# Patient Record
Sex: FEMALE | Race: WHITE | NOT HISPANIC OR LATINO | Employment: OTHER | URBAN - METROPOLITAN AREA
[De-identification: names, ages, dates, MRNs, and addresses within clinical notes are randomized per-mention and may not be internally consistent; named-entity substitution may affect disease eponyms.]

---

## 2017-01-12 ENCOUNTER — DOCUMENTATION ONLY (OUTPATIENT)
Dept: NEUROLOGY | Facility: CLINIC | Age: 57
End: 2017-01-12

## 2017-01-23 ENCOUNTER — TELEPHONE (OUTPATIENT)
Dept: NEUROLOGY | Facility: CLINIC | Age: 57
End: 2017-01-23

## 2017-01-23 NOTE — TELEPHONE ENCOUNTER
----- Message from Farrah Francis sent at 1/23/2017  3:07 PM CST -----  Contact: Patient  Patient called and said she needs a refill of teriflunomide (AUBAGIO) 14 mg Tab. She said her preferred pharmacy does not have the right medication and they are usually mailed to her via UPS. She did not know name of pharmacy.    Please call her about this at [phone]

## 2017-01-23 NOTE — TELEPHONE ENCOUNTER
I called this patient again there was no answer I left a message informing her that I had received both of her calls and was trying to call her back to assist her.      Shilo OTTO

## 2017-01-23 NOTE — TELEPHONE ENCOUNTER
----- Message from Mt Bright sent at 1/23/2017  3:42 PM CST -----  Contact: PT  Would like to speak with someone, regarding her medication teriflunomide (AUBAGIO) 14 mg Tab    Call: [phone]

## 2017-01-31 RX ORDER — TERIFLUNOMIDE 14 MG/1
14 TABLET, FILM COATED ORAL
Qty: 30 TABLET | Refills: 11 | Status: SHIPPED | OUTPATIENT
Start: 2017-01-31 | End: 2018-08-07

## 2017-04-12 ENCOUNTER — LAB VISIT (OUTPATIENT)
Dept: LAB | Facility: HOSPITAL | Age: 57
End: 2017-04-12
Attending: NEUROMUSCULOSKELETAL MEDICINE & OMM
Payer: COMMERCIAL

## 2017-04-12 ENCOUNTER — OFFICE VISIT (OUTPATIENT)
Dept: NEUROLOGY | Facility: CLINIC | Age: 57
End: 2017-04-12
Payer: COMMERCIAL

## 2017-04-12 VITALS
WEIGHT: 145 LBS | HEART RATE: 107 BPM | BODY MASS INDEX: 28.47 KG/M2 | DIASTOLIC BLOOD PRESSURE: 107 MMHG | HEIGHT: 60 IN | SYSTOLIC BLOOD PRESSURE: 138 MMHG

## 2017-04-12 DIAGNOSIS — G35 MS (MULTIPLE SCLEROSIS): Primary | ICD-10-CM

## 2017-04-12 DIAGNOSIS — R26.9 GAIT DISORDER: ICD-10-CM

## 2017-04-12 DIAGNOSIS — G35 MS (MULTIPLE SCLEROSIS): ICD-10-CM

## 2017-04-12 DIAGNOSIS — G35 MULTIPLE SCLEROSIS: ICD-10-CM

## 2017-04-12 LAB
ALBUMIN SERPL BCP-MCNC: 3.9 G/DL
ALP SERPL-CCNC: 106 U/L
ALT SERPL W/O P-5'-P-CCNC: 32 U/L
AST SERPL-CCNC: 40 U/L
BILIRUB DIRECT SERPL-MCNC: 0.1 MG/DL
BILIRUB SERPL-MCNC: 0.1 MG/DL
PROT SERPL-MCNC: 7.8 G/DL

## 2017-04-12 PROCEDURE — 99214 OFFICE O/P EST MOD 30 MIN: CPT | Mod: S$GLB,,, | Performed by: NEUROMUSCULOSKELETAL MEDICINE & OMM

## 2017-04-12 PROCEDURE — 99999 PR PBB SHADOW E&M-EST. PATIENT-LVL II: CPT | Mod: PBBFAC,,, | Performed by: NEUROMUSCULOSKELETAL MEDICINE & OMM

## 2017-04-12 PROCEDURE — 36415 COLL VENOUS BLD VENIPUNCTURE: CPT | Mod: PO

## 2017-04-12 PROCEDURE — 86704 HEP B CORE ANTIBODY TOTAL: CPT

## 2017-04-12 PROCEDURE — 3080F DIAST BP >= 90 MM HG: CPT | Mod: S$GLB,,, | Performed by: NEUROMUSCULOSKELETAL MEDICINE & OMM

## 2017-04-12 PROCEDURE — 3075F SYST BP GE 130 - 139MM HG: CPT | Mod: S$GLB,,, | Performed by: NEUROMUSCULOSKELETAL MEDICINE & OMM

## 2017-04-12 PROCEDURE — 80076 HEPATIC FUNCTION PANEL: CPT

## 2017-04-12 NOTE — PROGRESS NOTES
This note was dictated with Dragon Natural Speaking a word recognition program. There are occasionally word recognition errors which are missed on review.      Present Illness: Patient presents for follow-up for her MS.  She was seeing Dr. Zimmer but did not want to do the 3 MRI scans, so has come back to me.  She wants to change to a new medication, however I have encouraged her to stick with what she is on now-Aubagio since she is stable and not having any side effects.  The problem is she is not realistic and thinking that she can reverse her gait disorder.  I have emphasized the fact that I doubt if any other medications are going to reverse her present deficits.  I maintain goal at this point is to keep her from progressing.    Previous note 10-14-16: Patient presents for follow-up for her MS today with complaints of getting worse. She does not think the Aubagio is working as well as initially she thought. She feels her legs are getting weaker and she needs more support to walk. The last couple months she has not been able to walk without significant support of her walker. She spends most of the time in her scooter. She is interested in finding something new or and more potent to help her.     Previous note: 1-6-16: Patient presents for follow-up for her MS. She is actually doing much better on Aubagio. She is been taking the lower dose 7 mg for about 3 months and is ready to increase to the standard 14 mg dose. Her face pain has resolved. She can walk a few steps unassisted. She still stays active but uses her electric scooter most of the time outside the house. Her bladder has stabilized. MRI scans of brain and spinal cord have been stable with no significant change from the 2013 studies with a moderate to severe plaque load.            Neurologic exam:      Blepharospasm noted; normal speech with heavy Cajun accent  Cranial nerves: Normal visual acuity at bedside testing. Visual field to confrontation -  normal. Pupils are reactive. External ocular movements- full range of motion; no nystagmus or diplopia. Normal corneal reflexes and facial sensations. No facial asymmetry noted and facial movements including smiling were normal. Hearing was unimpaired. Palate movements are normal with normal gag response. Shoulder movements including shrug response was normal. Tongue movements are normal and with no evidence of atrophy ; involuntary movements - postural head tremor.  Muscle strength: upper- Weakness of the handgrip;  Lower-Right proximal leg-3 minus left 3 plus/5. Today she can still stand , but not walk unassisted with severe ataxia and weakness   Sensory examination:Pinprick and touch - variable decrease. Vibration- decreased < 10 seconds at the toes   Deep tendon reflexes: upper extremity 1- 2- +; lower extremity- KJ 2 + / AJ - 1+ Both plantars- flexor.   Cerebellar exam upper extremities - finger to nose - normal ; rapid alternating movements - gait- wide base unsteady - not able to walk unassisted., Tandem gait-poor. Romberg's -poor  Cervical exam: Cervical / trapezius muscle - no tenderness; Lumbar Exam: Low back tenderness-negative sciatic notch tenderness-negative straight leg raising test-negative    Diagnoses; Worsening gait ; multiple sclerosis with recent flareup; Multiple sclerosis with cognitive issues :Left third division trigeminal neuralgia;Left TMJ syndrome; fatigue; gait disorder ; negative ROB antibody repeat  Recommendations: Continue Aubagio for now; long discussion with the patient in terms of options.  Patient is interested in changing Ocluzimab, however we discussed the fact that she is stable on Aubagio and not having any side effects.  She also does not want to have any further MRIs done at the present time which I think would be necessary if we change medications.  She has agreed to continue with the Aubagio now.  Blood work including hep B antibody and liver function tests.  Follow-up 3  months

## 2017-04-13 LAB — HBV CORE AB SERPL QL IA: NEGATIVE

## 2017-06-07 ENCOUNTER — TELEPHONE (OUTPATIENT)
Dept: NEUROLOGY | Facility: CLINIC | Age: 57
End: 2017-06-07

## 2017-06-07 NOTE — TELEPHONE ENCOUNTER
----- Message from Ravi Chen sent at 6/5/2017  3:39 PM CDT -----  Contact: Self [phone]  Patient is requesting a return call regarding coming in to sign paperwork for new medication, pls call

## 2017-06-07 NOTE — TELEPHONE ENCOUNTER
I spoke to this patient and she states that she has fallen 4 times on yesterday and she will be in on Friday to fill out the forms to start Ocrevus

## 2017-06-14 ENCOUNTER — TELEPHONE (OUTPATIENT)
Dept: ALLERGY | Facility: CLINIC | Age: 57
End: 2017-06-14

## 2017-06-16 ENCOUNTER — TELEPHONE (OUTPATIENT)
Dept: NEUROLOGY | Facility: CLINIC | Age: 57
End: 2017-06-16

## 2017-06-16 NOTE — TELEPHONE ENCOUNTER
----- Message from Ravi Chen sent at 6/16/2017  1:47 PM CDT -----  Contact: Self [phone]  Patient is returning a missed call from Shilo about signing paper she can't make it today she'll go next Friday.      That is fine she can come when ever she has the opportunity

## 2017-08-04 ENCOUNTER — OFFICE VISIT (OUTPATIENT)
Dept: NEUROLOGY | Facility: CLINIC | Age: 57
End: 2017-08-04
Payer: COMMERCIAL

## 2017-08-04 VITALS — HEART RATE: 97 BPM | SYSTOLIC BLOOD PRESSURE: 130 MMHG | HEIGHT: 60 IN | DIASTOLIC BLOOD PRESSURE: 93 MMHG

## 2017-08-04 DIAGNOSIS — R26.9 GAIT DISORDER: ICD-10-CM

## 2017-08-04 DIAGNOSIS — G35 MS (MULTIPLE SCLEROSIS): Primary | ICD-10-CM

## 2017-08-04 DIAGNOSIS — G50.0 TRIGEMINAL NEURALGIA: ICD-10-CM

## 2017-08-04 PROCEDURE — 99215 OFFICE O/P EST HI 40 MIN: CPT | Mod: S$GLB,,, | Performed by: NEUROMUSCULOSKELETAL MEDICINE & OMM

## 2017-08-04 PROCEDURE — 3008F BODY MASS INDEX DOCD: CPT | Mod: S$GLB,,, | Performed by: NEUROMUSCULOSKELETAL MEDICINE & OMM

## 2017-08-04 PROCEDURE — 99999 PR PBB SHADOW E&M-EST. PATIENT-LVL III: CPT | Mod: PBBFAC,,, | Performed by: NEUROMUSCULOSKELETAL MEDICINE & OMM

## 2017-08-04 RX ORDER — OXCARBAZEPINE 300 MG/1
300 TABLET, FILM COATED ORAL 2 TIMES DAILY
Qty: 60 TABLET | Refills: 5 | Status: SHIPPED | OUTPATIENT
Start: 2017-08-04 | End: 2018-07-23 | Stop reason: SDUPTHER

## 2017-08-04 RX ORDER — ESCITALOPRAM OXALATE 20 MG/1
20 TABLET ORAL NIGHTLY
Qty: 30 TABLET | Refills: 3 | Status: SHIPPED | OUTPATIENT
Start: 2017-08-04 | End: 2017-12-21 | Stop reason: SDUPTHER

## 2017-08-04 RX ORDER — LORAZEPAM 2 MG/1
2 TABLET ORAL 3 TIMES DAILY PRN
Qty: 90 TABLET | Refills: 0 | Status: SHIPPED | OUTPATIENT
Start: 2017-08-04

## 2017-08-04 NOTE — PROGRESS NOTES
Office Visit     4/12/2017  [place] - Neurology      Josh Moon MD   Neurology   MS (multiple sclerosis) +2 more   Dx    Additional Documentation     Vitals:    BP  138/107 (BP Location: Left arm, Patient Position: Sitting, BP Method: Automatic)    Pulse 107    Ht 5' (1.524 m)    Wt 65.8 kg (145 lb)    BMI 28.32 kg/m²    BSA 1.67 m²    Flowsheets:    Custom Formula Data,    Anthropometrics       Encounter Info:    Billing Info,    Detailed Report,    Patient Education,    Care Plan,    History,    Allergies,    Patient-Entered Questionnaires       Instructions         Return in about 3 months (around 7/12/2017).   Progress Notes        This note was dictated with Dragon Natural Speaking a word recognition program. There are occasionally word recognition errors which are missed on review.      Present Illness:  patient presents for follow-up for her MS.  She has been getting weaker with more difficulty walking.  Her  says that she can go for a week without being able to get out of the bed or walk.  Today she is doing better and was able to get out of the bed and go to the bathroom with her walker.  The weakness seems to be off and on.  Her other issue was left facial shocking pain.  I was called by the dentist who wanted to prescribe something for the pain.  We started her on Trileptal 150 mg twice a day.  She says this is not helping the pain nor did the tooth that she had pulled stop the pain.  She is presently on the Trileptal 150 twice a day and we will increase that to 300 twice a day over a several day.  She is still taking Aubagio but apparently is is not helping.  We do not know if she has new lesions since she did not have the MRIs done.  Previous note: 4-12-17 Patient presents for follow-up for her MS.  She was seeing Dr. Zimmer but did not want to do the 3 MRI scans, so has come back to me.  She wants to change to a new medication, however I have encouraged her to stick with what she is  on now-Aubagio since she is stable and not having any side effects.  The problem is she is not realistic and thinking that she can reverse her gait disorder.  I have emphasized the fact that I doubt if any other medications are going to reverse her present deficits.  I maintain goal at this point is to keep her from progressing.     Previous note 10-14-16: Patient presents for follow-up for her MS today with complaints of getting worse. She does not think the Aubagio is working as well as initially she thought. She feels her legs are getting weaker and she needs more support to walk. The last couple months she has not been able to walk without significant support of her walker. She spends most of the time in her scooter. She is interested in finding something new or and more potent to help her.     Previous note: 1-6-16: Patient presents for follow-up for her MS. She is actually doing much better on Aubagio. She is been taking the lower dose 7 mg for about 3 months and is ready to increase to the standard 14 mg dose. Her face pain has resolved. She can walk a few steps unassisted. She still stays active but uses her electric scooter most of the time outside the house. Her bladder has stabilized. MRI scans of brain and spinal cord have been stable with no significant change from the 2013 studies with a moderate to severe plaque load.             Neurologic exam:      Blepharospasm noted; normal speech with heavy Cajun accent  Cranial nerves: Normal visual acuity at bedside testing. Visual field to confrontation - normal. Pupils are reactive. External ocular movements- full range of motion; no nystagmus or diplopia. Normal corneal reflexes and facial sensations. No facial asymmetry noted and facial movements including smiling were normal. Hearing was unimpaired. Palate movements are normal with normal gag response. Shoulder movements including shrug response was normal. Tongue movements are normal and with no evidence  of atrophy ; involuntary movements - postural head tremor.  Muscle strength: upper- Weakness of the handgrip;  Lower-Right proximal leg-3 minus left 3 plus/5. Today she can still stand , but not walk unassisted with severe ataxia and weakness   Sensory examination:Pinprick and touch - variable decrease. Vibration- decreased < 10 seconds at the toes   Deep tendon reflexes: upper extremity 1- 2- +; lower extremity- KJ 2 + / AJ - 1+ Both plantars- flexor.   Cerebellar exam upper extremities - finger to nose - normal ; rapid alternating movements - gait- wide base unsteady - not able to walk unassisted., Tandem gait-poor. Romberg's -poor  Cervical exam: Cervical / trapezius muscle - no tenderness; Lumbar Exam: Low back tenderness-negative sciatic notch tenderness-negative straight leg raising test-negative    Diagnoses; Worsening gait ; multiple sclerosis with recent flareup; Multiple sclerosis with cognitive issues :Left third division trigeminal neuralgia;Left TMJ syndrome; fatigue; gait disorder ; negative ROB antibody repeat  Recommendations:  MRI brain and cervical spine with and without contrast.  Patient has not had repeat MRIs since she has been on the Aubagio.  We'll start Ocrelizumab since she has been going downhill over the last several months to the point of inability to walk.  Follow-up 2 months

## 2017-08-17 ENCOUNTER — TELEPHONE (OUTPATIENT)
Dept: PHYSICAL MEDICINE AND REHAB | Facility: CLINIC | Age: 57
End: 2017-08-17

## 2017-08-17 NOTE — TELEPHONE ENCOUNTER
----- Message from Zane Child sent at 8/17/2017  1:25 PM CDT -----  Contact: Jenn jean/ [facility] @ [phone]  Jenn is calling to speak with someone regarding the pt. Pls call.      I have called and reached the voicemail therefore I left a message with my direct extension and asked that she call ne tomorrow because I am not in the office today

## 2017-08-21 ENCOUNTER — TELEPHONE (OUTPATIENT)
Dept: NEUROLOGY | Facility: CLINIC | Age: 57
End: 2017-08-21

## 2017-08-21 NOTE — TELEPHONE ENCOUNTER
Left detailed message on Jenn's voicemail explaining Dr. Vinson as well as Shilo is out of the office until Monday August 28, 2017/Asked that she call back on Monday.

## 2017-08-21 NOTE — TELEPHONE ENCOUNTER
----- Message from Ravi Chen sent at 8/21/2017 11:00 AM CDT -----  Contact: Jenn with [facility] [phone]   Caller is requesting a return phone call from the nurse regarding a fax sent regarding the recent MRI, neuro exam, history and physical, pls call or fax [phone]

## 2017-09-27 ENCOUNTER — TELEPHONE (OUTPATIENT)
Dept: NEUROLOGY | Facility: CLINIC | Age: 57
End: 2017-09-27

## 2017-09-27 NOTE — TELEPHONE ENCOUNTER
----- Message from Yen Escobar sent at 9/26/2017  2:17 PM CDT -----  Contact: Patient [phone]  Patient is calling to find out if her insurance will cover her injections for her MS. Please call         I called this patient three times yesterday and there was no answer then I called again and she answered there was a kid yelling and crying in the background then she kept putting me on hold so I told her I will let her deal with that child and call her back and there was no answer and I have tried twice again this morning and there was no answer

## 2017-12-26 RX ORDER — ESCITALOPRAM OXALATE 20 MG/1
20 TABLET ORAL NIGHTLY
Qty: 30 TABLET | Refills: 2 | Status: SHIPPED | OUTPATIENT
Start: 2017-12-26 | End: 2018-08-14 | Stop reason: SDUPTHER

## 2018-02-21 ENCOUNTER — TELEPHONE (OUTPATIENT)
Dept: NEUROLOGY | Facility: CLINIC | Age: 58
End: 2018-02-21

## 2018-02-21 NOTE — TELEPHONE ENCOUNTER
Pt aware that 2/23/18 appt would need to be cancelled due to provider being out. She has accepted a new appt on 3/2/18 @ 2:20pm. New appt letter placed in the mail.

## 2018-03-12 ENCOUNTER — OFFICE VISIT (OUTPATIENT)
Dept: NEUROLOGY | Facility: CLINIC | Age: 58
End: 2018-03-12
Payer: COMMERCIAL

## 2018-03-12 VITALS
HEIGHT: 60 IN | BODY MASS INDEX: 28.47 KG/M2 | DIASTOLIC BLOOD PRESSURE: 104 MMHG | WEIGHT: 145 LBS | SYSTOLIC BLOOD PRESSURE: 147 MMHG | HEART RATE: 109 BPM

## 2018-03-12 DIAGNOSIS — R26.9 GAIT DISORDER: Primary | ICD-10-CM

## 2018-03-12 DIAGNOSIS — G35 MULTIPLE SCLEROSIS EXACERBATION: ICD-10-CM

## 2018-03-12 DIAGNOSIS — G82.20 PARAPLEGIA: ICD-10-CM

## 2018-03-12 PROCEDURE — 99214 OFFICE O/P EST MOD 30 MIN: CPT | Mod: S$GLB,,, | Performed by: NEUROMUSCULOSKELETAL MEDICINE & OMM

## 2018-03-12 PROCEDURE — 3077F SYST BP >= 140 MM HG: CPT | Mod: CPTII,S$GLB,, | Performed by: NEUROMUSCULOSKELETAL MEDICINE & OMM

## 2018-03-12 PROCEDURE — 3080F DIAST BP >= 90 MM HG: CPT | Mod: CPTII,S$GLB,, | Performed by: NEUROMUSCULOSKELETAL MEDICINE & OMM

## 2018-03-12 PROCEDURE — 99999 PR PBB SHADOW E&M-EST. PATIENT-LVL III: CPT | Mod: PBBFAC,,, | Performed by: NEUROMUSCULOSKELETAL MEDICINE & OMM

## 2018-03-12 RX ORDER — ESCITALOPRAM OXALATE 10 MG/1
10 TABLET ORAL DAILY
Refills: 11 | Status: ON HOLD | COMMUNITY
Start: 2018-03-07 | End: 2018-09-05 | Stop reason: HOSPADM

## 2018-03-13 ENCOUNTER — TELEPHONE (OUTPATIENT)
Dept: NEUROLOGY | Facility: CLINIC | Age: 58
End: 2018-03-13

## 2018-03-13 PROBLEM — G82.20 PARAPLEGIA: Status: ACTIVE | Noted: 2018-03-13

## 2018-03-13 NOTE — TELEPHONE ENCOUNTER
----- Message from Ravi Chen sent at 3/13/2018  2:10 PM CDT -----  Contact: Victorina jean [facility] @ [phone]  Caller is calling to get the diagnosis on the fax received, pls call or re fax with the diagnosis @ [phone] pt is at the location now

## 2018-03-13 NOTE — PROGRESS NOTES
This note was dictated with Dragon Natural Speaking a word recognition program. There are occasionally word recognition errors which are missed on review.      Present Illness: Patient presents for follow-up with her  today.  She has had a severe exacerbation over the last several days to the point that she cannot move her legs at all.  She previously could get up with assistance.  She feels like she is having a severe exacerbation.  She apparently has had problems with insurance and getting on the Ocrevus which apparently was approved.  Unfortunately the  did not follow-up with the insurance people and she has been without medication for 6 months.  We will start over and try to get this set up.  Previous note: 8-04-17  patient presents for follow-up for her MS.  She has been getting weaker with more difficulty walking.  Her  says that she can go for a week without being able to get out of the bed or walk.  Today she is doing better and was able to get out of the bed and go to the bathroom with her walker.  The weakness seems to be off and on.  Her other issue was left facial shocking pain.  I was called by the dentist who wanted to prescribe something for the pain.  We started her on Trileptal 150 mg twice a day.  She says this is not helping the pain nor did the tooth that she had pulled stop the pain.  She is presently on the Trileptal 150 twice a day and we will increase that to 300 twice a day over a several day.  She is still taking Aubagio but apparently is is not helping.  We do not know if she has new lesions since she did not have the MRIs done.  Previous note: 4-12-17 Patient presents for follow-up for her MS.  She was seeing Dr. Zimmer but did not want to do the 3 MRI scans, so has come back to me.  She wants to change to a new medication, however I have encouraged her to stick with what she is on now-Aubagio since she is stable and not having any side effects.  The problem is she is  not realistic and thinking that she can reverse her gait disorder.  I have emphasized the fact that I doubt if any other medications are going to reverse her present deficits.  I maintain goal at this point is to keep her from progressing.     Previous note 10-14-16: Patient presents for follow-up for her MS today with complaints of getting worse. She does not think the Aubagio is working as well as initially she thought. She feels her legs are getting weaker and she needs more support to walk. The last couple months she has not been able to walk without significant support of her walker. She spends most of the time in her scooter. She is interested in finding something new or and more potent to help her.     Previous note: 1-6-16: Patient presents for follow-up for her MS. She is actually doing much better on Aubagio. She is been taking the lower dose 7 mg for about 3 months and is ready to increase to the standard 14 mg dose. Her face pain has resolved. She can walk a few steps unassisted. She still stays active but uses her electric scooter most of the time outside the house. Her bladder has stabilized. MRI scans of brain and spinal cord have been stable with no significant change from the 2013 studies with a moderate to severe plaque load.             Neurologic exam:      Blepharospasm noted; normal speech with heavy Cajun accent  Cranial nerves: Normal visual acuity at bedside testing. Visual field to confrontation - normal. Pupils are reactive. External ocular movements- full range of motion; no nystagmus or diplopia. Normal corneal reflexes and facial sensations. No facial asymmetry noted and facial movements including smiling were normal. Hearing was unimpaired. Palate movements are normal with normal gag response. Shoulder movements including shrug response was normal. Tongue movements are normal and with no evidence of atrophy ; involuntary movements - postural head tremor.  Muscle strength: upper-  Weakness of the handgrip;  Patient has severe lower extremity weakness and is unable to stand today.  Sensory examination:Pinprick and touch - variable decrease. Vibration- decreased < 10 seconds at the toes   Deep tendon reflexes: upper extremity 1- 2- +; lower extremity- KJ 2 + / AJ - 1+ Both plantars- flexor.   Cerebellar exam upper extremities - finger to nose - normal ; rapid alternating movements - gait- wide base unsteady - not able to walk unassisted., Tandem gait-poor. Romberg's -poor  Cervical exam: Cervical / trapezius muscle - no tenderness; Lumbar Exam: Low back tenderness-negative sciatic notch tenderness-negative straight leg raising test-negative    Diagnoses; acute MS exacerbation; Worsening gait ; multiple sclerosis with recent flareup; Multiple sclerosis with cognitive issues :Left third division trigeminal neuralgia;Left TMJ syndrome; fatigue; gait disorder ; negative ROB antibody repeat  Recommendations:   patient will be given 3 days of IV Solu-Medrol for an acute exacerbation.  The  will check on the insurance situation in terms of Ocrevus to get her started on this medicine.

## 2018-04-13 ENCOUNTER — TELEPHONE (OUTPATIENT)
Dept: NEUROLOGY | Facility: CLINIC | Age: 58
End: 2018-04-13

## 2018-04-13 ENCOUNTER — PATIENT MESSAGE (OUTPATIENT)
Dept: NEUROLOGY | Facility: CLINIC | Age: 58
End: 2018-04-13

## 2018-04-13 NOTE — TELEPHONE ENCOUNTER
----- Message from Marleny Toscano sent at 4/12/2018  1:30 PM CDT -----  Contact: Mary from [employer]  Mary is calling for information on iv drug for pt and can be reached at [phone].    Thank you

## 2018-04-19 ENCOUNTER — PATIENT MESSAGE (OUTPATIENT)
Dept: NEUROLOGY | Facility: CLINIC | Age: 58
End: 2018-04-19

## 2018-04-27 ENCOUNTER — TELEPHONE (OUTPATIENT)
Dept: NEUROLOGY | Facility: CLINIC | Age: 58
End: 2018-04-27

## 2018-05-22 ENCOUNTER — PATIENT MESSAGE (OUTPATIENT)
Dept: NEUROLOGY | Facility: CLINIC | Age: 58
End: 2018-05-22

## 2018-05-23 ENCOUNTER — TELEPHONE (OUTPATIENT)
Dept: NEUROLOGY | Facility: CLINIC | Age: 58
End: 2018-05-23

## 2018-05-23 NOTE — TELEPHONE ENCOUNTER
Dr. Moon can you create an order for Ocrevus infusion and sign so I can send to pharmacy and get this started for patient.

## 2018-06-20 ENCOUNTER — PATIENT MESSAGE (OUTPATIENT)
Dept: NEUROLOGY | Facility: CLINIC | Age: 58
End: 2018-06-20

## 2018-06-21 ENCOUNTER — PATIENT MESSAGE (OUTPATIENT)
Dept: NEUROLOGY | Facility: CLINIC | Age: 58
End: 2018-06-21

## 2018-06-22 ENCOUNTER — TELEPHONE (OUTPATIENT)
Dept: NEUROLOGY | Facility: CLINIC | Age: 58
End: 2018-06-22

## 2018-07-05 ENCOUNTER — PATIENT MESSAGE (OUTPATIENT)
Dept: NEUROLOGY | Facility: CLINIC | Age: 58
End: 2018-07-05

## 2018-07-06 ENCOUNTER — TELEPHONE (OUTPATIENT)
Dept: NEUROLOGY | Facility: CLINIC | Age: 58
End: 2018-07-06

## 2018-07-06 NOTE — TELEPHONE ENCOUNTER
----- Message from Janel العلي sent at 7/6/2018  1:03 PM CDT -----  Rx Refill/Request     Is this a Refill or New Rx:  refill  Rx Name and Strength:  dalfampridine (AMPYRA) 10 mg Tb12  Preferred Pharmacy with phone number:     [facility] [place] [address]  [address] [address]  Phone: [phone] Fax: [phone]      Communication Preference:[facility] @ [phone] option 3  Additional Information:

## 2018-07-06 NOTE — TELEPHONE ENCOUNTER
Spoke with patients  and he states they have been awaiting approval for Ocrevus. Advised patient that I would speak with Sarai and initiate any PA or paperwork that needs to be done.

## 2018-07-06 NOTE — TELEPHONE ENCOUNTER
Can you put in lab orders for the Ocrevus. We need you to order TB, HIV, Basebline CBC, Hep. A,B,C. And whatever other labs that are not up to date for MS. Can you put in this order ASAP. Going to schedule an appointment with Dr. Zimmer so we can get her started on the Ocrevus infusion. Spoke with patients  and will begin working on everything once the labs are ordered.

## 2018-07-11 RX ORDER — ROSUVASTATIN CALCIUM 10 MG/1
10 TABLET, COATED ORAL NIGHTLY
Refills: 11 | COMMUNITY
Start: 2018-07-03

## 2018-07-13 ENCOUNTER — TELEPHONE (OUTPATIENT)
Dept: NEUROLOGY | Facility: CLINIC | Age: 58
End: 2018-07-13

## 2018-07-13 DIAGNOSIS — G35 MS (MULTIPLE SCLEROSIS): Primary | ICD-10-CM

## 2018-07-18 ENCOUNTER — LAB VISIT (OUTPATIENT)
Dept: LAB | Facility: HOSPITAL | Age: 58
End: 2018-07-18
Attending: PSYCHIATRY & NEUROLOGY
Payer: COMMERCIAL

## 2018-07-18 ENCOUNTER — TELEPHONE (OUTPATIENT)
Dept: NEUROLOGY | Facility: CLINIC | Age: 58
End: 2018-07-18

## 2018-07-18 ENCOUNTER — OFFICE VISIT (OUTPATIENT)
Dept: NEUROLOGY | Facility: CLINIC | Age: 58
End: 2018-07-18
Payer: COMMERCIAL

## 2018-07-18 VITALS
BODY MASS INDEX: 30.81 KG/M2 | HEIGHT: 60 IN | SYSTOLIC BLOOD PRESSURE: 144 MMHG | DIASTOLIC BLOOD PRESSURE: 104 MMHG | WEIGHT: 156.94 LBS

## 2018-07-18 DIAGNOSIS — G35 MS (MULTIPLE SCLEROSIS): ICD-10-CM

## 2018-07-18 DIAGNOSIS — D84.9 IMMUNOSUPPRESSION: ICD-10-CM

## 2018-07-18 DIAGNOSIS — M62.838 MUSCLE SPASM: ICD-10-CM

## 2018-07-18 DIAGNOSIS — Z74.09 IMMOBILITY: ICD-10-CM

## 2018-07-18 DIAGNOSIS — N31.9 NEUROGENIC BLADDER: Primary | ICD-10-CM

## 2018-07-18 DIAGNOSIS — F34.1 DYSTHYMIA: ICD-10-CM

## 2018-07-18 DIAGNOSIS — Z71.89 COUNSELING REGARDING GOALS OF CARE: ICD-10-CM

## 2018-07-18 DIAGNOSIS — Z29.89 PROPHYLACTIC IMMUNOTHERAPY: ICD-10-CM

## 2018-07-18 DIAGNOSIS — R26.9 GAIT DISTURBANCE: ICD-10-CM

## 2018-07-18 DIAGNOSIS — Z79.899 ENCOUNTER FOR LONG-TERM (CURRENT) USE OF HIGH-RISK MEDICATION: ICD-10-CM

## 2018-07-18 PROCEDURE — 99354 PR PROLONGED SVC, OUPT, 1ST HR: CPT | Mod: S$GLB,,, | Performed by: PSYCHIATRY & NEUROLOGY

## 2018-07-18 PROCEDURE — 3008F BODY MASS INDEX DOCD: CPT | Mod: CPTII,S$GLB,, | Performed by: PSYCHIATRY & NEUROLOGY

## 2018-07-18 PROCEDURE — 99215 OFFICE O/P EST HI 40 MIN: CPT | Mod: S$GLB,,, | Performed by: PSYCHIATRY & NEUROLOGY

## 2018-07-18 PROCEDURE — 99999 PR PBB SHADOW E&M-EST. PATIENT-LVL III: CPT | Mod: PBBFAC,,, | Performed by: PSYCHIATRY & NEUROLOGY

## 2018-07-18 PROCEDURE — 86480 TB TEST CELL IMMUN MEASURE: CPT

## 2018-07-18 RX ORDER — ARIPIPRAZOLE 5 MG/1
5 TABLET ORAL DAILY
Qty: 30 TABLET | Refills: 11 | Status: SHIPPED | OUTPATIENT
Start: 2018-07-18 | End: 2020-03-25

## 2018-07-18 NOTE — PROGRESS NOTES
Subjective:       Patient ID: Deb Figueroa is a 57 y.o. female who presents today for a routine clinic visit for MS.  Pt last seen Nov 2016 by me; Pt is accompanied by her     MS HPI:  · DMT: none; stopped Aubagio one year ago;   · Taking vitamin D3 as recommended? no  · Pt states that in the past 1.5 years since I saw her.  She states that she has lost the ability to walk with a walker;  Now her transfers are more difficult, and often falls;  She is unable to stand and pivot;    · His  states there are lots of caregiving needs which are provided by her  and other extended family members  · On Trileptal for [place];      SOCIAL HISTORY  Social History   Substance Use Topics    Smoking status: Never Smoker    Smokeless tobacco: Never Used    Alcohol use No     Living arrangements - the patient lives with their family.  Employment : no    MS ROS:  · Bladder Dysfunction: Yes - some incontinence; wears diaper; interested in meds that might help; gets UTIs only occasionally; never admitted with UTI she states;          Objective:      In scooter; unable to walk or stand  Neurologic Exam    right EVAN, mild dysarthria    RHF 1/5, RKF 3/5, RDF 4/5  LHF 2/5, LKF 3/5, LDF 4/5         Imaging:     Results for orders placed during the hospital encounter of 10/16/15   MRI Brain W WO Contrast    Impression Findings in the cerebral white matter which are typical for multiple sclerosis with moderate to severe degree of underlying plaque burden.  Compared to the prior examination of 8/19/2013, the overall number and signal intensity of the white matter lesions is stable.  No new enhancing plaques are seen to suggest active disease.    Left maxillary sinus disease.  ______________________________________     Electronically signed by resident: Josh Canales  Date:     10/16/15  Time:    16:12          As the supervising and teaching physician, I personally reviewed the images and resident's interpretation and I  agree with the findings.          Electronically signed by: Aisha Stevens MD  Date:     10/16/15  Time:    16:16      Results for orders placed during the hospital encounter of 10/16/15   MRI Cervical Spine W WO Cont    Impression      Focal signal abnormalities again seen within the craniocervical junction and cervical spinal cord likely representing plaques of prior demyelination given patient's history of multiple sclerosis.  No evidence for enhancement to suggest active demyelination.  Overall findings are stable when compared to prior examination from 8/19/2013.  ______________________________________     Electronically signed by resident: Josh Canales  Date:     10/16/15  Time:    16:11          As the supervising and teaching physician, I personally reviewed the images and resident's interpretation and I agree with the findings.          Electronically signed by: Aisha Stevens MD  Date:     10/16/15  Time:    16:15                  Labs:     Lab Results   Component Value Date    HZHBXVQP23KJ 7 (L) 07/18/2018    MUSSAVNU23MK 15 (L) 11/28/2016     Lab Results   Component Value Date    JCVINDEX 0.10 11/28/2016    JCVANTIBODY Negative 11/28/2016     Lab Results   Component Value Date    TQ3LWRJE 81.7 11/28/2016    ABSOLUTECD3 2375 (H) 11/28/2016    TY9VJXJD 23.7 11/28/2016    ABSOLUTECD8 689 11/28/2016    LA2MUABL 58.7 (H) 11/28/2016    ABSOLUTECD4 1707 (H) 11/28/2016    LABCD48 2.48 11/28/2016     Lab Results   Component Value Date    WBC 7.96 07/18/2018    RBC 5.71 (H) 07/18/2018    HGB 15.3 07/18/2018    HCT 46.7 07/18/2018    MCV 82 07/18/2018    MCH 26.8 (L) 07/18/2018    MCHC 32.8 07/18/2018    RDW 13.2 07/18/2018     07/18/2018    MPV 10.9 07/18/2018    GRAN 4.1 07/18/2018    GRAN 52.0 07/18/2018    LYMPH 3.1 07/18/2018    LYMPH 39.2 07/18/2018    MONO 0.5 07/18/2018    MONO 6.4 07/18/2018    EOS 0.1 07/18/2018    BASO 0.08 07/18/2018    EOSINOPHIL 1.1 07/18/2018    BASOPHIL 1.0 07/18/2018      Sodium   Date Value Ref Range Status   07/18/2018 140 136 - 145 mmol/L Final     Potassium   Date Value Ref Range Status   07/18/2018 3.6 3.5 - 5.1 mmol/L Final     Chloride   Date Value Ref Range Status   07/18/2018 103 95 - 110 mmol/L Final     CO2   Date Value Ref Range Status   07/18/2018 26 23 - 29 mmol/L Final     Glucose   Date Value Ref Range Status   07/18/2018 109 70 - 110 mg/dL Final     BUN, Bld   Date Value Ref Range Status   07/18/2018 15 6 - 20 mg/dL Final     Creatinine   Date Value Ref Range Status   07/18/2018 0.8 0.5 - 1.4 mg/dL Final     Calcium   Date Value Ref Range Status   07/18/2018 10.2 8.7 - 10.5 mg/dL Final     Total Protein   Date Value Ref Range Status   07/18/2018 8.8 (H) 6.0 - 8.4 g/dL Final     Albumin   Date Value Ref Range Status   07/18/2018 4.6 3.5 - 5.2 g/dL Final     Total Bilirubin   Date Value Ref Range Status   07/18/2018 0.3 0.1 - 1.0 mg/dL Final     Comment:     For infants and newborns, interpretation of results should be based  on gestational age, weight and in agreement with clinical  observations.  Premature Infant recommended reference ranges:  Up to 24 hours.............<8.0 mg/dL  Up to 48 hours............<12.0 mg/dL  3-5 days..................<15.0 mg/dL  6-29 days.................<15.0 mg/dL       Alkaline Phosphatase   Date Value Ref Range Status   07/18/2018 130 55 - 135 U/L Final     AST   Date Value Ref Range Status   07/18/2018 32 10 - 40 U/L Final     ALT   Date Value Ref Range Status   07/18/2018 22 10 - 44 U/L Final     Anion Gap   Date Value Ref Range Status   07/18/2018 11 8 - 16 mmol/L Final     eGFR if    Date Value Ref Range Status   07/18/2018 >60.0 >60 mL/min/1.73 m^2 Final     eGFR if non    Date Value Ref Range Status   07/18/2018 >60.0 >60 mL/min/1.73 m^2 Final     Comment:     Calculation used to obtain the estimated glomerular filtration  rate (eGFR) is the CKD-EPI equation.                     Diagnosis/Assessment/Plan:    1. Multiple Sclerosis  · Assessment: Pt continues to decline in step-wise fashion; suggest initiation of Ocrevus  · Imaging: New MRis of brain, C and T spine ordered today  · Disease Modifying Therapies: will initiate Ocrevus; The rationale, side effects, risks and expectations of this medication was discussed. Refer to ID for vaccinations in consideration of chronically immunosuppressed state; Refer to ID for vaccinations in consideration of chronically immunosuppressed state;       2. MS Symptom Assessment / Management  · Mood Disorder: will add Abilify to Lexapro as adjunctive treatment of depression; The rationale, side effects, risks and expectations of this medication was discussed.     · Adaptive needs: will order Gabriel lift ; also discussed idea of hiring help in AM and PM at home--1 hour each time as caretaking is becoming a problem;   · Bladder: will start mirabegron; side effects discussed    F/u Carlota Duran PA-C in 10 weeks    Over 50% of this 90 minute visit was spent in direct face to face counseling of the patient about MS, DMT considerations, and MS symptom management.         Neurogenic bladder  -     mirabegron (MYRBETRIQ) 25 mg Tb24 ER tablet; Take 1 tablet (25 mg total) by mouth once daily.  Dispense: 30 tablet; Refill: 11    MS (multiple sclerosis)  -     PATIENT (GABRIEL) LIFT FOR HOME USE  -     ARIPiprazole (ABILIFY) 5 MG Tab; Take 1 tablet (5 mg total) by mouth once daily.  Dispense: 30 tablet; Refill: 11  -     MRI Brain Demyelinating W W/O Contrast; Future; Expected date: 07/18/2018  -     MRI Cervical Spine Demyelinating W W/O Contrast; Future; Expected date: 07/18/2018  -     MRI Thoracic Spine Demyelinating W W/O Contrast; Future; Expected date: 07/18/2018  -     Hepatitis B core antibody, total; Future; Expected date: 07/18/2018  -     Hepatitis B surface antibody; Future; Expected date: 07/18/2018  -     Hepatitis B surface antigen;  Future; Expected date: 07/18/2018  -     Hepatitis A antibody, IgG; Future; Expected date: 07/18/2018  -     Hepatitis C antibody; Future; Expected date: 07/18/2018  -     QUANTIFERON GOLD TB; Future; Expected date: 07/18/2018  -     Comprehensive metabolic panel; Future  -     CBC auto differential; Future; Expected date: 07/18/2018  -     Vitamin D; Future  -     STRATIFY JCV ANTIBODY (with Index); Future; Expected date: 07/18/2018  -     Vitamin B12; Future; Expected date: 07/18/2018  -     HIV-1 and HIV-2 antibodies; Future; Expected date: 07/18/2018  -     RPR; Future; Expected date: 07/18/2018  -     Varicella zoster antibody, IgG; Future; Expected date: 07/18/2018  -     STRONGYLOIDES IGG ANTIBODIES; Future; Expected date: 07/18/2018  -     Ambulatory Referral to Infectious Disease    Immobility  -     PATIENT (REY) LIFT FOR HOME USE    Dysthymia  -     ARIPiprazole (ABILIFY) 5 MG Tab; Take 1 tablet (5 mg total) by mouth once daily.  Dispense: 30 tablet; Refill: 11    Immunosuppression  -     Hepatitis B core antibody, total; Future; Expected date: 07/18/2018  -     Hepatitis B surface antibody; Future; Expected date: 07/18/2018  -     Hepatitis B surface antigen; Future; Expected date: 07/18/2018  -     Hepatitis A antibody, IgG; Future; Expected date: 07/18/2018  -     Hepatitis C antibody; Future; Expected date: 07/18/2018  -     QUANTIFERON GOLD TB; Future; Expected date: 07/18/2018  -     Comprehensive metabolic panel; Future  -     CBC auto differential; Future; Expected date: 07/18/2018  -     Vitamin D; Future  -     STRATIFY JCV ANTIBODY (with Index); Future; Expected date: 07/18/2018  -     Vitamin B12; Future; Expected date: 07/18/2018  -     HIV-1 and HIV-2 antibodies; Future; Expected date: 07/18/2018  -     RPR; Future; Expected date: 07/18/2018  -     Varicella zoster antibody, IgG; Future; Expected date: 07/18/2018  -     STRONGYLOIDES IGG ANTIBODIES; Future; Expected date: 07/18/2018  -      Ambulatory Referral to Infectious Disease

## 2018-07-18 NOTE — TELEPHONE ENCOUNTER
Left VM informing patient of MRI and ID appointment.  Also, need to schedule 10wk follow up appointment with Carlota Duran

## 2018-07-20 LAB
MITOGEN IGNF BCKGRD COR BLD-ACNC: 0.06 IU/ML
MITOGEN NIL: >10 IU/ML
TB GOLD PLUS: NEGATIVE
TB1 - NIL: 0.01 IU/ML
TB2 - NIL: 0.01 IU/ML

## 2018-07-23 ENCOUNTER — TELEPHONE (OUTPATIENT)
Dept: PSYCHIATRY | Facility: CLINIC | Age: 58
End: 2018-07-23

## 2018-07-23 RX ORDER — OXCARBAZEPINE 300 MG/1
300 TABLET, FILM COATED ORAL 2 TIMES DAILY
Qty: 60 TABLET | Refills: 5 | Status: SHIPPED | OUTPATIENT
Start: 2018-07-23 | End: 2020-02-04

## 2018-07-23 NOTE — TELEPHONE ENCOUNTER
[facility] cannot serve pt.  Phoned [facility] in [place] [phone] and they accept pt's insurance and can serve.  Faxed order to [phone]. Sent pt update in portal.

## 2018-07-23 NOTE — TELEPHONE ENCOUNTER
Reviewed pt's insurance benefits then faxed Gabriel lift order to [facility] [phone].  Notified pt by portal message.

## 2018-07-24 ENCOUNTER — PATIENT MESSAGE (OUTPATIENT)
Dept: PSYCHIATRY | Facility: CLINIC | Age: 58
End: 2018-07-24

## 2018-07-24 ENCOUNTER — TELEPHONE (OUTPATIENT)
Dept: NEUROLOGY | Facility: CLINIC | Age: 58
End: 2018-07-24

## 2018-07-24 RX ORDER — ACETAMINOPHEN 325 MG/1
1000 TABLET ORAL
Status: CANCELLED | OUTPATIENT
Start: 2018-07-24

## 2018-07-24 RX ORDER — SODIUM CHLORIDE 0.9 % (FLUSH) 0.9 %
10 SYRINGE (ML) INJECTION
Status: CANCELLED | OUTPATIENT
Start: 2018-07-24

## 2018-07-24 RX ORDER — FAMOTIDINE 10 MG/ML
20 INJECTION INTRAVENOUS
Status: CANCELLED | OUTPATIENT
Start: 2018-07-24

## 2018-07-24 RX ORDER — HEPARIN 100 UNIT/ML
100 SYRINGE INTRAVENOUS
Status: CANCELLED | OUTPATIENT
Start: 2018-07-24

## 2018-07-25 ENCOUNTER — TELEPHONE (OUTPATIENT)
Dept: PSYCHIATRY | Facility: CLINIC | Age: 58
End: 2018-07-25

## 2018-07-25 ENCOUNTER — PATIENT MESSAGE (OUTPATIENT)
Dept: NEUROLOGY | Facility: CLINIC | Age: 58
End: 2018-07-25

## 2018-07-25 NOTE — TELEPHONE ENCOUNTER
Faxed signed order and visit notes to [facility], attn: Jacqui [phone] for pt's Gabriel lift.

## 2018-07-31 ENCOUNTER — TELEPHONE (OUTPATIENT)
Dept: NEUROLOGY | Facility: CLINIC | Age: 58
End: 2018-07-31

## 2018-07-31 NOTE — TELEPHONE ENCOUNTER
Call received from chemo infusion nurse. Pt no showed to today's infusion appt. VM left for Jayesh to call this office back.

## 2018-08-02 NOTE — TELEPHONE ENCOUNTER
Call placed to pt's , Jayesh. He states pt does not check her voice mail, and they just realized she missed her infusion appt. Informed him appts scheduled for 8/14 at 8/28, both at 8am. They are in agreement with this. Appt reminders also postal mailed to pt.

## 2018-08-07 ENCOUNTER — HOSPITAL ENCOUNTER (OUTPATIENT)
Dept: RADIOLOGY | Facility: HOSPITAL | Age: 58
Discharge: HOME | End: 2018-08-07
Attending: PSYCHIATRY & NEUROLOGY
Payer: COMMERCIAL

## 2018-08-07 ENCOUNTER — CLINICAL SUPPORT (OUTPATIENT)
Dept: INFECTIOUS DISEASES | Facility: CLINIC | Age: 58
End: 2018-08-07
Payer: COMMERCIAL

## 2018-08-07 ENCOUNTER — OFFICE VISIT (OUTPATIENT)
Dept: INFECTIOUS DISEASES | Facility: CLINIC | Age: 58
End: 2018-08-07
Payer: COMMERCIAL

## 2018-08-07 VITALS
TEMPERATURE: 98 F | HEIGHT: 60 IN | SYSTOLIC BLOOD PRESSURE: 144 MMHG | HEART RATE: 114 BPM | DIASTOLIC BLOOD PRESSURE: 93 MMHG

## 2018-08-07 DIAGNOSIS — G35 MS (MULTIPLE SCLEROSIS): ICD-10-CM

## 2018-08-07 DIAGNOSIS — Z71.85 IMMUNIZATION COUNSELING: Primary | ICD-10-CM

## 2018-08-07 PROCEDURE — 72157 MRI CHEST SPINE W/O & W/DYE: CPT | Mod: TC

## 2018-08-07 PROCEDURE — 90472 IMMUNIZATION ADMIN EACH ADD: CPT | Mod: S$GLB,,, | Performed by: INTERNAL MEDICINE

## 2018-08-07 PROCEDURE — 72157 MRI CHEST SPINE W/O & W/DYE: CPT | Mod: 26,,, | Performed by: RADIOLOGY

## 2018-08-07 PROCEDURE — 25500020 PHARM REV CODE 255: Performed by: PSYCHIATRY & NEUROLOGY

## 2018-08-07 PROCEDURE — 90471 IMMUNIZATION ADMIN: CPT | Mod: S$GLB,,, | Performed by: INTERNAL MEDICINE

## 2018-08-07 PROCEDURE — 99999 PR PBB SHADOW E&M-EST. PATIENT-LVL III: CPT | Mod: PBBFAC,,, | Performed by: INTERNAL MEDICINE

## 2018-08-07 PROCEDURE — A9585 GADOBUTROL INJECTION: HCPCS | Performed by: PSYCHIATRY & NEUROLOGY

## 2018-08-07 PROCEDURE — 90636 HEP A/HEP B VACC ADULT IM: CPT | Mod: S$GLB,,, | Performed by: INTERNAL MEDICINE

## 2018-08-07 PROCEDURE — 70553 MRI BRAIN STEM W/O & W/DYE: CPT | Mod: TC

## 2018-08-07 PROCEDURE — 70553 MRI BRAIN STEM W/O & W/DYE: CPT | Mod: 26,,, | Performed by: RADIOLOGY

## 2018-08-07 PROCEDURE — 99203 OFFICE O/P NEW LOW 30 MIN: CPT | Mod: S$GLB,,, | Performed by: INTERNAL MEDICINE

## 2018-08-07 PROCEDURE — 72156 MRI NECK SPINE W/O & W/DYE: CPT | Mod: TC

## 2018-08-07 PROCEDURE — 72156 MRI NECK SPINE W/O & W/DYE: CPT | Mod: 26,,, | Performed by: RADIOLOGY

## 2018-08-07 PROCEDURE — 90670 PCV13 VACCINE IM: CPT | Mod: S$GLB,,, | Performed by: INTERNAL MEDICINE

## 2018-08-07 RX ORDER — GADOBUTROL 604.72 MG/ML
8 INJECTION INTRAVENOUS
Status: COMPLETED | OUTPATIENT
Start: 2018-08-07 | End: 2018-08-07

## 2018-08-07 RX ADMIN — GADOBUTROL 8 ML: 604.72 INJECTION INTRAVENOUS at 03:08

## 2018-08-07 NOTE — PROGRESS NOTES
Pt received the Hepatitis A/B and Prevnar 13 vaccinations. Pt tolerated the injections well. Return appts provided. Pt left the unit in NAD.

## 2018-08-07 NOTE — PROGRESS NOTES
Subjective:      Patient ID: Deb Figueroa is a 57 y.o. female.    Chief Complaint:No chief complaint on file.      History of Present Illness    Case of 56 y/o female who presents today for immunization counseling prior to immunosuppressive therapy with Ocrevus for MS. Patient lives with , denies outdoor or yard work. Patient is not up to date with vaccines, last in links data base td in 2015. Denies at this time fevers, chills, diarrhea, nausea, recent viral illness or sick contacts.    Review of Systems   Constitution: Negative for chills, decreased appetite, fever, weakness, malaise/fatigue, night sweats, weight gain and weight loss.   HENT: Negative for congestion, ear pain, hearing loss, hoarse voice, sore throat and tinnitus.    Eyes: Negative for blurred vision, redness and visual disturbance.   Cardiovascular: Negative for chest pain, leg swelling and palpitations.   Respiratory: Negative for cough, hemoptysis, shortness of breath and sputum production.    Hematologic/Lymphatic: Negative for adenopathy. Does not bruise/bleed easily.   Skin: Negative for dry skin, itching, rash and suspicious lesions.   Musculoskeletal: Negative for back pain, joint pain, myalgias and neck pain.   Gastrointestinal: Negative for abdominal pain, constipation, diarrhea, heartburn, nausea and vomiting.   Genitourinary: Negative for dysuria, flank pain, frequency, hematuria, hesitancy and urgency.   Neurological: Negative for dizziness, headaches, numbness and paresthesias.   Psychiatric/Behavioral: Negative for depression and memory loss. The patient does not have insomnia and is not nervous/anxious.      Objective:   Physical Exam   Constitutional: She is oriented to person, place, and time. She appears well-developed and well-nourished.   HENT:   Head: Normocephalic and atraumatic.   Eyes: EOM are normal. Pupils are equal, round, and reactive to light.   Neck: Normal range of motion.   Cardiovascular: Normal rate,  regular rhythm and normal heart sounds.    Pulmonary/Chest: Effort normal and breath sounds normal.   Abdominal: Soft. Bowel sounds are normal.   Musculoskeletal: Normal range of motion. She exhibits no edema.   Neurological: She is alert and oriented to person, place, and time.   Skin: No rash noted.     Assessment:       1. Immunization counseling      58 y/o female with MS about to start Ocrevus therapy. Prior to clinic visit had labs to screen for exposure to latent infections. Was negative on TB and strongyloides screening. Has past exposure to chicken pox. Viral hepatitis panel shows no past exposures or infections to Hep A, B or C. Recommend to administer today hep A and B vaccines. Also recommend to administer Prevnar today and pneumovax in 2 months. Ordered Shingrix vaccine to be dispensed at patient's pharmacy to be administered once and then second dose in 6 months time. Advised to return at the end of September to have yearly flu shot and to remember td booster every 10 yrs due in 2025. Can return to clinic PRN.       Plan:       Immunization counseling  -     Hepatitis A hepatitis B combined vaccine IM; Standing  -     PNEUMOCOCCAL CONJUGATE VACCINE 13-VALENT LESS THAN 4YO & GREATER THAN 49YO IM; Future; Expected date: 08/07/2018  -     Pneumococcal polysaccharide vaccine 23-valent greater than or equal to 3yo subcutaneous/IM; Future; Expected date: 11/07/2018  -     varicella-zoster [id number], PF, (SHINGRIX, PF,) 50 mcg/0.5 mL injection; Inject 0.5 mLs into the muscle every 6 (six) months. for 2 doses  Dispense: 1 mL; Refill: 0

## 2018-08-14 ENCOUNTER — INFUSION (OUTPATIENT)
Dept: INFUSION THERAPY | Facility: HOSPITAL | Age: 58
End: 2018-08-14
Attending: PSYCHIATRY & NEUROLOGY
Payer: COMMERCIAL

## 2018-08-14 VITALS
DIASTOLIC BLOOD PRESSURE: 89 MMHG | TEMPERATURE: 98 F | BODY MASS INDEX: 30.81 KG/M2 | RESPIRATION RATE: 16 BRPM | HEIGHT: 60 IN | SYSTOLIC BLOOD PRESSURE: 138 MMHG | WEIGHT: 156.94 LBS | HEART RATE: 102 BPM

## 2018-08-14 DIAGNOSIS — G35 MULTIPLE SCLEROSIS: Primary | ICD-10-CM

## 2018-08-14 PROCEDURE — S0028 INJECTION, FAMOTIDINE, 20 MG: HCPCS | Performed by: PSYCHIATRY & NEUROLOGY

## 2018-08-14 PROCEDURE — 63600175 PHARM REV CODE 636 W HCPCS: Performed by: PSYCHIATRY & NEUROLOGY

## 2018-08-14 PROCEDURE — 96366 THER/PROPH/DIAG IV INF ADDON: CPT

## 2018-08-14 PROCEDURE — 96367 TX/PROPH/DG ADDL SEQ IV INF: CPT

## 2018-08-14 PROCEDURE — 25000003 PHARM REV CODE 250: Performed by: PSYCHIATRY & NEUROLOGY

## 2018-08-14 PROCEDURE — 96375 TX/PRO/DX INJ NEW DRUG ADDON: CPT

## 2018-08-14 PROCEDURE — 96365 THER/PROPH/DIAG IV INF INIT: CPT

## 2018-08-14 RX ORDER — FAMOTIDINE 10 MG/ML
20 INJECTION INTRAVENOUS
Status: COMPLETED | OUTPATIENT
Start: 2018-08-14 | End: 2018-08-14

## 2018-08-14 RX ORDER — ACETAMINOPHEN 500 MG
1000 TABLET ORAL
Status: CANCELLED | OUTPATIENT
Start: 2018-08-14

## 2018-08-14 RX ORDER — SODIUM CHLORIDE 0.9 % (FLUSH) 0.9 %
10 SYRINGE (ML) INJECTION
Status: CANCELLED | OUTPATIENT
Start: 2018-08-14

## 2018-08-14 RX ORDER — ACETAMINOPHEN 500 MG
1000 TABLET ORAL
Status: COMPLETED | OUTPATIENT
Start: 2018-08-14 | End: 2018-08-14

## 2018-08-14 RX ORDER — FAMOTIDINE 10 MG/ML
20 INJECTION INTRAVENOUS
Status: CANCELLED | OUTPATIENT
Start: 2018-08-14

## 2018-08-14 RX ORDER — HEPARIN 100 UNIT/ML
100 SYRINGE INTRAVENOUS
Status: CANCELLED | OUTPATIENT
Start: 2018-08-14

## 2018-08-14 RX ADMIN — DEXTROSE: 50 INJECTION, SOLUTION INTRAVENOUS at 08:08

## 2018-08-14 RX ADMIN — OCRELIZUMAB 300 MG: 300 INJECTION INTRAVENOUS at 09:08

## 2018-08-14 RX ADMIN — SODIUM CHLORIDE: 0.9 INJECTION, SOLUTION INTRAVENOUS at 08:08

## 2018-08-14 RX ADMIN — ACETAMINOPHEN 1000 MG: 500 TABLET, FILM COATED ORAL at 08:08

## 2018-08-14 RX ADMIN — DIPHENHYDRAMINE HYDROCHLORIDE 50 MG: 50 INJECTION, SOLUTION INTRAMUSCULAR; INTRAVENOUS at 08:08

## 2018-08-14 RX ADMIN — FAMOTIDINE 20 MG: 10 INJECTION INTRAVENOUS at 08:08

## 2018-08-14 NOTE — PLAN OF CARE
Problem: Multiple Sclerosis (Adult,Obstetrics,Pediatric)  Goal: Signs and Symptoms of Listed Potential Problems Will be Absent, Minimized or Managed (Multiple Sclerosis)  Signs and symptoms of listed potential problems will be absent, minimized or managed by discharge/transition of care (reference Multiple Sclerosis (Adult,Obstetrics,Pediatric) CPG).  Outcome: Ongoing (interventions implemented as appropriate)  Patient here for Ocrevus #1.  Assessment complete and labs reviewed.  Educated patient on Ocrevus mechanism of action, side effects, and when to seek medical attention; issued [facility] handout.  VSS.  Chair reclined and blanket offered.  No needs expressed at this time.  Will continue to offer.

## 2018-08-14 NOTE — PLAN OF CARE
Problem: Patient Care Overview (Adult)  Goal: Plan of Care Review  Outcome: Ongoing (interventions implemented as appropriate)  Patient tolerated infusion.  No reaction suspected.  VSS.  No questions or concerns.  AVS given to patient.  Will RTC in 2 weeks for second infusion.  Patient left unit on motorized scooter.

## 2018-08-15 RX ORDER — ESCITALOPRAM OXALATE 20 MG/1
20 TABLET ORAL NIGHTLY
Qty: 30 TABLET | Refills: 3 | Status: SHIPPED | OUTPATIENT
Start: 2018-08-15

## 2018-08-24 ENCOUNTER — PATIENT MESSAGE (OUTPATIENT)
Dept: NEUROLOGY | Facility: CLINIC | Age: 58
End: 2018-08-24

## 2018-08-24 DIAGNOSIS — N28.1 RENAL CYST: Primary | ICD-10-CM

## 2018-08-24 DIAGNOSIS — E04.1 THYROID NODULE: ICD-10-CM

## 2018-08-24 NOTE — TELEPHONE ENCOUNTER
MRIs show thyroid nodule--US recommended, and renal cyst, renal US also recommended; I informed pt's . Please schedule these for Sept 7th here, and also a f/u with KK that day if possible; thanks

## 2018-08-26 NOTE — PROGRESS NOTES
I have reviewed the notes, assessments, and/or procedures performed this visit on august 7, and I concur with the documentation.

## 2018-08-28 ENCOUNTER — INFUSION (OUTPATIENT)
Dept: INFUSION THERAPY | Facility: HOSPITAL | Age: 58
End: 2018-08-28
Attending: PSYCHIATRY & NEUROLOGY
Payer: COMMERCIAL

## 2018-08-28 ENCOUNTER — PATIENT MESSAGE (OUTPATIENT)
Dept: NEUROLOGY | Facility: CLINIC | Age: 58
End: 2018-08-28

## 2018-08-28 VITALS
HEIGHT: 60 IN | BODY MASS INDEX: 32.11 KG/M2 | DIASTOLIC BLOOD PRESSURE: 91 MMHG | SYSTOLIC BLOOD PRESSURE: 148 MMHG | WEIGHT: 163.56 LBS | TEMPERATURE: 98 F | RESPIRATION RATE: 18 BRPM | HEART RATE: 100 BPM

## 2018-08-28 DIAGNOSIS — G35 MULTIPLE SCLEROSIS: Primary | ICD-10-CM

## 2018-08-28 PROCEDURE — 96375 TX/PRO/DX INJ NEW DRUG ADDON: CPT

## 2018-08-28 PROCEDURE — 63600175 PHARM REV CODE 636 W HCPCS: Performed by: PSYCHIATRY & NEUROLOGY

## 2018-08-28 PROCEDURE — S0028 INJECTION, FAMOTIDINE, 20 MG: HCPCS | Performed by: PSYCHIATRY & NEUROLOGY

## 2018-08-28 PROCEDURE — 96365 THER/PROPH/DIAG IV INF INIT: CPT

## 2018-08-28 PROCEDURE — 96367 TX/PROPH/DG ADDL SEQ IV INF: CPT

## 2018-08-28 PROCEDURE — 96366 THER/PROPH/DIAG IV INF ADDON: CPT

## 2018-08-28 PROCEDURE — 25000003 PHARM REV CODE 250: Performed by: PSYCHIATRY & NEUROLOGY

## 2018-08-28 RX ORDER — HEPARIN 100 UNIT/ML
100 SYRINGE INTRAVENOUS
Status: CANCELLED | OUTPATIENT
Start: 2018-08-28

## 2018-08-28 RX ORDER — ACETAMINOPHEN 500 MG
1000 TABLET ORAL
Status: CANCELLED | OUTPATIENT
Start: 2018-08-28

## 2018-08-28 RX ORDER — SODIUM CHLORIDE 0.9 % (FLUSH) 0.9 %
10 SYRINGE (ML) INJECTION
Status: CANCELLED | OUTPATIENT
Start: 2018-08-28

## 2018-08-28 RX ORDER — FAMOTIDINE 10 MG/ML
20 INJECTION INTRAVENOUS
Status: COMPLETED | OUTPATIENT
Start: 2018-08-28 | End: 2018-08-28

## 2018-08-28 RX ORDER — FAMOTIDINE 10 MG/ML
20 INJECTION INTRAVENOUS
Status: CANCELLED | OUTPATIENT
Start: 2018-08-28

## 2018-08-28 RX ORDER — ACETAMINOPHEN 500 MG
1000 TABLET ORAL
Status: COMPLETED | OUTPATIENT
Start: 2018-08-28 | End: 2018-08-28

## 2018-08-28 RX ADMIN — SODIUM CHLORIDE: 0.9 INJECTION, SOLUTION INTRAVENOUS at 08:08

## 2018-08-28 RX ADMIN — OCRELIZUMAB 300 MG: 300 INJECTION INTRAVENOUS at 09:08

## 2018-08-28 RX ADMIN — FAMOTIDINE 20 MG: 10 INJECTION, SOLUTION INTRAVENOUS at 08:08

## 2018-08-28 RX ADMIN — DEXTROSE: 50 INJECTION, SOLUTION INTRAVENOUS at 08:08

## 2018-08-28 RX ADMIN — ACETAMINOPHEN 1000 MG: 500 TABLET, FILM COATED ORAL at 08:08

## 2018-08-28 RX ADMIN — DIPHENHYDRAMINE HYDROCHLORIDE 50 MG: 50 INJECTION, SOLUTION INTRAMUSCULAR; INTRAVENOUS at 08:08

## 2018-08-28 NOTE — PLAN OF CARE
Problem: Patient Care Overview  Goal: Plan of Care Review  Pt tolerated C2 ocrevus and observation period without adverse effects. VSS. Provided AVS & verbalized understanding of RTC date. DC via scooter independently. Verbalized s/s to report to MD.

## 2018-08-28 NOTE — PLAN OF CARE
Problem: Multiple Sclerosis (Adult,Obstetrics,Pediatric)  Goal: Signs and Symptoms of Listed Potential Problems Will be Absent, Minimized or Managed (Multiple Sclerosis)  Signs and symptoms of listed potential problems will be absent, minimized or managed by discharge/transition of care (reference Multiple Sclerosis (Adult,Obstetrics,Pediatric) CPG).  Outcome: Ongoing (interventions implemented as appropriate)  Labs , hx, and medications reviewed. Assessment completed. Discussed plan of care with patient. Patient in agreement. VSS.  Chair reclined and warm blanket and snack offered. Will cont to monitor

## 2018-09-03 ENCOUNTER — HOSPITAL ENCOUNTER (INPATIENT)
Facility: HOSPITAL | Age: 58
LOS: 2 days | Discharge: HOME HEALTH | DRG: 872 | End: 2018-09-05
Attending: HOSPITALIST | Admitting: HOSPITALIST
Payer: COMMERCIAL

## 2018-09-03 DIAGNOSIS — A41.9 SEPSIS DUE TO URINARY TRACT INFECTION: ICD-10-CM

## 2018-09-03 DIAGNOSIS — N39.0 SEPSIS DUE TO URINARY TRACT INFECTION: ICD-10-CM

## 2018-09-03 PROBLEM — N12 EMPHYSEMATOUS PYELITIS: Status: ACTIVE | Noted: 2018-09-03

## 2018-09-03 PROBLEM — N20.0 RIGHT NEPHROLITHIASIS: Status: ACTIVE | Noted: 2018-09-03

## 2018-09-03 PROBLEM — E78.5 HYPERLIPIDEMIA: Status: ACTIVE | Noted: 2018-09-03

## 2018-09-03 LAB
ALBUMIN SERPL BCP-MCNC: 3.4 G/DL
ALP SERPL-CCNC: 102 U/L
ALT SERPL W/O P-5'-P-CCNC: 10 U/L
ANION GAP SERPL CALC-SCNC: 8 MMOL/L
AST SERPL-CCNC: 15 U/L
BACTERIA #/AREA URNS HPF: ABNORMAL /HPF
BASOPHILS # BLD AUTO: 0.01 K/UL
BASOPHILS NFR BLD: 0.1 %
BILIRUB SERPL-MCNC: 0.6 MG/DL
BILIRUB UR QL STRIP: NEGATIVE
BUN SERPL-MCNC: 13 MG/DL
CALCIUM SERPL-MCNC: 9.4 MG/DL
CHLORIDE SERPL-SCNC: 103 MMOL/L
CLARITY UR: CLEAR
CO2 SERPL-SCNC: 26 MMOL/L
COLOR UR: YELLOW
CREAT SERPL-MCNC: 0.9 MG/DL
DIFFERENTIAL METHOD: ABNORMAL
EOSINOPHIL # BLD AUTO: 0.1 K/UL
EOSINOPHIL NFR BLD: 0.5 %
ERYTHROCYTE [DISTWIDTH] IN BLOOD BY AUTOMATED COUNT: 14.5 %
EST. GFR  (AFRICAN AMERICAN): >60 ML/MIN/1.73 M^2
EST. GFR  (NON AFRICAN AMERICAN): >60 ML/MIN/1.73 M^2
GLUCOSE SERPL-MCNC: 134 MG/DL
GLUCOSE UR QL STRIP: NEGATIVE
HCT VFR BLD AUTO: 37.3 %
HGB BLD-MCNC: 12 G/DL
HGB UR QL STRIP: ABNORMAL
INR PPP: 1.1
KETONES UR QL STRIP: NEGATIVE
LEUKOCYTE ESTERASE UR QL STRIP: ABNORMAL
LYMPHOCYTES # BLD AUTO: 1.3 K/UL
LYMPHOCYTES NFR BLD: 11.6 %
MAGNESIUM SERPL-MCNC: 2.2 MG/DL
MCH RBC QN AUTO: 26.2 PG
MCHC RBC AUTO-ENTMCNC: 32.2 G/DL
MCV RBC AUTO: 81 FL
MICROSCOPIC COMMENT: ABNORMAL
MONOCYTES # BLD AUTO: 0.9 K/UL
MONOCYTES NFR BLD: 7.7 %
NEUTROPHILS # BLD AUTO: 8.8 K/UL
NEUTROPHILS NFR BLD: 79.8 %
NITRITE UR QL STRIP: NEGATIVE
PH UR STRIP: 7 [PH] (ref 5–8)
PHOSPHATE SERPL-MCNC: 2.7 MG/DL
PLATELET # BLD AUTO: 252 K/UL
PMV BLD AUTO: 10.9 FL
POTASSIUM SERPL-SCNC: 3.4 MMOL/L
PROT SERPL-MCNC: 7.6 G/DL
PROT UR QL STRIP: NEGATIVE
PROTHROMBIN TIME: 11.1 SEC
RBC # BLD AUTO: 4.58 M/UL
RBC #/AREA URNS HPF: 3 /HPF (ref 0–4)
SODIUM SERPL-SCNC: 137 MMOL/L
SP GR UR STRIP: 1.02 (ref 1–1.03)
SQUAMOUS #/AREA URNS HPF: 5 /HPF
URN SPEC COLLECT METH UR: ABNORMAL
UROBILINOGEN UR STRIP-ACNC: NEGATIVE EU/DL
WBC # BLD AUTO: 11.04 K/UL
WBC #/AREA URNS HPF: 25 /HPF (ref 0–5)
WBC CLUMPS URNS QL MICRO: ABNORMAL

## 2018-09-03 PROCEDURE — 94761 N-INVAS EAR/PLS OXIMETRY MLT: CPT

## 2018-09-03 PROCEDURE — 80053 COMPREHEN METABOLIC PANEL: CPT

## 2018-09-03 PROCEDURE — 81000 URINALYSIS NONAUTO W/SCOPE: CPT

## 2018-09-03 PROCEDURE — 63600175 PHARM REV CODE 636 W HCPCS: Performed by: HOSPITALIST

## 2018-09-03 PROCEDURE — 84100 ASSAY OF PHOSPHORUS: CPT

## 2018-09-03 PROCEDURE — 25000003 PHARM REV CODE 250: Performed by: HOSPITALIST

## 2018-09-03 PROCEDURE — 85610 PROTHROMBIN TIME: CPT

## 2018-09-03 PROCEDURE — A4216 STERILE WATER/SALINE, 10 ML: HCPCS | Performed by: HOSPITALIST

## 2018-09-03 PROCEDURE — 21400001 HC TELEMETRY ROOM

## 2018-09-03 PROCEDURE — 36415 COLL VENOUS BLD VENIPUNCTURE: CPT

## 2018-09-03 PROCEDURE — 83735 ASSAY OF MAGNESIUM: CPT

## 2018-09-03 PROCEDURE — 85025 COMPLETE CBC W/AUTO DIFF WBC: CPT

## 2018-09-03 PROCEDURE — 99223 1ST HOSP IP/OBS HIGH 75: CPT | Mod: ,,, | Performed by: UROLOGY

## 2018-09-03 PROCEDURE — 87086 URINE CULTURE/COLONY COUNT: CPT

## 2018-09-03 RX ORDER — DEXTROMETHORPHAN POLISTIREX 30 MG/5 ML
1 SUSPENSION, EXTENDED RELEASE 12 HR ORAL DAILY PRN
Status: DISCONTINUED | OUTPATIENT
Start: 2018-09-03 | End: 2018-09-05 | Stop reason: HOSPADM

## 2018-09-03 RX ORDER — CLONIDINE HYDROCHLORIDE 0.1 MG/1
0.2 TABLET ORAL EVERY 6 HOURS PRN
Status: DISCONTINUED | OUTPATIENT
Start: 2018-09-03 | End: 2018-09-05 | Stop reason: HOSPADM

## 2018-09-03 RX ORDER — AMOXICILLIN 250 MG
1 CAPSULE ORAL DAILY
Status: DISCONTINUED | OUTPATIENT
Start: 2018-09-03 | End: 2018-09-05 | Stop reason: HOSPADM

## 2018-09-03 RX ORDER — ACETAMINOPHEN 325 MG/1
650 TABLET ORAL EVERY 8 HOURS PRN
Status: DISCONTINUED | OUTPATIENT
Start: 2018-09-03 | End: 2018-09-05 | Stop reason: HOSPADM

## 2018-09-03 RX ORDER — METHOCARBAMOL 500 MG/1
500 TABLET, FILM COATED ORAL 3 TIMES DAILY PRN
Status: DISCONTINUED | OUTPATIENT
Start: 2018-09-03 | End: 2018-09-05 | Stop reason: HOSPADM

## 2018-09-03 RX ORDER — BISACODYL 10 MG
10 SUPPOSITORY, RECTAL RECTAL DAILY PRN
Status: DISCONTINUED | OUTPATIENT
Start: 2018-09-03 | End: 2018-09-05 | Stop reason: HOSPADM

## 2018-09-03 RX ORDER — PROMETHAZINE HYDROCHLORIDE 25 MG/1
25 SUPPOSITORY RECTAL EVERY 6 HOURS PRN
Status: DISCONTINUED | OUTPATIENT
Start: 2018-09-03 | End: 2018-09-05 | Stop reason: HOSPADM

## 2018-09-03 RX ORDER — DEXTROSE, SODIUM CHLORIDE, SODIUM LACTATE, POTASSIUM CHLORIDE, AND CALCIUM CHLORIDE 5; .6; .31; .03; .02 G/100ML; G/100ML; G/100ML; G/100ML; G/100ML
INJECTION, SOLUTION INTRAVENOUS CONTINUOUS
Status: ACTIVE | OUTPATIENT
Start: 2018-09-03 | End: 2018-09-04

## 2018-09-03 RX ORDER — ONDANSETRON 2 MG/ML
8 INJECTION INTRAMUSCULAR; INTRAVENOUS EVERY 8 HOURS PRN
Status: DISCONTINUED | OUTPATIENT
Start: 2018-09-03 | End: 2018-09-05 | Stop reason: HOSPADM

## 2018-09-03 RX ORDER — MORPHINE SULFATE 10 MG/ML
5 INJECTION, SOLUTION INTRAMUSCULAR; INTRAVENOUS EVERY 4 HOURS PRN
Status: DISCONTINUED | OUTPATIENT
Start: 2018-09-03 | End: 2018-09-05 | Stop reason: HOSPADM

## 2018-09-03 RX ORDER — HYDROCODONE BITARTRATE AND ACETAMINOPHEN 5; 325 MG/1; MG/1
1 TABLET ORAL EVERY 4 HOURS PRN
Status: DISCONTINUED | OUTPATIENT
Start: 2018-09-03 | End: 2018-09-05 | Stop reason: HOSPADM

## 2018-09-03 RX ORDER — POLYETHYLENE GLYCOL 3350 17 G/17G
17 POWDER, FOR SOLUTION ORAL DAILY PRN
Status: DISCONTINUED | OUTPATIENT
Start: 2018-09-03 | End: 2018-09-05 | Stop reason: HOSPADM

## 2018-09-03 RX ORDER — SODIUM CHLORIDE 0.9 % (FLUSH) 0.9 %
3 SYRINGE (ML) INJECTION EVERY 8 HOURS
Status: DISCONTINUED | OUTPATIENT
Start: 2018-09-03 | End: 2018-09-05 | Stop reason: HOSPADM

## 2018-09-03 RX ADMIN — Medication 3 ML: at 05:09

## 2018-09-03 RX ADMIN — DOCUSATE SODIUM AND SENNOSIDES 1 TABLET: 8.6; 5 TABLET, FILM COATED ORAL at 08:09

## 2018-09-03 RX ADMIN — HYDROCODONE BITARTRATE AND ACETAMINOPHEN 1 TABLET: 5; 325 TABLET ORAL at 09:09

## 2018-09-03 RX ADMIN — ACETAMINOPHEN 650 MG: 325 TABLET, FILM COATED ORAL at 04:09

## 2018-09-03 RX ADMIN — Medication 3 ML: at 02:09

## 2018-09-03 RX ADMIN — Medication 3 ML: at 09:09

## 2018-09-03 RX ADMIN — PIPERACILLIN AND TAZOBACTAM 4.5 G: 4; .5 INJECTION, POWDER, LYOPHILIZED, FOR SOLUTION INTRAVENOUS; PARENTERAL at 06:09

## 2018-09-03 RX ADMIN — PIPERACILLIN AND TAZOBACTAM 4.5 G: 4; .5 INJECTION, POWDER, LYOPHILIZED, FOR SOLUTION INTRAVENOUS; PARENTERAL at 09:09

## 2018-09-03 RX ADMIN — DEXTROSE, SODIUM CHLORIDE, SODIUM LACTATE, POTASSIUM CHLORIDE, AND CALCIUM CHLORIDE: 5; .6; .31; .03; .02 INJECTION, SOLUTION INTRAVENOUS at 03:09

## 2018-09-03 RX ADMIN — PIPERACILLIN AND TAZOBACTAM 4.5 G: 4; .5 INJECTION, POWDER, LYOPHILIZED, FOR SOLUTION INTRAVENOUS; PARENTERAL at 02:09

## 2018-09-03 NOTE — H&P
[facility] Hospital Medicine  History & Physical    Patient Name: Deb Figueroa  MRN: [id number]  Admission Date: 09/03/2018  Attending Physician: Josh Reeves MD, MPH      PCP:     Primary Doctor No    CC:     Chief Complaint   Patient presents with    Urinary Tract Infection     [facility] for evaluation of sepsis secondary to urinary tract infection and recurrent large right-sided nephrolithiasis       HISTORY OF PRESENT ILLNESS:     Deb Figueroa is a 57 y.o. female that (in part)  has a past medical history of Hypertension, MS (multiple sclerosis), and Nephrolithiasis.  Presents to [facility] from the [facility] Emergency Department complaining of right flank pain as described below in detail.   Two years ago she underwent lithotripsy for a large nephrolithiasis.  She was concerned she may have a recurrence as well as a urinary tract infection.   Unreliable historian with very poor short and long-term memory.     Description of symptoms    Location:  Right flank  Onset:  Subacute onset of progressive worsening  Character:  Deep aching pain  Frequency:  Daily;  she has a history of previous nephrolithiasis.  Duration:  Constant duration  Associated Symptoms:  Fever, malaise, dysuria  Radiation:  Radiation throughout right abdomen and flank  Exacerbating factors:  Worsened with percussion  Relieving factors:  Pain medications given in the ED       In the emergency department routine laboratory studies, urinalysis, and CT abdomen pelvis were performed.   She had leukocytosis with a white count of 16,600.  Urine was cloudy and had RBCs.  CT confirmed large nephrolith measuring 29 mm x 26 mm x 35 mm with air in the bladder.  Transfer center was contacted by [facility] and Hospital medicine has been asked to admit for further evaluation and treatment, including an urgent consultation to Urology.       REVIEW OF SYSTEMS:      -- Constitutional: + fever and chills.  Generalized malaise.  -- Eyes: No visual changes, diplopia, pain, tearing, blind spots, or discharge.   -- Ears, nose, mouth, throat, and face: No congestion, sore throat, epistaxis, d/c, bleeding gums, neck stiffness masses, or dental issues.  -- Respiratory: No cough, shortness of breath, hemoptysis, stridor, wheezing, or night sweats.  -- Cardiovascular: No chest pain, GLORIA, syncope, PND, edema, cyanosis, or palpitations.   -- Gastrointestinal:  Right flank pain.  No vomiting, hematemesis, melena, dyspepsia, or change in bowel habits.  -- Genitourinary:  As above in HPI.  -- Integument/breast: No rash, pruritis, pigmentation changes, dryness, or changes in hair  -- Hematologic/lymphatic: No easy bruising or lymphadenopathy.   -- Musculoskeletal: ambulates w/ walker.  Lower ext weekness  -- Neurological: Hx of M.S.  very poor short and long-term memory.  +Debility.   -- Behavioral/Psych: No auditory or visual hallucinations, depression, or suicidal/homicidal ideations.  -- Endocrine: No heat or cold intolerance, polydipsia, or unintentional weight gain / loss.  -- Allergy/Immunologic: No recurrent infections or adverse reaction to food, insects, or difficulty breathing.    Pain Scale  5-6 on scale of 1 to 10    PAST MEDICAL / SURGICAL HISTORY:     Past Medical History:   Diagnosis Date    Hypertension     MS (multiple sclerosis)     Nephrolithiasis      Past Surgical History:   Procedure Laterality Date    LITHOTRIPSY  2016    Large renal stone.         FAMILY HISTORY:     Family History   Problem Relation Age of Onset    Cancer Father         prostate    Hypertension Mother     Breast cancer Neg Hx     Ovarian cancer Neg Hx     Colon cancer Neg Hx          SOCIAL HISTORY:     Social History     Socioeconomic History    Marital status:      Spouse name: None    Number of children: None    Years of education: None    Highest education level: None    Social Needs    Financial resource strain: None    Food insecurity - worry: None    Food insecurity - inability: None    Transportation needs - medical: None    Transportation needs - non-medical: None   Occupational History    None   Tobacco Use    Smoking status: Never Smoker    Smokeless tobacco: Never Used   Substance and Sexual Activity    Alcohol use: No    Drug use: No    Sexual activity: Yes     Partners: Male     Comment:    Other Topics Concern    None   Social History Narrative    None         ALLERGIES:       Review of patient's allergies indicates:   Allergen Reactions    Vancomycin analogues Itching         HEALTH SCREENING:     Prevnar 13 pneumonia vaccine = no evidence of previous vaccination found in the medical record      HOME MEDICATIONS:     Prior to Admission medications    Medication Sig Start Date End Date Taking? Authorizing Provider   amlodipine (NORVASC) 5 MG tablet Take 5 mg by mouth once daily.    Historical Provider, MD   ARIPiprazole (ABILIFY) 5 MG Tab Take 1 tablet (5 mg total) by mouth once daily. 7/18/18 7/18/19  Meghana Zimmer MD   escitalopram oxalate (LEXAPRO) 10 MG tablet  3/7/18   Historical Provider, MD   escitalopram oxalate (LEXAPRO) 20 MG tablet TAKE 1 TABLET (20 MG TOTAL) BY MOUTH EVERY EVENING. 8/15/18   Josh Moon MD   lorazepam (ATIVAN) 2 MG Tab Take 1 tablet (2 mg total) by mouth 3 (three) times daily as needed. 8/4/17   Josh Moon MD   mirabegron (MYRBETRIQ) 25 mg Tb24 ER tablet Take 1 tablet (25 mg total) by mouth once daily. 7/18/18 7/18/19  Meghana Zimmer MD   OXcarbazepine (TRILEPTAL) 300 MG Tab TAKE 1 TABLET (300 MG TOTAL) BY MOUTH 2 (TWO) TIMES DAILY. 7/23/18 7/23/19  Josh Moon MD   rosuvastatin (CRESTOR) 10 MG tablet  7/3/18   Historical Provider, MD   varicella-zoster [id number]-AS01B, PF, (SHINGRIX, PF,) 50 mcg/0.5 mL injection Inject 0.5 mLs into the muscle every 6 (six) months. for 2 doses 8/7/18 2/4/19   Thad Rios MD          [facility] MEDICATIONS:     Scheduled Meds:   piperacillin-tazobactam (ZOSYN) IVPB  4.5 g Intravenous Q8H    senna-docusate 8.6-50 mg  1 tablet Oral Daily    sodium chloride 0.9%  3 mL Intravenous Q8H     Continuous Infusions:   dextrose 5% lactated ringers       PRN Meds:.acetaminophen, bisacodyl, cloNIDine, HYDROcodone-acetaminophen, mineral oil, morphine, ondansetron, polyethylene glycol, promethazine      PHYSICAL EXAM:     Wt Readings from Last 1 Encounters:   09/03/18 0203 72.7 kg (160 lb 4.4 oz)     Body mass index is 31.3 kg/m².  Vitals:    09/03/18 0203 09/03/18 0216   BP: (!) 135/94    BP Location: Right arm    Patient Position: Sitting    Pulse: 93    Resp: 18    Temp: 98.3 °F (36.8 °C)    TempSrc: Oral    SpO2: 96%    Weight: 72.7 kg (160 lb 4.4 oz)    Height:  5' (1.524 m)          -- General appearance: well developed. appears stated age   -- Head: normocephalic, atraumatic   -- Eyes: conjunctivae clear. Extraocular muscles intact  -- Nose: Nares normal. Septum midline.   -- Mouth/Throat: lips, mucosa, and tongue normal. no throat erythema.   -- Neck: supple, symmetrical, trachea midline, no JVD and thyroid not grossly enlarged, appears symmetric  -- Lungs: clear to auscultation bilaterally. normal respiratory effort. No use of accessory muscles.   -- Chest wall: no tenderness. equal bilateral chest rise   -- Heart: rapid rate and regular rhythm. S1, S2 normal.  no click, rub or gallop   -- Abdomen: obese, soft, non-tender, non-distended, non-tympanic; bowel sounds normal; megaly exam limited by body habitus  -- Extremities: no cyanosis, clubbing or edema.   -- Pulses: 2+ and symmetric   -- Skin: +neurofibromas;  color normal, texture normal, turgor normal. No rashes   -- Neurologic: globally decreased muscle strength and tone, particularly in bilat lower ext.  Poor memory.  CNII-XII intact. Mayi coma scale: eyes open spontaneously-4, oriented & converses-5,  obeys commands-6.      LABORATORY STUDIES:     PTA Labs  WBC 16.6  H/h 14.1/42.6  plt 316    Na 139  k 3.2  Cl 100  co2 25  Bun 12  Cr 0.9  glc 107    Remaining labs pending:    No results found for this or any previous visit (from the past 36 hour(s)).    No results found for: INR, PROTIME  No results found for: HGBA1C  No results for input(s): POCTGLUCOSE in the last 72 hours.        MICROBIOLOGY DATA:     Urine Culture, Routine   Date Value Ref Range Status   07/08/2014 ESCHERICHIA COLI  >100,000 cfu/ml    Final       Microbiology x 7d:   Microbiology Results (last 7 days)     ** No results found for the last 168 hours. **            IMAGING:      CT of abdomen pelvis at outside facility  nephrolith measuring 29 mm x 26 mm x 35 mm with air in the bladder.          CONSULTS:     IP CONSULT TO UROLOGY       ASSESSMENT & PLAN:     Primary Diagnosis:  Sepsis secondary to UTI    Active Hospital Problems    Diagnosis  POA    *Sepsis secondary to UTI [A41.9, N39.0]  Yes     Priority: 1 - High    Right nephrolithiasis [N20.0]  Yes     Priority: 2     Multiple sclerosis [G35]  Yes     Priority: 2     Hypertension [I10]  Yes     Priority: 3     Hyperlipidemia [E78.5]  Yes     Priority: 4     Sepsis due to urinary tract infection [A41.9, N39.0]  Yes      Resolved Hospital Problems   No resolved problems to display.       Sepsis secondary to UTI secondary to large right-sided nephrolithiasis   · As evidenced by UA, history, and CT imaging at outside facility  · Recurrent nephrolithiasis  · Urine culture and Gram stain obtained prior to antibiotics  · Given empiric antibiotics with Zosyn  · Will tailor antibiotic regimen according to culture & sensitivity results  · Maintain euvolemic status with IV fluids  · Obtain prior medical records for previous lithotripsy from 2 years ago; she cannot recall where this was performed.  · urology consult    Hypertension  · Goal while inpatient is a systolic blood pressure less than  160mmHg  · BP in acceptable range at this time  · Continue current home regimen with hold parameters; no beta blockers in the setting of sepsis  · PRN antihypertensives available    Hyperlipidemia   · Lipid panel - as an outpatient  · Cardiac diet  · Continue statin    Multiple Sclerosis  · Patient reports that she is not taking any medication right now   · Obtain prior medical records        VTE Risk Mitigation (From admission, onward)        Ordered     Place sequential compression device  Until discontinued      09/03/18 0147     Place ANEL hose  Until discontinued      09/03/18 0147     IP VTE HIGH RISK PATIENT  Once      09/03/18 0147            Adult PRN medications available   DVT prophylaxis given       DISPOSITION:     Will admit to the Hospital Medicine service for further evaluation and treatment.    Chart reviewed and updated where applicable.    High Risk Conditions:  Patient has a condition that poses threat to life and bodily function:  Sepsis secondary to UTI      ===============================================================    Josh Reeves MD, MPH  Department of Hospital Medicine   [facility]  [phone] pg  (7pm - 6am)          This note is dictated using Dragon Medical 360 voice recognition software.  There are word recognition mistakes that are occasionally missed on review.

## 2018-09-03 NOTE — HPI
56 y/o female w/ h/o nephrolithiasis presented to outside ER yesterday with acute onset right flank pain. Workup demonstrated right renal stone and UTI and she was transferred here for urologic evaluation.    She had right PCNL 2-4 years ago at [facility].    She has been AF and HDS since presentation. IV abx started in ER. Pain is notably improved today and overall she feels much better.

## 2018-09-03 NOTE — ASSESSMENT & PLAN NOTE
-- Large renal stone without obstruction  -- Explained that she will need procedure (likely PCNL) at later date to treat this stone, however emergent intervention not indicated at this time

## 2018-09-03 NOTE — CONSULTS
[facility]  Urology  Consult Note    Patient Name: Deb Figueroa  MRN: [id number]  Admission Date: 9/3/2018  Hospital Length of Stay: 0   Code Status: Full Code   Attending Provider: Angelika Santos MD   Consulting Provider: Tanner Brunson MD  Primary Care Physician: Primary Doctor No  Principal Problem:Sepsis secondary to UTI    Inpatient consult to Urology  Consult performed by: Tanner Brunson MD  Consult ordered by: Josh Reeves MD          Subjective:     HPI:  56 y/o female w/ h/o nephrolithiasis presented to outside ER yesterday with acute onset right flank pain. Workup demonstrated right renal stone and UTI and she was transferred here for urologic evaluation.    She had right PCNL 2-4 years ago at [facility].    She has been AF and HDS since presentation. IV abx started in ER. Pain is notably improved today and overall she feels much better.    Past Medical History:   Diagnosis Date    Hypertension     MS (multiple sclerosis)     Nephrolithiasis        Past Surgical History:   Procedure Laterality Date    LITHOTRIPSY  2016    Large renal stone.       Review of patient's allergies indicates:   Allergen Reactions    Vancomycin analogues Itching       Family History     Problem Relation (Age of Onset)    Cancer Father    Hypertension Mother          Tobacco Use    Smoking status: Never Smoker    Smokeless tobacco: Never Used   Substance and Sexual Activity    Alcohol use: No    Drug use: No    Sexual activity: Yes     Partners: Male     Comment:        Review of Systems   Constitutional: Negative for appetite change, chills and fever.   HENT: Negative for hearing loss.    Eyes: Negative for photophobia and pain.   Respiratory: Negative for cough and shortness of breath.    Cardiovascular: Negative for chest pain and palpitations.   Gastrointestinal: Negative for abdominal pain, diarrhea, nausea and vomiting.   Genitourinary: Positive for flank pain. Negative for  dysuria, frequency and urgency.   Musculoskeletal: Negative for arthralgias, myalgias and neck pain.   Skin: Negative for color change and rash.   Neurological: Negative for dizziness, weakness, numbness and headaches.   Psychiatric/Behavioral: Negative for agitation and confusion.       Objective:     Temp:  [98.2 °F (36.8 °C)-100 °F (37.8 °C)] 100 °F (37.8 °C)  Pulse:  [] 112  Resp:  [16-18] 18  SpO2:  [90 %-97 %] 90 %  BP: (116-155)/(77-94) 128/77     Body mass index is 31.3 kg/m².            Drains          None          Physical Exam   Constitutional: She is oriented to person, place, and time. She appears well-developed and well-nourished. No distress.   HENT:   Head: Normocephalic and atraumatic.   Eyes: Conjunctivae and EOM are normal. Pupils are equal, round, and reactive to light. Right eye exhibits no discharge. Left eye exhibits no discharge.   Neck: Normal range of motion. Neck supple. No tracheal deviation present. No thyromegaly present.   Cardiovascular: Normal rate, regular rhythm and intact distal pulses.  PMI is not displaced.    Pulmonary/Chest: Effort normal. No respiratory distress. She exhibits no tenderness.   Abdominal: Soft. She exhibits no distension. There is no hepatosplenomegaly. There is no tenderness. There is no CVA tenderness.   Genitourinary:   Genitourinary Comments: + right CVAT   Musculoskeletal: Normal range of motion. She exhibits no edema.   Neurological: She is alert and oriented to person, place, and time. No sensory deficit. Gait normal.   Skin: Skin is warm and dry. No rash noted. She is not diaphoretic. No cyanosis or erythema. Nails show no clubbing.     Psychiatric: She has a normal mood and affect. Her behavior is normal. Judgment and thought content normal. Her mood appears not anxious. She does not exhibit a depressed mood.       Significant Labs:    BMP:  Recent Labs   Lab  09/03/18   0528   NA  137   K  3.4*   CL  103   CO2  26   BUN  13   CREATININE  0.9    CALCIUM  9.4       CBC:  Recent Labs   Lab  09/03/18   0528   WBC  11.04   HGB  12.0   HCT  37.3   PLT  252     Outside labs from 9/2/18:  WBC 16.6  H/h 14.1/42.6  plt 316     Na 139  k 3.2  Cl 100  co2 25  Bun 12  Cr 0.9  glc 107    Significant Imaging:  CT Abd/Pelvis personally reviewed. Air in bladder and right collecting system. No air in right parenchyma. Approx 3cm stone in right renal pelvis without evidence of significant obstruction (? Mild hydro). Small lower pole stone. Small left renal stone.                    Assessment and Plan:     Emphysematous pyelitis    -- Clinically much improved with IV abx  -- Will defer stent/PCN for now due to lack of significant obstruction on CT and improved clinical picture  -- OK to resume diet today  -- NPO after midnight tonight        Right nephrolithiasis    -- Large renal stone without obstruction  -- Explained that she will need procedure (likely PCNL) at later date to treat this stone, however emergent intervention not indicated at this time            VTE Risk Mitigation (From admission, onward)        Ordered     Place sequential compression device  Until discontinued      09/03/18 0147     Place ANEL hose  Until discontinued      09/03/18 0147     IP VTE HIGH RISK PATIENT  Once      09/03/18 0147          Thank you for your consult. I will follow-up with patient. Please contact us if you have any additional questions.    Tanner Brunson MD  Urology  [facility]

## 2018-09-03 NOTE — PLAN OF CARE
Problem: Urinary Tract Infection (Adult)  Intervention: Monitor/Manage Pain   09/03/18 1218   Safety Interventions   Medication Review/Management medications reviewed   Pain/Comfort/Sleep Interventions   Pain Management Interventions medication offered but refused       Goal: Signs and Symptoms of Listed Potential Problems Will be Absent, Minimized or Managed (Urinary Tract Infection)  Signs and symptoms of listed potential problems will be absent, minimized or managed by discharge/transition of care (reference Urinary Tract Infection (Adult) CPG).   09/03/18 1218   Urinary Tract Infection   Problems Assessed (Urinary Tract Infection) fluid and electrolyte imbalance;infection progression;pain;all   Problems Present (Urinary Tract Infection) none       Problem: Patient Care Overview  Goal: Plan of Care Review  Outcome: Ongoing (interventions implemented as appropriate)   09/03/18 1218   Coping/Psychosocial   Plan Of Care Reviewed With patient;spouse

## 2018-09-03 NOTE — ASSESSMENT & PLAN NOTE
-- Clinically much improved with IV abx  -- Will defer stent/PCN for now due to lack of significant obstruction on CT and improved clinical picture  -- OK to resume diet today  -- NPO after midnight tonight

## 2018-09-03 NOTE — NURSING TRANSFER
Nursing Transfer Note      9/3/2018     Transfer To: [address] from [facility]    Transfer via stretcher    Transfer with cardiac monitoring    Transported by [facility] ambulance service    Medicines sent: n/a    Chart send with patient: Yes    Notified: spouse    Patient reassessed at: 09/03/2018 @ 0144    Upon arrival to floor: cardiac monitor applied, patient oriented to room, call bell in reach and bed in lowest position

## 2018-09-03 NOTE — SUBJECTIVE & OBJECTIVE
Past Medical History:   Diagnosis Date    Hypertension     MS (multiple sclerosis)     Nephrolithiasis        Past Surgical History:   Procedure Laterality Date    LITHOTRIPSY  2016    Large renal stone.       Review of patient's allergies indicates:   Allergen Reactions    Vancomycin analogues Itching       Family History     Problem Relation (Age of Onset)    Cancer Father    Hypertension Mother          Tobacco Use    Smoking status: Never Smoker    Smokeless tobacco: Never Used   Substance and Sexual Activity    Alcohol use: No    Drug use: No    Sexual activity: Yes     Partners: Male     Comment:        Review of Systems   Constitutional: Negative for appetite change, chills and fever.   HENT: Negative for hearing loss.    Eyes: Negative for photophobia and pain.   Respiratory: Negative for cough and shortness of breath.    Cardiovascular: Negative for chest pain and palpitations.   Gastrointestinal: Negative for abdominal pain, diarrhea, nausea and vomiting.   Genitourinary: Positive for flank pain. Negative for dysuria, frequency and urgency.   Musculoskeletal: Negative for arthralgias, myalgias and neck pain.   Skin: Negative for color change and rash.   Neurological: Negative for dizziness, weakness, numbness and headaches.   Psychiatric/Behavioral: Negative for agitation and confusion.       Objective:     Temp:  [98.2 °F (36.8 °C)-100 °F (37.8 °C)] 100 °F (37.8 °C)  Pulse:  [] 112  Resp:  [16-18] 18  SpO2:  [90 %-97 %] 90 %  BP: (116-155)/(77-94) 128/77     Body mass index is 31.3 kg/m².            Drains          None          Physical Exam   Constitutional: She is oriented to person, place, and time. She appears well-developed and well-nourished. No distress.   HENT:   Head: Normocephalic and atraumatic.   Eyes: Conjunctivae and EOM are normal. Pupils are equal, round, and reactive to light. Right eye exhibits no discharge. Left eye exhibits no discharge.   Neck: Normal range of  motion. Neck supple. No tracheal deviation present. No thyromegaly present.   Cardiovascular: Normal rate, regular rhythm and intact distal pulses.  PMI is not displaced.    Pulmonary/Chest: Effort normal. No respiratory distress. She exhibits no tenderness.   Abdominal: Soft. She exhibits no distension. There is no hepatosplenomegaly. There is no tenderness. There is no CVA tenderness.   Genitourinary:   Genitourinary Comments: + right CVAT   Musculoskeletal: Normal range of motion. She exhibits no edema.   Neurological: She is alert and oriented to person, place, and time. No sensory deficit. Gait normal.   Skin: Skin is warm and dry. No rash noted. She is not diaphoretic. No cyanosis or erythema. Nails show no clubbing.     Psychiatric: She has a normal mood and affect. Her behavior is normal. Judgment and thought content normal. Her mood appears not anxious. She does not exhibit a depressed mood.       Significant Labs:    BMP:  Recent Labs   Lab  09/03/18   0528   NA  137   K  3.4*   CL  103   CO2  26   BUN  13   CREATININE  0.9   CALCIUM  9.4       CBC:  Recent Labs   Lab  09/03/18   0528   WBC  11.04   HGB  12.0   HCT  37.3   PLT  252     Outside labs from 9/2/18:  WBC 16.6  H/h 14.1/42.6  plt 316     Na 139  k 3.2  Cl 100  co2 25  Bun 12  Cr 0.9  glc 107    Significant Imaging:  CT Abd/Pelvis personally reviewed. Air in bladder and right collecting system. No air in right parenchyma. Approx 3cm stone in right renal pelvis without evidence of significant obstruction (? Mild hydro). Small lower pole stone. Small left renal stone.

## 2018-09-03 NOTE — CARE UPDATE
Ms. Figueroa is a 58 yo woman who presented from [facility] for UTI associated with a kidney stone measuring ~3mm in diameter. Transferred for urology services. She was empirically started on Zosyn. Will need to follow up UCx at OSH.   I agree with Dr. Reeves's assessment and plan. I will be the hospitalist covering for the day and then Dr. Chinchilla will assume care on 9/4/2018.

## 2018-09-03 NOTE — PLAN OF CARE
Discharge planning assessment completed with patient's assistance.  Patient from home with spouse and needs assistance with mobilty yet does not need assistance with bathing, dressing, or feeding hersel.f  Patient has a walker and uses a wheelchair at this time.  Patient is requesting a marco antonio lift for home use at this time.  Patient's ouse is the primary caregiver but does work outside of the home.  Patient's preferred time for appointments is afternoon and preferred pharmacy is [facility].  Patient's primary care provider is Dr. VANCE Thrasher.    Discharge disposition:  Home with Family;  Requesting a marco antonio lift for home use.       09/03/18 8505   Discharge Assessment   Assessment Type Discharge Planning Assessment   Confirmed/corrected address and phone number on facesheet? Yes   Assessment information obtained from? Patient  (spouse at the bedside)   Communicated expected length of stay with patient/caregiver no   Prior to hospitilization cognitive status: Alert/Oriented   Prior to hospitalization functional status: Assistive Equipment;Needs Assistance   Current cognitive status: Alert/Oriented   Current Functional Status: Assistive Equipment;Needs Assistance   Facility Arrived From: home   Lives With spouse   Is patient able to care for self after discharge? Yes  (with assistance)   Who are your caregiver(s) and their phone number(s)? Jayesh Figueroa; [phone]   Patient's perception of discharge disposition home or selfcare   Readmission Within The Last 30 Days no previous admission in last 30 days   Patient currently being followed by outpatient case management? No   Patient currently receives any other outside agency services? No   Equipment Currently Used at Home walker, rolling;wheelchair   Do you have any problems affording any of your prescribed medications? No   Is the patient taking medications as prescribed? yes   Does the patient have transportation home? Yes   Transportation Available family or  friend will provide   Dialysis Name and Scheduled days n/a   Does the patient receive services at the [facility]? No   Discharge Plan A Home with family   Patient/Family In Agreement With Plan yes   Miguelina Tan LMSw, ACTYRA-Alan, CCM  9/3/2018

## 2018-09-04 PROBLEM — E87.6 HYPOKALEMIA: Status: ACTIVE | Noted: 2018-09-04

## 2018-09-04 LAB
ALBUMIN SERPL BCP-MCNC: 3.2 G/DL
ALBUMIN SERPL BCP-MCNC: 3.3 G/DL
ALP SERPL-CCNC: 86 U/L
ALP SERPL-CCNC: 91 U/L
ALT SERPL W/O P-5'-P-CCNC: 11 U/L
ALT SERPL W/O P-5'-P-CCNC: 11 U/L
ANION GAP SERPL CALC-SCNC: 10 MMOL/L
ANION GAP SERPL CALC-SCNC: 8 MMOL/L
AST SERPL-CCNC: 14 U/L
AST SERPL-CCNC: 15 U/L
BASOPHILS # BLD AUTO: 0.02 K/UL
BASOPHILS # BLD AUTO: 0.02 K/UL
BASOPHILS NFR BLD: 0.2 %
BASOPHILS NFR BLD: 0.3 %
BILIRUB SERPL-MCNC: 0.4 MG/DL
BILIRUB SERPL-MCNC: 0.6 MG/DL
BUN SERPL-MCNC: 11 MG/DL
BUN SERPL-MCNC: 13 MG/DL
CALCIUM SERPL-MCNC: 9.6 MG/DL
CALCIUM SERPL-MCNC: 9.9 MG/DL
CHLORIDE SERPL-SCNC: 103 MMOL/L
CHLORIDE SERPL-SCNC: 105 MMOL/L
CO2 SERPL-SCNC: 26 MMOL/L
CO2 SERPL-SCNC: 27 MMOL/L
CREAT SERPL-MCNC: 0.9 MG/DL
CREAT SERPL-MCNC: 0.9 MG/DL
DIFFERENTIAL METHOD: ABNORMAL
DIFFERENTIAL METHOD: ABNORMAL
EOSINOPHIL # BLD AUTO: 0.1 K/UL
EOSINOPHIL # BLD AUTO: 0.1 K/UL
EOSINOPHIL NFR BLD: 0.6 %
EOSINOPHIL NFR BLD: 0.9 %
ERYTHROCYTE [DISTWIDTH] IN BLOOD BY AUTOMATED COUNT: 14.4 %
ERYTHROCYTE [DISTWIDTH] IN BLOOD BY AUTOMATED COUNT: 14.4 %
EST. GFR  (AFRICAN AMERICAN): >60 ML/MIN/1.73 M^2
EST. GFR  (AFRICAN AMERICAN): >60 ML/MIN/1.73 M^2
EST. GFR  (NON AFRICAN AMERICAN): >60 ML/MIN/1.73 M^2
EST. GFR  (NON AFRICAN AMERICAN): >60 ML/MIN/1.73 M^2
GLUCOSE SERPL-MCNC: 114 MG/DL
GLUCOSE SERPL-MCNC: 128 MG/DL
HCT VFR BLD AUTO: 37 %
HCT VFR BLD AUTO: 37 %
HGB BLD-MCNC: 12 G/DL
HGB BLD-MCNC: 12.3 G/DL
LYMPHOCYTES # BLD AUTO: 1.6 K/UL
LYMPHOCYTES # BLD AUTO: 1.8 K/UL
LYMPHOCYTES NFR BLD: 16.2 %
LYMPHOCYTES NFR BLD: 22.9 %
MAGNESIUM SERPL-MCNC: 2.3 MG/DL
MCH RBC QN AUTO: 26 PG
MCH RBC QN AUTO: 26.6 PG
MCHC RBC AUTO-ENTMCNC: 32.4 G/DL
MCHC RBC AUTO-ENTMCNC: 33.2 G/DL
MCV RBC AUTO: 80 FL
MCV RBC AUTO: 80 FL
MONOCYTES # BLD AUTO: 0.7 K/UL
MONOCYTES # BLD AUTO: 1.1 K/UL
MONOCYTES NFR BLD: 11.2 %
MONOCYTES NFR BLD: 8.5 %
NEUTROPHILS # BLD AUTO: 5.4 K/UL
NEUTROPHILS # BLD AUTO: 7.1 K/UL
NEUTROPHILS NFR BLD: 67.3 %
NEUTROPHILS NFR BLD: 71.5 %
PHOSPHATE SERPL-MCNC: 3.2 MG/DL
PLATELET # BLD AUTO: 272 K/UL
PLATELET # BLD AUTO: 272 K/UL
PMV BLD AUTO: 10.3 FL
PMV BLD AUTO: 10.7 FL
POTASSIUM SERPL-SCNC: 3.1 MMOL/L
POTASSIUM SERPL-SCNC: 3.4 MMOL/L
PROT SERPL-MCNC: 7.6 G/DL
PROT SERPL-MCNC: 7.8 G/DL
RBC # BLD AUTO: 4.61 M/UL
RBC # BLD AUTO: 4.63 M/UL
SODIUM SERPL-SCNC: 138 MMOL/L
SODIUM SERPL-SCNC: 141 MMOL/L
WBC # BLD AUTO: 7.99 K/UL
WBC # BLD AUTO: 9.88 K/UL

## 2018-09-04 PROCEDURE — 63600175 PHARM REV CODE 636 W HCPCS: Performed by: HOSPITALIST

## 2018-09-04 PROCEDURE — 80053 COMPREHEN METABOLIC PANEL: CPT

## 2018-09-04 PROCEDURE — 25000003 PHARM REV CODE 250: Performed by: HOSPITALIST

## 2018-09-04 PROCEDURE — 83735 ASSAY OF MAGNESIUM: CPT

## 2018-09-04 PROCEDURE — A4216 STERILE WATER/SALINE, 10 ML: HCPCS | Performed by: HOSPITALIST

## 2018-09-04 PROCEDURE — 21400001 HC TELEMETRY ROOM

## 2018-09-04 PROCEDURE — 84100 ASSAY OF PHOSPHORUS: CPT

## 2018-09-04 PROCEDURE — 80053 COMPREHEN METABOLIC PANEL: CPT | Mod: 91

## 2018-09-04 PROCEDURE — 36415 COLL VENOUS BLD VENIPUNCTURE: CPT

## 2018-09-04 PROCEDURE — 99232 SBSQ HOSP IP/OBS MODERATE 35: CPT | Mod: ,,, | Performed by: UROLOGY

## 2018-09-04 PROCEDURE — 85025 COMPLETE CBC W/AUTO DIFF WBC: CPT | Mod: 91

## 2018-09-04 PROCEDURE — 94761 N-INVAS EAR/PLS OXIMETRY MLT: CPT

## 2018-09-04 RX ORDER — POTASSIUM CHLORIDE 20 MEQ/1
40 TABLET, EXTENDED RELEASE ORAL ONCE
Status: COMPLETED | OUTPATIENT
Start: 2018-09-04 | End: 2018-09-04

## 2018-09-04 RX ORDER — AMLODIPINE BESYLATE 5 MG/1
5 TABLET ORAL DAILY
Status: DISCONTINUED | OUTPATIENT
Start: 2018-09-05 | End: 2018-09-05 | Stop reason: HOSPADM

## 2018-09-04 RX ORDER — LORAZEPAM 0.5 MG/1
2 TABLET ORAL 3 TIMES DAILY PRN
Status: DISCONTINUED | OUTPATIENT
Start: 2018-09-04 | End: 2018-09-05 | Stop reason: HOSPADM

## 2018-09-04 RX ORDER — ENOXAPARIN SODIUM 100 MG/ML
40 INJECTION SUBCUTANEOUS EVERY 24 HOURS
Status: DISCONTINUED | OUTPATIENT
Start: 2018-09-04 | End: 2018-09-05 | Stop reason: HOSPADM

## 2018-09-04 RX ORDER — ARIPIPRAZOLE 5 MG/1
5 TABLET ORAL DAILY
Status: DISCONTINUED | OUTPATIENT
Start: 2018-09-04 | End: 2018-09-05 | Stop reason: HOSPADM

## 2018-09-04 RX ORDER — ESCITALOPRAM OXALATE 10 MG/1
20 TABLET ORAL NIGHTLY
Status: DISCONTINUED | OUTPATIENT
Start: 2018-09-04 | End: 2018-09-05 | Stop reason: HOSPADM

## 2018-09-04 RX ORDER — DEXTROSE, SODIUM CHLORIDE, SODIUM LACTATE, POTASSIUM CHLORIDE, AND CALCIUM CHLORIDE 5; .6; .31; .03; .02 G/100ML; G/100ML; G/100ML; G/100ML; G/100ML
INJECTION, SOLUTION INTRAVENOUS CONTINUOUS
Status: DISCONTINUED | OUTPATIENT
Start: 2018-09-04 | End: 2018-09-04

## 2018-09-04 RX ORDER — OXCARBAZEPINE 150 MG/1
300 TABLET, FILM COATED ORAL 2 TIMES DAILY
Status: DISCONTINUED | OUTPATIENT
Start: 2018-09-04 | End: 2018-09-05 | Stop reason: HOSPADM

## 2018-09-04 RX ADMIN — POTASSIUM CHLORIDE 40 MEQ: 1500 TABLET, EXTENDED RELEASE ORAL at 01:09

## 2018-09-04 RX ADMIN — Medication 3 ML: at 02:09

## 2018-09-04 RX ADMIN — OXCARBAZEPINE 300 MG: 150 TABLET ORAL at 08:09

## 2018-09-04 RX ADMIN — DOCUSATE SODIUM AND SENNOSIDES 1 TABLET: 8.6; 5 TABLET, FILM COATED ORAL at 08:09

## 2018-09-04 RX ADMIN — CEFTRIAXONE 1 G: 1 INJECTION, SOLUTION INTRAVENOUS at 01:09

## 2018-09-04 RX ADMIN — Medication 3 ML: at 06:09

## 2018-09-04 RX ADMIN — DEXTROSE, SODIUM CHLORIDE, SODIUM LACTATE, POTASSIUM CHLORIDE, AND CALCIUM CHLORIDE: 5; .6; .31; .03; .02 INJECTION, SOLUTION INTRAVENOUS at 10:09

## 2018-09-04 RX ADMIN — ARIPIPRAZOLE 5 MG: 5 TABLET ORAL at 01:09

## 2018-09-04 RX ADMIN — ESCITALOPRAM OXALATE 20 MG: 10 TABLET ORAL at 08:09

## 2018-09-04 RX ADMIN — Medication 3 ML: at 10:09

## 2018-09-04 RX ADMIN — PIPERACILLIN AND TAZOBACTAM 4.5 G: 4; .5 INJECTION, POWDER, LYOPHILIZED, FOR SOLUTION INTRAVENOUS; PARENTERAL at 06:09

## 2018-09-04 NOTE — ASSESSMENT & PLAN NOTE
-- Clinically much improved with IV abx  -- Will defer stent/PCN for now due to lack of significant obstruction on CT and improved clinical picture  -- OK to resume diet today

## 2018-09-04 NOTE — ASSESSMENT & PLAN NOTE
Patient transferred from outside hospital with UTI and kidney stone for Urology evaluation.  E coli UTI.  Change ABx's to Rocephin.  Afebrile with normal WBC.  Improving tachycardia.  Appreciate Urology evaluation.  No evidence of obstruction and no need for urgent intervention.  Probably home tomorrow on oral ABx's.

## 2018-09-04 NOTE — MEDICAL/APP STUDENT
[facility]  History & Physical    Subjective:      Chief Complaint/Reason for Admission: ***    Deb Figueroa is a 57 y.o. female with HTN, MS ans a PMHx of Nephrolithiasis who presented from an OSH for right flank pain. The patient describes the pain as a non-radiating 6/10 intermittent dull pain that has progressively worsened over the past two months. She denies fever, chills, N/V, diarrhea, constipation, abdominal pain, dysuria, urgency, and hematuria. The patient endorses several similar issue in the past for which she was diagnosed with a large right-sided nephrolithiasis.    Past Medical History:   Diagnosis Date    Hypertension     MS (multiple sclerosis)     Nephrolithiasis      Past Surgical History:   Procedure Laterality Date    LITHOTRIPSY  2016    Large renal stone.     Family History   Problem Relation Age of Onset    Cancer Father         prostate    Hypertension Mother     Breast cancer Neg Hx     Ovarian cancer Neg Hx     Colon cancer Neg Hx      Social History     Tobacco Use    Smoking status: Never Smoker    Smokeless tobacco: Never Used   Substance Use Topics    Alcohol use: No    Drug use: No       PTA Medications   Medication Sig    amlodipine (NORVASC) 5 MG tablet Take 5 mg by mouth once daily.    ARIPiprazole (ABILIFY) 5 MG Tab Take 1 tablet (5 mg total) by mouth once daily.    escitalopram oxalate (LEXAPRO) 10 MG tablet Take 10 mg by mouth once daily.     escitalopram oxalate (LEXAPRO) 20 MG tablet TAKE 1 TABLET (20 MG TOTAL) BY MOUTH EVERY EVENING.    lorazepam (ATIVAN) 2 MG Tab Take 1 tablet (2 mg total) by mouth 3 (three) times daily as needed. (Patient taking differently: Take 2 mg by mouth 3 (three) times daily as needed. )    mirabegron (MYRBETRIQ) 25 mg Tb24 ER tablet Take 1 tablet (25 mg total) by mouth once daily.    OXcarbazepine (TRILEPTAL) 300 MG Tab TAKE 1 TABLET (300 MG TOTAL) BY MOUTH 2 (TWO) TIMES DAILY.    rosuvastatin (CRESTOR) 10 MG  tablet Take 10 mg by mouth every evening.     varicella-zoster gE-AS01B, PF, (SHINGRIX, PF,) 50 mcg/0.5 mL injection Inject 0.5 mLs into the muscle every 6 (six) months. for 2 doses     Review of patient's allergies indicates:   Allergen Reactions    Vancomycin analogues Itching        Review of Systems   Constitutional: Negative for chills, fever and malaise/fatigue.   Respiratory: Negative for cough and shortness of breath.    Cardiovascular: Negative for chest pain and palpitations.   Gastrointestinal: Negative for abdominal pain, constipation, diarrhea, nausea and vomiting.   Genitourinary: Positive for flank pain. Negative for dysuria, frequency, hematuria and urgency.        Right sided flank pain    Musculoskeletal: Negative for joint pain and myalgias.   Skin: Negative for itching and rash.   Neurological: Negative for dizziness and headaches.       Objective:      Vital Signs (Most Recent)  Temp: 97.2 °F (36.2 °C) (09/04/18 0807)  Pulse: 99 (09/04/18 0807)  Resp: 18 (09/04/18 0807)  BP: 135/70 (09/04/18 0807)  SpO2: (!) 91 % (09/04/18 0807)    Vital Signs Range (Last 24H):  Temp:  [97.1 °F (36.2 °C)-100 °F (37.8 °C)]   Pulse:  []   Resp:  [18-20]   BP: (127-148)/(70-84)   SpO2:  [90 %-95 %]     Physical Exam   Constitutional: She is oriented to person, place, and time. She appears well-developed and well-nourished. No distress.   HENT:   Head: Normocephalic and atraumatic.   Eyes: EOM are normal. Pupils are equal, round, and reactive to light.   Neck: Normal range of motion. Neck supple.   Cardiovascular: Normal rate, regular rhythm and normal heart sounds.   No murmur heard.  Pulmonary/Chest: Effort normal and breath sounds normal. No respiratory distress. She has no wheezes.   Abdominal: Soft. Bowel sounds are normal. She exhibits no distension. There is no tenderness.   Genitourinary:   Genitourinary Comments: Right sided CVA tenderness    Musculoskeletal: She exhibits tenderness. She exhibits no  edema.   Neurological: She is alert and oriented to person, place, and time.   Skin: Skin is warm and dry.         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       Plan:      Right Nephrolithiasis:  -workup from OSH  -Urology consult:       -Nephrolithiasis without obstruction.        -Emergent intervention not indicated       -Tx with future Percutaneous Nephrolithotomy        -follow up with urology in 1 week    UTI:   -Workup from OSH   -IV antibiotic: piperacillin-tazobactam   -Urology consult:       -Emphysematous pyelitis: defer stent/PCN         (lack of significant obstruction on CT and clinically improved picture)

## 2018-09-04 NOTE — SUBJECTIVE & OBJECTIVE
Interval History: She is feeling well other than not sleeping well overnight. No flank pain. No fevers. No dysuria.     Review of Systems   Constitutional: Positive for fatigue. Negative for appetite change, chills and fever.   HENT: Negative for congestion, sore throat and trouble swallowing.    Eyes: Negative for pain and itching.   Respiratory: Negative for cough and shortness of breath.    Cardiovascular: Negative for chest pain, palpitations and leg swelling.   Gastrointestinal: Negative for abdominal distention, abdominal pain, constipation, diarrhea, nausea and vomiting.   Genitourinary: Negative for difficulty urinating, dysuria, flank pain, hematuria, nocturia and urgency.   Musculoskeletal: Negative for back pain, neck pain and neck stiffness.   Skin: Negative for rash and wound.   Neurological: Negative for dizziness and seizures.   Hematological: Negative for adenopathy. Does not bruise/bleed easily.   Psychiatric/Behavioral: Negative for confusion. The patient is not nervous/anxious.    All other systems reviewed and are negative.    Objective:     Temp:  [97.1 °F (36.2 °C)-100 °F (37.8 °C)] 97.2 °F (36.2 °C)  Pulse:  [] 99  Resp:  [18-20] 18  SpO2:  [90 %-95 %] 91 %  BP: (127-148)/(70-84) 135/70     Body mass index is 31.04 kg/m².            Drains          None          Physical Exam   Vitals reviewed.  Constitutional: She is oriented to person, place, and time. She appears well-developed and well-nourished. No distress.   HENT:   Head: Normocephalic and atraumatic.   Neck: Normal range of motion.   Cardiovascular: Normal rate and regular rhythm.    Pulmonary/Chest: Effort normal and breath sounds normal. No respiratory distress.   Abdominal: Soft. She exhibits no distension and no mass. There is no tenderness. There is no rebound and no guarding.   Musculoskeletal: Normal range of motion.   Neurological: She is alert and oriented to person, place, and time.   Skin: Skin is warm and dry. No rash  noted. She is not diaphoretic. No erythema.     Psychiatric: She has a normal mood and affect. Her behavior is normal.       Significant Labs:    BMP:  Recent Labs   Lab  09/03/18 0528  09/04/18 0527   NA  137  138   K  3.4*  3.1*   CL  103  103   CO2  26  27   BUN  13  11   CREATININE  0.9  0.9   CALCIUM  9.4  9.6       CBC:   Recent Labs   Lab  09/03/18 0528 09/04/18 0527   WBC  11.04  9.88   HGB  12.0  12.0   HCT  37.3  37.0   PLT  252  272       Blood Culture: No results for input(s): LABBLOO in the last 168 hours.  Urine Culture: No results for input(s): LABURIN in the last 168 hours.  Urine Studies:   Recent Labs   Lab  09/03/18   1756   COLORU  Yellow   APPEARANCEUA  Clear   PHUR  7.0   SPECGRAV  1.020   PROTEINUA  Negative   GLUCUA  Negative   KETONESU  Negative   BILIRUBINUA  Negative   OCCULTUA  1+*   NITRITE  Negative   UROBILINOGEN  Negative   LEUKOCYTESUR  Trace*   RBCUA  3   WBCUA  25*   BACTERIA  Occasional   SQUAMEPITHEL  5       Significant Imaging:  All pertinent imaging results/findings from the past 24 hours have been reviewed.

## 2018-09-04 NOTE — PLAN OF CARE
Problem: Sepsis/Septic Shock (Adult)  Goal: Signs and Symptoms of Listed Potential Problems Will be Absent, Minimized or Managed (Sepsis/Septic Shock)  Signs and symptoms of listed potential problems will be absent, minimized or managed by discharge/transition of care (reference Sepsis/Septic Shock (Adult) CPG).  Outcome: Ongoing (interventions implemented as appropriate)   09/04/18 0548   Sepsis/Septic Shock   Problems Assessed (Sepsis) situational response   Problems Present (Sepsis) situational response       Problem: Pain, Acute (Adult)  Goal: Identify Related Risk Factors and Signs and Symptoms  Related risk factors and signs and symptoms are identified upon initiation of Human Response Clinical Practice Guideline (CPG)  Outcome: Ongoing (interventions implemented as appropriate)   09/04/18 0548   Pain, Acute   Related Risk Factors (Acute Pain) knowledge deficit;patient perception;infection;positioning;persistent pain   Signs and Symptoms (Acute Pain) facial mask of pain/grimace;fatigue/weakness;guarding/abnormal posturing/positioning     Goal: Acceptable Pain Control/Comfort Level  Patient will demonstrate the desired outcomes by discharge/transition of care.  Outcome: Ongoing (interventions implemented as appropriate)   09/04/18 0548   Pain, Acute (Adult)   Acceptable Pain Control/Comfort Level making progress toward outcome

## 2018-09-04 NOTE — PROGRESS NOTES
[facility] Hospital Medicine  Progress Note    Patient Name: Deb Figueroa  MRN: [id number]  Patient Class: IP- Inpatient   Admission Date: 9/3/2018  Length of Stay: 1 days  Attending Physician: Charles Chinchilla MD  Primary Care Provider: Primary Doctor No        Subjective:     Principal Problem:Sepsis secondary to UTI    HPI:  No notes on file    Hospital Course:  Ms. Figueroa is a 58 yo woman who presented from [facility] for UTI associated with a kidney stone measuring ~3mm in diameter. Transferred for urology services. She was empirically started on Zosyn.  Urology consulted.  No need for stent placement as no evidence of infection.  UCx growing E coli.        Interval History: Complaining of back pain.    Review of Systems   HENT: Negative for ear discharge and ear pain.    Eyes: Negative for pain and itching.   Endocrine: Negative for polyphagia and polyuria.   Neurological: Negative for seizures and syncope.     Objective:     Vital Signs (Most Recent):  Temp: 98.5 °F (36.9 °C) (09/04/18 1120)  Pulse: 92 (09/04/18 1120)  Resp: 18 (09/04/18 1120)  BP: 137/63 (09/04/18 1120)  SpO2: (!) 90 % (09/04/18 1120) Vital Signs (24h Range):  Temp:  [97.1 °F (36.2 °C)-98.5 °F (36.9 °C)] 98.5 °F (36.9 °C)  Pulse:  [91-99] 92  Resp:  [18-20] 18  SpO2:  [90 %-95 %] 90 %  BP: (127-137)/(63-84) 137/63     Weight: 72.1 kg (158 lb 15.2 oz)  Body mass index is 31.04 kg/m².    Intake/Output Summary (Last 24 hours) at 9/4/2018 1549  Last data filed at 9/4/2018 1300  Gross per 24 hour   Intake 720 ml   Output --   Net 720 ml      Physical Exam   Constitutional: She is oriented to person, place, and time. No distress.   HENT:   Head: Normocephalic and atraumatic.   Eyes: Conjunctivae are normal. Right eye exhibits no discharge. Left eye exhibits no discharge.   Neck: Neck supple.   Cardiovascular: Normal rate, regular rhythm and normal heart sounds.   Pulmonary/Chest: Effort normal and breath sounds normal.    Abdominal: Soft. Bowel sounds are normal.   Musculoskeletal: She exhibits no edema.   Neurological: She is alert and oriented to person, place, and time.   Skin: Skin is warm and dry. She is not diaphoretic.       Significant Labs:   BMP:   Recent Labs   Lab  09/04/18 0527   GLU  114*   NA  138   K  3.1*   CL  103   CO2  27   BUN  11   CREATININE  0.9   CALCIUM  9.6   MG  2.3     CBC:   Recent Labs   Lab  09/03/18 0528 09/04/18 0527   WBC  11.04  9.88   HGB  12.0  12.0   HCT  37.3  37.0   PLT  252  272         Assessment/Plan:      * Sepsis secondary to UTI    Patient transferred from outside hospital with UTI and kidney stone for Urology evaluation.  E coli UTI.  Change ABx's to Rocephin.  Afebrile with normal WBC.  Improving tachycardia.  Appreciate Urology evaluation.  No evidence of obstruction and no need for urgent intervention.  Probably home tomorrow on oral ABx's.          Right nephrolithiasis    As above.          Hypokalemia    Replace as needed.          Hyperlipidemia              Hypertension    Essential Hypertension.  Resume home Norvasc.          Multiple sclerosis    Will get PT/OT evaluation.            VTE Risk Mitigation (From admission, onward)        Ordered     enoxaparin injection 40 mg  Daily      09/04/18 1311     Place sequential compression device  Until discontinued      09/03/18 0147     Place ANEL hose  Until discontinued      09/03/18 0147     IP VTE HIGH RISK PATIENT  Once      09/03/18 0147              Charles Chinchilla MD  Department of Hospital Medicine   [facility]

## 2018-09-04 NOTE — NURSING
Report received by JEANNIE Montoya. The pt is lying in bed resting. Tele monitor in progress with alarms set. Safety precautions maintained and bed locked in lowest positron. Side rails up X2. Call bell and personal items within reach. Will continue to monitor pt for any changes.

## 2018-09-04 NOTE — PROGRESS NOTES
TN met with pt to discuss other support to help manage her care and request of a marco antonio lift. Pt stated she has her sister, Rubina Bellamy and mother, Paz Serrano. Both have the same number but did not know it by memory. Pt did not know what a marco antonio lift is and declined a request for it. PCP is Dr. Jesus Thrasher in the cut-off.    [id number] TN contacted Dr. Thrasher's office at [phone] to schedule a PCP f/u; spoke with Herminia; appt scheduled on 09/12/18 at 2:30 PM.

## 2018-09-04 NOTE — PROGRESS NOTES
Patient found on floor by primary nurse and PCT. Patient stated she got out of bed and sat on the floor to use the bathroom. Urine and feces on the floor. Patient assisted back to bed and cleaned up per primary nurse and PCT. Dr. Reeves notified. No new orders received.    Addendum: No injuries noted and patient does not complain of any injuries. [employer] camera placed in room.

## 2018-09-04 NOTE — HOSPITAL COURSE
Ms. Figueroa is a 56 yo woman who presented from [facility] for UTI associated with a kidney stone measuring ~3mm in diameter. Transferred for urology services. She was empirically started on Zosyn.  Urology consulted.  No need for stent placement as no evidence of obstruction.  E coli UTI.  Negative BCx.  Patient has remained afebrile and hemodynamically stable.  Currently asymptomatic.  She will be switched to PO Cipro for 10 more days.  She will follow up with Urology as she may need procedure at a later date to treat stone.  Patient with MS and already scheduled to see Neurologist this week.  She will be discharged home.

## 2018-09-04 NOTE — PLAN OF CARE
Problem: Fall Risk (Adult)  Intervention: Monitor/Assist with Self Care   18   Activity   Activity Assistance Provided assistance, 1 person   Daily Care Interventions   Self-Care Promotion BADL personal objects within reach   Functional Level Current   Ambulation 4 - completely dependent   Transferring 4 - completely dependent   Toileting 2 - assistive person   Bathing 2 - assistive person   Dressing 2 - assistive person   Eating 0 - independent   Communication 0 - understands/communicates without difficulty   Swallowing 0 - swallows foods/liquids without difficulty     Intervention: Reduce Risk/Promote Restraint Free Environment   18   Safety Interventions   Environmental Safety Modification assistive device/personal items within reach;clutter free environment maintained   Prevent  Drop/Fall   Safety/Security Measures bed alarm set       Goal: Identify Related Risk Factors and Signs and Symptoms  Related risk factors and signs and symptoms are identified upon initiation of Human Response Clinical Practice Guideline (CPG)  Outcome: Ongoing (interventions implemented as appropriate)   18   Fall Risk   Related Risk Factors (Fall Risk) gait/mobility problems;history of falls;environment unfamiliar   Signs and Symptoms (Fall Risk) presence of risk factors     Goal: Absence of Falls  Patient will demonstrate the desired outcomes by discharge/transition of care.  Outcome: Ongoing (interventions implemented as appropriate)   18   Fall Risk (Adult)   Absence of Falls making progress toward outcome

## 2018-09-04 NOTE — SUBJECTIVE & OBJECTIVE
Interval History: Complaining of back pain.    Review of Systems   HENT: Negative for ear discharge and ear pain.    Eyes: Negative for pain and itching.   Endocrine: Negative for polyphagia and polyuria.   Neurological: Negative for seizures and syncope.     Objective:     Vital Signs (Most Recent):  Temp: 98.5 °F (36.9 °C) (09/04/18 1120)  Pulse: 92 (09/04/18 1120)  Resp: 18 (09/04/18 1120)  BP: 137/63 (09/04/18 1120)  SpO2: (!) 90 % (09/04/18 1120) Vital Signs (24h Range):  Temp:  [97.1 °F (36.2 °C)-98.5 °F (36.9 °C)] 98.5 °F (36.9 °C)  Pulse:  [91-99] 92  Resp:  [18-20] 18  SpO2:  [90 %-95 %] 90 %  BP: (127-137)/(63-84) 137/63     Weight: 72.1 kg (158 lb 15.2 oz)  Body mass index is 31.04 kg/m².    Intake/Output Summary (Last 24 hours) at 9/4/2018 1549  Last data filed at 9/4/2018 1300  Gross per 24 hour   Intake 720 ml   Output --   Net 720 ml      Physical Exam   Constitutional: She is oriented to person, place, and time. No distress.   HENT:   Head: Normocephalic and atraumatic.   Eyes: Conjunctivae are normal. Right eye exhibits no discharge. Left eye exhibits no discharge.   Neck: Neck supple.   Cardiovascular: Normal rate, regular rhythm and normal heart sounds.   Pulmonary/Chest: Effort normal and breath sounds normal.   Abdominal: Soft. Bowel sounds are normal.   Musculoskeletal: She exhibits no edema.   Neurological: She is alert and oriented to person, place, and time.   Skin: Skin is warm and dry. She is not diaphoretic.       Significant Labs:   BMP:   Recent Labs   Lab  09/04/18   0527   GLU  114*   NA  138   K  3.1*   CL  103   CO2  27   BUN  11   CREATININE  0.9   CALCIUM  9.6   MG  2.3     CBC:   Recent Labs   Lab  09/03/18   0528  09/04/18   0527   WBC  11.04  9.88   HGB  12.0  12.0   HCT  37.3  37.0   PLT  252  272

## 2018-09-05 VITALS
TEMPERATURE: 98 F | WEIGHT: 158.94 LBS | OXYGEN SATURATION: 92 % | SYSTOLIC BLOOD PRESSURE: 165 MMHG | RESPIRATION RATE: 20 BRPM | BODY MASS INDEX: 31.2 KG/M2 | DIASTOLIC BLOOD PRESSURE: 98 MMHG | HEIGHT: 60 IN | HEART RATE: 91 BPM

## 2018-09-05 PROBLEM — A41.9 SEPSIS DUE TO URINARY TRACT INFECTION: Status: RESOLVED | Noted: 2018-09-03 | Resolved: 2018-09-05

## 2018-09-05 PROBLEM — E87.6 HYPOKALEMIA: Status: RESOLVED | Noted: 2018-09-04 | Resolved: 2018-09-05

## 2018-09-05 PROBLEM — N39.0 SEPSIS DUE TO URINARY TRACT INFECTION: Status: RESOLVED | Noted: 2018-09-03 | Resolved: 2018-09-05

## 2018-09-05 LAB
ALBUMIN SERPL BCP-MCNC: 3.4 G/DL
ALP SERPL-CCNC: 92 U/L
ALT SERPL W/O P-5'-P-CCNC: 8 U/L
ANION GAP SERPL CALC-SCNC: 14 MMOL/L
AST SERPL-CCNC: 16 U/L
BACTERIA UR CULT: NO GROWTH
BASOPHILS # BLD AUTO: 0.01 K/UL
BASOPHILS NFR BLD: 0.1 %
BILIRUB SERPL-MCNC: 0.4 MG/DL
BUN SERPL-MCNC: 16 MG/DL
CALCIUM SERPL-MCNC: 10.3 MG/DL
CHLORIDE SERPL-SCNC: 105 MMOL/L
CO2 SERPL-SCNC: 23 MMOL/L
CREAT SERPL-MCNC: 0.9 MG/DL
DIFFERENTIAL METHOD: ABNORMAL
EOSINOPHIL # BLD AUTO: 0.1 K/UL
EOSINOPHIL NFR BLD: 1.5 %
ERYTHROCYTE [DISTWIDTH] IN BLOOD BY AUTOMATED COUNT: 14.5 %
EST. GFR  (AFRICAN AMERICAN): >60 ML/MIN/1.73 M^2
EST. GFR  (NON AFRICAN AMERICAN): >60 ML/MIN/1.73 M^2
GLUCOSE SERPL-MCNC: 80 MG/DL
HCT VFR BLD AUTO: 37.5 %
HGB BLD-MCNC: 12.2 G/DL
LYMPHOCYTES # BLD AUTO: 2.2 K/UL
LYMPHOCYTES NFR BLD: 25.3 %
MAGNESIUM SERPL-MCNC: 2.7 MG/DL
MCH RBC QN AUTO: 26 PG
MCHC RBC AUTO-ENTMCNC: 32.5 G/DL
MCV RBC AUTO: 80 FL
MONOCYTES # BLD AUTO: 0.8 K/UL
MONOCYTES NFR BLD: 9.2 %
NEUTROPHILS # BLD AUTO: 5.6 K/UL
NEUTROPHILS NFR BLD: 63.7 %
PHOSPHATE SERPL-MCNC: 2.9 MG/DL
PLATELET # BLD AUTO: 318 K/UL
PMV BLD AUTO: 11.3 FL
POTASSIUM SERPL-SCNC: 3.6 MMOL/L
PROT SERPL-MCNC: 8.3 G/DL
RBC # BLD AUTO: 4.7 M/UL
SODIUM SERPL-SCNC: 142 MMOL/L
WBC # BLD AUTO: 8.84 K/UL

## 2018-09-05 PROCEDURE — G8987 SELF CARE CURRENT STATUS: HCPCS | Mod: CJ

## 2018-09-05 PROCEDURE — 97535 SELF CARE MNGMENT TRAINING: CPT

## 2018-09-05 PROCEDURE — 85025 COMPLETE CBC W/AUTO DIFF WBC: CPT

## 2018-09-05 PROCEDURE — G8988 SELF CARE GOAL STATUS: HCPCS | Mod: CI

## 2018-09-05 PROCEDURE — 97165 OT EVAL LOW COMPLEX 30 MIN: CPT

## 2018-09-05 PROCEDURE — G8989 SELF CARE D/C STATUS: HCPCS | Mod: CJ

## 2018-09-05 PROCEDURE — 84100 ASSAY OF PHOSPHORUS: CPT

## 2018-09-05 PROCEDURE — G8978 MOBILITY CURRENT STATUS: HCPCS | Mod: CK

## 2018-09-05 PROCEDURE — 97161 PT EVAL LOW COMPLEX 20 MIN: CPT

## 2018-09-05 PROCEDURE — 83735 ASSAY OF MAGNESIUM: CPT

## 2018-09-05 PROCEDURE — 80053 COMPREHEN METABOLIC PANEL: CPT

## 2018-09-05 PROCEDURE — G8980 MOBILITY D/C STATUS: HCPCS | Mod: CK

## 2018-09-05 PROCEDURE — G8979 MOBILITY GOAL STATUS: HCPCS | Mod: CI

## 2018-09-05 PROCEDURE — 25000003 PHARM REV CODE 250: Performed by: HOSPITALIST

## 2018-09-05 PROCEDURE — 94761 N-INVAS EAR/PLS OXIMETRY MLT: CPT

## 2018-09-05 PROCEDURE — A4216 STERILE WATER/SALINE, 10 ML: HCPCS | Performed by: HOSPITALIST

## 2018-09-05 PROCEDURE — 36415 COLL VENOUS BLD VENIPUNCTURE: CPT

## 2018-09-05 PROCEDURE — 99232 SBSQ HOSP IP/OBS MODERATE 35: CPT | Mod: ,,, | Performed by: UROLOGY

## 2018-09-05 RX ORDER — ACETAMINOPHEN 325 MG/1
650 TABLET ORAL EVERY 8 HOURS PRN
Refills: 0 | COMMUNITY
Start: 2018-09-05 | End: 2020-03-25

## 2018-09-05 RX ORDER — CIPROFLOXACIN 500 MG/1
500 TABLET ORAL EVERY 12 HOURS
Qty: 20 TABLET | Refills: 0 | Status: SHIPPED | OUTPATIENT
Start: 2018-09-05 | End: 2018-09-17

## 2018-09-05 RX ORDER — HYDROCODONE BITARTRATE AND ACETAMINOPHEN 5; 325 MG/1; MG/1
1 TABLET ORAL EVERY 8 HOURS PRN
Qty: 12 TABLET | Refills: 0 | Status: SHIPPED | OUTPATIENT
Start: 2018-09-05 | End: 2020-03-25

## 2018-09-05 RX ADMIN — ARIPIPRAZOLE 5 MG: 5 TABLET ORAL at 09:09

## 2018-09-05 RX ADMIN — DOCUSATE SODIUM AND SENNOSIDES 1 TABLET: 8.6; 5 TABLET, FILM COATED ORAL at 09:09

## 2018-09-05 RX ADMIN — AMLODIPINE BESYLATE 5 MG: 5 TABLET ORAL at 09:09

## 2018-09-05 RX ADMIN — Medication 3 ML: at 05:09

## 2018-09-05 RX ADMIN — OXCARBAZEPINE 300 MG: 150 TABLET ORAL at 09:09

## 2018-09-05 NOTE — SUBJECTIVE & OBJECTIVE
Interval History: She feels well and would like to go home.    Review of Systems   Constitutional: Negative.    HENT: Negative.    Eyes: Negative.    Respiratory: Negative for cough, chest tightness and shortness of breath.    Cardiovascular: Negative for chest pain.   Gastrointestinal: Negative.  Negative for constipation, diarrhea and nausea.   Musculoskeletal: Negative.    Neurological: Negative.    Psychiatric/Behavioral: Negative.      Objective:     Temp:  [97.2 °F (36.2 °C)-98.5 °F (36.9 °C)] 98.5 °F (36.9 °C)  Pulse:  [] 101  Resp:  [18] 18  SpO2:  [90 %-97 %] 93 %  BP: (133-146)/(63-93) 146/78     Body mass index is 31.04 kg/m².            Drains          None          Physical Exam   Nursing note and vitals reviewed.  Constitutional: She is oriented to person, place, and time. She appears well-developed.   HENT:   Head: Normocephalic.   Eyes: Conjunctivae are normal.   Neck: Normal range of motion. No tracheal deviation present. No thyromegaly present.   Cardiovascular: Normal rate, normal heart sounds and normal pulses.    Pulmonary/Chest: Effort normal and breath sounds normal. No respiratory distress. She has no wheezes.   Abdominal: Soft. She exhibits no distension and no mass. There is no hepatosplenomegaly. There is no tenderness. There is no rebound, no guarding and no CVA tenderness. No hernia.   Musculoskeletal: Normal range of motion. She exhibits no edema or tenderness.   Lymphadenopathy:     She has no cervical adenopathy.   Neurological: She is alert and oriented to person, place, and time.   Skin: Skin is warm and dry. No rash noted. No erythema.     Psychiatric: She has a normal mood and affect. Her behavior is normal. Judgment and thought content normal.       Significant Labs:    BMP:  Recent Labs   Lab  09/04/18   0527  09/04/18   1759  09/05/18   0426   NA  138  141  142   K  3.1*  3.4*  3.6   CL  103  105  105   CO2  27  26  23   BUN  11  13  16   CREATININE  0.9  0.9  0.9    CALCIUM  9.6  9.9  10.3       CBC:   Recent Labs   Lab  09/04/18   0527  09/04/18   1759  09/05/18   0426   WBC  9.88  7.99  8.84   HGB  12.0  12.3  12.2   HCT  37.0  37.0  37.5   PLT  272  272  318       Blood Culture: No results for input(s): LABBLOO in the last 168 hours.  Urine Culture:   Recent Labs   Lab  09/03/18   1756   LABURIN  No growth       Significant Imaging:

## 2018-09-05 NOTE — PLAN OF CARE
Problem: Sepsis/Septic Shock (Adult)  Intervention: Promote Rest/Minimize Oxygen Consumption   09/05/18 0502   Activity   Activity Management activity adjusted per tolerance*   Coping/Psychosocial Interventions   Environmental Support calm environment promoted   Restraint Interventions   Range Of Motion Bilateral Upper and Lower Extremities   Bilateral Upper and Lower Extremities Range Of Motion AROM (active range of motion) performed

## 2018-09-05 NOTE — PROGRESS NOTES
[place] contacted Dr. Cabrera's office at [phone] to schedule urology f/u as as possible; spoke with Romina; scheduled on 09/06/18 at 9:40 AM.

## 2018-09-05 NOTE — ASSESSMENT & PLAN NOTE
-- Clinically much improved with IV abx  -- Will defer stent/PCN for now due to lack of significant obstruction on CT and improved clinical picture  Okay to d/c home with 2 weeks of antibiotics.    Okay to d/c home today from a  standpoint

## 2018-09-05 NOTE — PROGRESS NOTES
Follow-up Information     Jesus Thrasher MD On 9/12/2018.    Specialty:  Family Medicine  Why:  Outpatient Services PCP Follow-up Wednesday at 2:30 PM.  Contact information:  [address]  [facility]  [place] [address]  [phone]             Misa Cabrera MD. Schedule an appointment as soon as possible for a visit on 9/6/2018.    Specialty:  Urology  Why:  Outpatient Services Urology Follow-Up Thursday at 9:40 AM  Contact information:  [address]  [address]  [place] [address]  [phone]             [facility].    Specialty:  Home Health Services  Why:  Home Health Agency will contact patient to initiate services  Contact information:  [address] [address]  [phone]             Jesus Thrasher MD In 1 week.    Specialty:  Family Medicine  Contact information:  [address]  [facility]  [place] [address]  [phone]               PLEASE BRING TO ALL FOLLOW UP APPOINTMENTS:   A COPY YOUR DISCHARGE INSTRUCTIONS, Any new MEDICINES YOU ARE CURRENTLY TAKING IN THEIR ORIGINAL BOTTLES  And IDENTIFICATION AND INSURANCE CARD     **PLEASE ARRIVE 15 MINUTES AHEAD OF SCHEDULED APPOINTMENT TIME   ++PLEASE CALL 24 HOURS IN ADVANCE IF YOU MUST RESCHEDULE YOUR APPOINTMENT DAY AND/OR TIME     [facility]    WRITTEN HEALTHCARE AND DISCHARGE INFORMATION                        Help at Home           [phone]  After discharge for assistance [facility] On Call Nurse Care Line 24/7  Assistance    Things You are responsible For To Manage Your Care At Home:  1.    Getting your prescriptions filled   2.    Taking your medications as directed, DO NOT MISS ANY DOSES!  3.    Going to your follow-up doctor appointment. This is important because it  allow the doctor to monitor your progress and determine if  any changes need to made to your treatment plan.     Thank you for choosing [facility] for your care.  Please answer any calls you may receive from [facility] we want  to continue to support you as you manage your healthcare needs. [facility] is happy to have the opportunity to serve you.     Sincerely,  Your [facility] Team,  JEANNIE Valerio, [place];  [phone]

## 2018-09-05 NOTE — PLAN OF CARE
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]  Yes    Hypertension [I10]  Yes    Multiple sclerosis [G35]  Yes      Resolved Hospital Problems    Diagnosis Date Resolved POA    Hypokalemia [E87.6] 09/05/2018 Yes    Sepsis due to urinary tract infection [A41.9, N39.0] 09/05/2018 Yes       Future Appointments   Date Time Provider Department Center   9/6/2018  9:40 AM Misa Cabrera MD [facility]   9/7/2018 11:40 AM INJECTION, INFECTIOUS DISEASES [facility] [address]   9/7/2018  2:45 PM Carlota Duran PA-C [facility] [address]   11/7/2018 11:30 AM INJECTION, INFECTIOUS DISEASES [facility] [address]   2/7/2019 10:40 AM INJECTION, INFECTIOUS DISEASES [facility] [address]     Follow-up Information     Jesus Thrasher MD On 9/12/2018.    Specialty:  Family Medicine  Why:  Outpatient Services PCP Follow-up Wednesday at 2:30 PM.  Contact information:  [address]  [facility]  [place] [address]  [phone]             Misa Cabrera MD. Schedule an appointment as soon as possible for a visit on 9/6/2018.    Specialty:  Urology  Why:  Outpatient Services Urology Follow-Up Thursday at 9:40 AM  Contact information:  [address]  [address]  [place] [address]  [phone]             [facility].    Specialty:  Home Health Services  Why:  Home Health Agency will contact patient to initiate services  Contact information:  [address] [facility]  [address]  [facility] [address]  [phone]             Jesus Thrasher MD In 1 week.    Specialty:  Family Medicine  Contact information:  [address]  [facility]  [place] [address]  [phone]                     I have seen and examined this patient face to face today. My clinical findings that support the need for the home health skilled services and home bound status are the following:  Weakness/numbness causing balance and gait disturbance due to Infection and Multiple Sclerosis making it taxing to leave home.    Allergies:  Review of patient's allergies indicates:   Allergen Reactions    Vancomycin analogues Itching       Diet: cardiac diet    Activities: activity as tolerated    Nursing:   SN to complete comprehensive assessment including routine vital signs. Instruct on disease process and s/s of complications to report to MD. Review/verify medication list sent home with the patient at time of discharge  and instruct patient/caregiver as needed. Frequency may be adjusted depending on start of care date.    Notify MD if SBP > 160 or < 90; DBP > 90 or < 50; HR > 120 or < 50; Temp > 101      CONSULTS:    Physical Therapy to evaluate and treat. Evaluate for home safety and equipment needs; Establish/upgrade home exercise program. Perform / instruct on therapeutic exercises, gait training, transfer training, and Range of Motion.  Occupational Therapy to evaluate and treat. Evaluate home environment for safety and equipment needs. Perform/Instruct on transfers, ADL training, ROM, and therapeutic exercises.    Medications: Review discharge medications with patient and family and provide education.      Current Discharge Medication List      START taking these medications    Details   acetaminophen (TYLENOL) 325 MG tablet Take 2 tablets (650 mg total) by mouth every 8 (eight) hours as needed for Temperature greater than (100).  Refills: 0      ciprofloxacin HCl (CIPRO) 500 MG tablet Take 1 tablet (500 mg total) by  mouth every 12 (twelve) hours. for 10 days  Qty: 20 tablet, Refills: 0      HYDROcodone-acetaminophen (NORCO) 5-325 mg per tablet Take 1 tablet by mouth every 8 (eight) hours as needed for Pain.  Qty: 12 tablet, Refills: 0         CONTINUE these medications which have NOT CHANGED    Details   amlodipine (NORVASC) 5 MG tablet Take 5 mg by mouth once daily.      ARIPiprazole (ABILIFY) 5 MG Tab Take 1 tablet (5 mg total) by mouth once daily.  Qty: 30 tablet, Refills: 11    Associated Diagnoses: MS (multiple sclerosis); Dysthymia      escitalopram oxalate (LEXAPRO) 20 MG tablet TAKE 1 TABLET (20 MG TOTAL) BY MOUTH EVERY EVENING.  Qty: 30 tablet, Refills: 3      lorazepam (ATIVAN) 2 MG Tab Take 1 tablet (2 mg total) by mouth 3 (three) times daily as needed.  Qty: 90 tablet, Refills: 0      mirabegron (MYRBETRIQ) 25 mg Tb24 ER tablet Take 1 tablet (25 mg total) by mouth once daily.  Qty: 30 tablet, Refills: 11    Associated Diagnoses: Neurogenic bladder      OXcarbazepine (TRILEPTAL) 300 MG Tab TAKE 1 TABLET (300 MG TOTAL) BY MOUTH 2 (TWO) TIMES DAILY.  Qty: 60 tablet, Refills: 5      rosuvastatin (CRESTOR) 10 MG tablet Take 10 mg by mouth every evening.   Refills: 11      varicella-zoster gE-AS01B, PF, (SHINGRIX, PF,) 50 mcg/0.5 mL injection Inject 0.5 mLs into the muscle every 6 (six) months. for 2 doses  Qty: 1 mL, Refills: 0             I certify that this patient is confined to her home and needs intermittent skilled nursing care, physical therapy and occupational therapy.

## 2018-09-05 NOTE — PLAN OF CARE
Problem: Pain, Acute (Adult)  Intervention: Support/Optimize Psychosocial Response to Acute Pain   09/05/18 0501   Coping/Psychosocial Interventions   Supportive Measures active listening utilized   Psychosocial Support   Diversional Activities television   Family/Support System Care self-care encouraged   Trust Relationship/Rapport care explained

## 2018-09-05 NOTE — PLAN OF CARE
Problem: Pain, Acute (Adult)  Intervention: Mutually Develop/Implement Acute Pain Management Plan   09/03/18 0200 09/05/18 0502   Pain/Comfort/Sleep Interventions   Pain Management Interventions --  awakened for pain meds per patient request;pain management plan reviewed with patient/caregiver   Cognitive Interventions   Sensory Stimulation Regulation auditory stimulation minimized;care clustered;lighting decreased;quiet environment promoted --

## 2018-09-05 NOTE — PT/OT/SLP EVAL
Physical Therapy Evaluation    Patient Name:  Deb Figueroa   MRN:  [id number]    Recommendations:     Discharge Recommendations:  outpatient PT   Discharge Equipment Recommendations: none   Barriers to discharge: None    Assessment:     Deb Figueroa is a 57 y.o. female admitted with a medical diagnosis of Sepsis secondary to UTI.  She presents with the following impairments/functional limitations:  weakness, impaired endurance, impaired self care skills, impaired functional mobilty, gait instability, impaired balance, decreased coordination, decreased upper extremity function, decreased lower extremity function, decreased safety awareness, abnormal tone.    Rehab Prognosis:  Fair+; patient would benefit from acute skilled PT services to address these deficits and reach maximum level of function.      Recent Surgery: * No surgery found *      Plan:     During this hospitalization, patient to be seen 5 x/week(M-F) to address the above listed problems via gait training, therapeutic activities, therapeutic exercises  · Plan of Care Expires:  09/19/18   Plan of Care Reviewed with: patient    Subjective     Patient found in bed upon PT entry to room, agreeable to evaluation.      Chief Complaint: weakness  Patient comments/goals: Pt would like to go home.   Pain/Comfort:  Pain Rating 1: 0/10      Living Environment:  Pt lives with spouse and mother-in-law in a 2 story house with ~4 steps and B HR at entry.  Pt's bedroom/bathroom are on the 1st floor.  Pt also has assistance from her sister/mother.   Prior to admission, patients level of function was mod I with ambulation using a RW vs rollator.  Patient has the following equipment: walker, rolling, rollator, wheelchair, shower chair.  Upon discharge, patient will have assistance from family.    Objective:     Patient found with: peripheral IV, telemetry([employer]-sens[employer] monitor)     General Precautions: Standard, fall   Orthopedic Precautions:N/A   Braces: N/A      Exams:  · Cognitive Exam:  Patient was able to follow multiple commands.   · Gross Motor Coordination:  WFL  · Postural Exam:  Patient presented with the following abnormalities:    · -       No postural abnormalities identified  · Sensation:    · -       Intact  light/touch BLE  · Skin Integrity/Edema:      · -       Skin integrity: Visible skin intact  · -       Edema: None noted BLE  · RLE ROM: WFL  · RLE Strength: 4/5  · LLE ROM: WFL  · LLE Strength: 4/5    Functional Mobility:  · Bed Mobility:     · Scooting: stand by assistance and contact guard assistance  · Supine to Sit: minimum assistance with HOB elevated   · Transfers:     · Sit to Stand:  contact guard assistance with rolling walker  · Bed to Chair: contact guard assistance with  rolling walker  using  Step Transfer  · Gait: Pt ambulated ~40 ft with min A-CGA/RW and chair to follow.  Pt with minimal unsteadiness, no LOB.  Pt with narrow HENRI, decreased foot clearance (occasional toe drag), decreased heel to toe push off, decreased step length, and isidra.  Pt easily fatigued with increased in unsteadiness, requiring min A.    · Balance: Pt with fair+ sit balance and fair stand balance.    AM-PAC 6 CLICK MOBILITY  Total Score:19     Patient left up in chair with all lines intact, call button in reach, nurse Maya notified and [employer] monitor present.    GOALS:   Multidisciplinary Problems     Physical Therapy Goals        Problem: Physical Therapy Goal    Goal Priority Disciplines Outcome Goal Variances Interventions   Physical Therapy Goal     PT, PT/OT      Description:  Goals to be met by: 18     Patient will increase functional independence with mobility by performin. Supine to sit with Modified [place]  2. Rolling to Left and Right with Modified [place]  3. Sit to stand transfer with Modified [place] using RW  4. Bed to chair transfer with Modified [place] using Rolling Walker  5. Gait  x 150-200 feet with Modified  [place] using Rolling Walker   6. Lower extremity exercise program 3 sets x10 reps per handout, with independence                      History:     Past Medical History:   Diagnosis Date    Hypertension     MS (multiple sclerosis)     Nephrolithiasis        Past Surgical History:   Procedure Laterality Date    LITHOTRIPSY  2016    Large renal stone.       Clinical Decision Making:     History  Co-morbidities and personal factors that may impact the plan of care Examination  Body Structures and Functions, activity limitations and participation restrictions that may impact the plan of care Clinical Presentation   Decision Making/ Complexity Score   Co-morbidities:   [] Time since onset of injury / illness / exacerbation  [x] Status of current condition  [x]Patient's cognitive status and safety concerns    [] Multiple Medical Problems (see med hx)  Personal Factors:   [] Patient's age  [] Prior Level of function   [x] Patient's home situation (environment and family support)  [] Patient's level of motivation  [] Expected progression of patient      HISTORY:(criteria)    [] 85909 - no personal factors/history    [] 87915 - has 1-2 personal factor/comorbidity     [x] 78889 - has >3 personal factor/comorbidity     Body Regions:  [x] Objective examination findings  [] Head     []  Neck  [] Trunk   [x] Upper Extremity  [x] Lower Extremity    Body Systems:  [x] For communication ability, affect, cognition, language, and learning style: the assessment of the ability to make needs known, consciousness, orientation (person, place, and time), expected emotional /behavioral responses, and learning preferences (eg, learning barriers, education  needs)  [x] For the neuromuscular system: a general assessment of gross coordinated movement (eg, balance, gait, locomotion, transfers, and transitions) and motor function  (motor control and motor learning)  [x] For the musculoskeletal system: the assessment of gross symmetry,  gross range of motion, gross strength, height, and weight  [] For the integumentary system: the assessment of pliability(texture), presence of scar formation, skin color, and skin integrity  [] For cardiovascular/pulmonary system: the assessment of heart rate, respiratory rate, blood pressure, and edema     Activity limitations:    [x] Patient's cognitive status and saf ety concerns          [x] Status of current condition      [] Weight bearing restriction  [] Cardiopulmunary Restriction    Participation Restrictions:   [] Goals and goal agreement with the patient     [] Rehab potential (prognosis) and probable outcome      Examination of Body System: (criteria)    [] 81020 - addressing 1-2 elements    [x] 45797 - addressing a total of 3 or more elements     [] 24582 -  Addressing a total of 4 or more elements         Clinical Presentation: (criteria)  Stable - 18843     On examination of body system using standardized tests and measures patient presents with 3 or more elements from any of the following: body structures and functions, activity limitations, and/or participation restrictions.  Leading to a clinical presentation that is considered stable and/or uncomplicated                              Clinical Decision Making  (Eval Complexity):  Low- 80743     Time Tracking:     PT Received On: 09/05/18  PT Start Time: 0952     PT Stop Time: 1015  PT Total Time (min): 23 min     Billable Minutes: Evaluation 15 min co-eval with TEO Ochoa, PT  09/05/2018

## 2018-09-05 NOTE — PLAN OF CARE
09/05/18 1407   Final Note   Assessment Type Final Discharge Note   Discharge Disposition Home

## 2018-09-05 NOTE — MEDICAL/APP STUDENT
Deb Figueroa is a 57 y.o. female patient with HTN, MS and a PMHx of Nephrolithiasis who presented from an OSH for right flank pain secondary to right nephrolithiasis.     This morning the patient is well appearing and in NAD. She states she is doing well with no new problems. Pain is 1/10.         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     Temp: 98.5 °F (36.9 °C) (09/05/18 0746)  Pulse: 101 (09/05/18 0746)  Resp: 18 (09/05/18 0746)  BP: (!) 146/78 (09/05/18 0746)  SpO2: (!) 93 % (09/05/18 0746)  Weight: 72.1 kg (158 lb 15.2 oz) (09/04/18 0450)  Height: 5' (152.4 cm) (09/03/18 0216)      Physical Exam   Constitutional: She is oriented to person, place, and time. She appears well-developed and well-nourished. No distress.   HENT:   Head: Normocephalic and atraumatic.   Eyes: EOM are normal. Pupils are equal, round, and reactive to light.   Neck: Normal range of motion. Neck supple.   Cardiovascular: Normal rate, regular rhythm and normal heart sounds.   No murmur heard.  Pulmonary/Chest: Effort normal and breath sounds normal. No respiratory distress.   Abdominal: Soft. Bowel sounds are normal. She exhibits no distension. There is no tenderness. There is no guarding.   Neurological: She is alert and oriented to person, place, and time.   Skin: Skin is warm and dry.     Sepsis secondary to UTI:  -UTI caused by E. Coli  -tx with Rocephin  -currently afebrile with normal wbc count  -DC on oral antibiotics    Right Nephrolithiasis  -CT: large renal stone without obstruction  -Urology consult: Consult for PCNL at later date      Emphysematous  pyelitis  -Clinical improvement with IV antibiotics.  -DC home with 2 wks of antibiotics    OK to DC home today  -Follow up with urology for PCNL    Krystal Ma  9/5/2018

## 2018-09-05 NOTE — DISCHARGE SUMMARY
[facility] Hospital Medicine  Discharge Summary      Patient Name: Deb Figueroa  MRN: [id number]  Admission Date: 9/3/2018  Hospital Length of Stay: 2 days  Discharge Date and Time:  09/05/2018 12:45 PM  Attending Physician: Charles Chinchilla MD   Discharging Provider: Charles Chinchilla MD  Primary Care Provider: Jesus Thrasher MD      HPI:   No notes on file    * No surgery found *      Hospital Course:   Ms. Figueroa is a 58 yo woman who presented from [facility] for UTI associated with a kidney stone measuring ~3mm in diameter. Transferred for urology services. She was empirically started on Zosyn.  Urology consulted.  No need for stent placement as no evidence of obstruction.  E coli UTI.  Negative BCx.  Patient has remained afebrile and hemodynamically stable.  Currently asymptomatic.  She will be switched to PO Cipro for 10 more days.  She will follow up with Urology as she may need procedure at a later date to treat stone.  Patient with MS and already scheduled to see Neurologist this week.  She will be discharged home.         Consults:   Consults (From admission, onward)        Status Ordering Provider     Inpatient consult to Urology  Once     Provider:  Misa Cabrera MD    Completed EZEQUIEL WINSTON consult to case management  Once     Provider:  (Not yet assigned)    IWONA Tuttle          No new Assessment & Plan notes have been filed under this hospital service since the last note was generated.  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    Specialty:  Family Medicine  Why:  Outpatient Services PCP Follow-up Wednesday at 2:30 PM.  Contact information:  [address]  [facility]  [facility]  [phone]             Misa Cabrera MD. Schedule an appointment as soon as possible for a visit on 9/6/2018.    Specialty:  Urology  Why:  Outpatient Services Urology Follow-Up Thursday at 9:40 AM  Contact information:  [address]  [address]  [facility] [address]  [phone]             [facility].    Specialty:  Home Health Services  Why:  Home Health Agency will contact patient to initiate services  Contact information:  [address] [address]  [phone]             Jesus Thrasher MD In 1 week.    Specialty:  Family Medicine  Contact information:  [address]  [facility]  [facility]  [phone]                 Patient Instructions:      Diet Cardiac     Notify your health care provider if you experience any of the following:  temperature >100.4     Notify your health care provider if you experience any of the following:  persistent nausea and vomiting or diarrhea     Notify your health care provider if you experience any of the following:  severe uncontrolled pain     Notify your health care provider if you experience any of the following:  difficulty breathing or increased cough     Notify your health care provider if you experience any of the following:  persistent dizziness, light-headedness, or visual disturbances     Notify your health care provider if you experience any of the following:  increased confusion or weakness     Activity as tolerated       Pending Diagnostic Studies:     None          Medications:  Reconciled Home Medications:      Medication List      START taking these medications    acetaminophen 325 MG tablet  Commonly known as:  TYLENOL  Take 2 tablets (650 mg total) by mouth every 8 (eight) hours as needed for Temperature greater than (100).     ciprofloxacin HCl 500 MG tablet  Commonly known as:  CIPRO  Take 1 tablet (500 mg total) by mouth every 12 (twelve) hours. for 10 days     HYDROcodone-acetaminophen 5-325 mg per tablet  Commonly known as:  NORCO  Take 1 tablet by mouth every 8 (eight) hours as needed for Pain.        CHANGE how you take these medications    escitalopram oxalate 20 MG tablet  Commonly known as:  LEXAPRO  TAKE 1 TABLET (20 MG TOTAL) BY MOUTH EVERY EVENING.  What changed:  Another medication with the same name was removed. Continue taking this medication, and follow the directions you see here.        CONTINUE taking these medications    amLODIPine 5 MG tablet  Commonly known as:  NORVASC  Take 5 mg by mouth once daily.     ARIPiprazole 5 MG Tab  Commonly known as:  ABILIFY  Take 1 tablet (5 mg total) by mouth once daily.     LORazepam 2 MG Tab  Commonly known as:  ATIVAN  Take 1 tablet (2 mg total) by mouth 3 (three) times daily as needed.     mirabegron 25 mg Tb24 ER tablet  Commonly known as:  MYRBETRIQ  Take 1 tablet (25 mg total) by mouth once daily.     OXcarbazepine 300 MG Tab  Commonly known as:  TRILEPTAL  TAKE 1 TABLET (300 MG TOTAL) BY MOUTH 2 (TWO) TIMES DAILY.     rosuvastatin 10 MG tablet  Commonly known as:  CRESTOR  Take 10 mg by mouth every evening.     varicella-zoster gE-AS01B (PF) 50 mcg/0.5 mL injection  Commonly known as:  SHINGRIX (PF)  Inject 0.5 mLs into the muscle every 6 (six) months. for 2 doses            Indwelling Lines/Drains at time of discharge:   Lines/Drains/Airways          None          Time spent on the discharge of patient: >30 minutes  Patient was seen and examined on the date of discharge and determined to be suitable for  discharge.         Charles Chinchilla MD  Department of Hospital Medicine  [facility]

## 2018-09-05 NOTE — PT/OT/SLP EVAL
Occupational Therapy   Evaluation/Treatment    Name: Deb Figueroa  MRN: [id number]  Admitting Diagnosis:  Sepsis secondary to UTI      Recommendations:     Discharge Recommendations: outpatient OT  Discharge Equipment Recommendations:  none  Barriers to discharge:  None    History:     Occupational Profile:  Living Environment: The patient lives with her spouse and mother-in-law in a 2 story house with4 TAWANNA. Her bed room and bathroom are on the 1st floor.  Previous level of function: The patient is able to dress herself and amb using a rollator or RW. The spouse assist with dressing and tub transfer as needed.  Roles and Routines: The patient does not drive. She has a history of falls at home 2* LOB/LE weakness  Equipment Used at Home:  walker, rolling, rollator, shower chair, wheelchair  Assistance upon Discharge: spouse, Mother-in-law, mother and sister    Past Medical History:   Diagnosis Date    Hypertension     MS (multiple sclerosis)     Nephrolithiasis        Past Surgical History:   Procedure Laterality Date    LITHOTRIPSY  2016    Large renal stone.       Subjective     Chief Complaint: getting weak from being in the hospital  Patient/Family Comments/goals: go home and agreeable to Out patient OT    Pain/Comfort:  · Pain Rating 1: 0/10    Patients cultural, spiritual, Yazidi conflicts given the current situation: none    Objective:     Communicated with: nurse prior to session.  Patient found  telemetry, peripheral IV(Avasys Safety Monitor) upon OT entry to room.    General Precautions: Standard, fall   Orthopedic Precautions:N/A   Braces: N/A     Occupational Performance:    Bed Mobility:    · Patient completed Scooting/Bridging with contact guard assistance  · Patient completed Supine to Sit with contact guard assistance    Functional Mobility/Transfers:  · Patient completed Sit <> Stand Transfer with contact guard assistance  with  rolling walker   · Patient completed Bed <> Chair Transfer using  "Step Transfer technique with contact guard assistance with rolling walker  · Functional Mobility: The patient amb using a RW with CGA/min assist with patient having difficulty clearing feet.    Activities of Daily Living:  · Upper Body Dressing: minimum assistance to don/doff gown while seated on the EOB  · Toileting: The patient was incontinent of urine in bed. The patient was able to clean self using wipes on the EOB.      Cognitive/Visual Perceptual:  Cognitive/Psychosocial Skills:     -       Oriented to: Person, Place and Situation   -       Follows Commands/attention:Follows two-step commands  -       Communication: clear/fluent  -       Memory: No Deficits noted  -       Safety awareness/insight to disability: impaired   -       Mood/Affect/Coping skills/emotional control: Appropriate to situation    Physical Exam:  Balance: -       fair+  Postural examination/scapula alignment:    -       No postural abnormalities identified  Skin integrity: Visible skin intact  Edema:  None noted  Sensation:    -       Impaired  The patient c/o decreased sensation and weakness in B hands ("has good and bad days"    Upper Extremity Range of Motion:     -       Right Upper Extremity: WFL    -       Left Upper Extremity: WFL      Upper Extremity Strength:    -       Right Upper Extremity: WFL  -       Left Upper Extremity: WFL     Strength:    -       Right Upper Extremity: WFL    -       Left Upper Extremity: WFL      AMPAC 6 Click ADL:  AMPAC Total Score: 20    Treatment & Education:  The patient participated in an OT eval. The patient was educated re: OT role and POC. The patient was educated re: need to call for assistance to return to bed 2* fall risk.  Education:    Patient left up in chair with all lines intact, call button in reach and nurseMaya notified    Assessment:     Deb Figueroa is a 57 y.o. female with a medical diagnosis of Sepsis secondary to UTI.  She presents with the following performance deficits " "affecting function: weakness, impaired endurance, impaired self care skills, gait instability, impaired functional mobilty, impaired balance, decreased safety awareness, impaired sensation, decreased coordination, decreased lower extremity function.  The patient will benefit from OT to address functional deficits. The patient will require 24* (S)/assist for safetywill have help available as needed at home.    Rehab Prognosis: Good; patient would benefit from acute skilled OT services to address these deficits and reach maximum level of function.         Clinical Decision Makin.  OT Low:  "Pt evaluation falls under low complexity for evaluation coding due to performance deficits noted in 1-3 areas as stated above and 0 co-morbities affecting current functional status. Data obtained from problem focused assessments. No modifications or assistance was required for completion of evaluation. Only brief occupational profile and history review completed."     Plan:     Patient to be seen 3 x/week to address the above listed problems via self-care/home management, therapeutic activities, therapeutic exercises  · Plan of Care Expires:    · Plan of Care Reviewed with: patient    This Plan of care has been discussed with the patient who was involved in its development and understands and is in agreement with the identified goals and treatment plan    GOALS:   Multidisciplinary Problems     Occupational Therapy Goals        Problem: Occupational Therapy Goal    Goal Priority Disciplines Outcome Interventions   Occupational Therapy Goal     OT, PT/OT Ongoing (interventions implemented as appropriate)    Description:  Goals to be met by: 18    Patient will increase functional independence with ADLs by performing:    Feeding with Modified [place].  UE Dressing with Modified [place] and Supervision.  LE Dressing with Modified [place] and Supervision.  Grooming while standing at sink with Stand-by " Assistance.  Toileting from toilet with Modified [place] and Supervision for hygiene and clothing management.   Supine to sit with Supervision.  Stand pivot transfers with Stand-by Assistance.  Toilet transfer to toilet with Stand-by Assistance.  Upper extremity exercise program x10 reps per handout, with assistance as needed.                      Time Tracking:     OT Date of Treatment: 09/05/18  OT Start Time: 0952  OT Stop Time: 1015  OT Total Time (min): 23 min    Billable Minutes:Evaluation 15  Self Care/Home Management 8  Total Time 23 (with PT)    Leslee Garcia OT  9/5/2018

## 2018-09-05 NOTE — PROGRESS NOTES
[facility]  Urology  Progress Note    Patient Name: Deb Figueroa  MRN: [id number]  Admission Date: 9/3/2018  Hospital Length of Stay: 2 days  Code Status: Full Code   Attending Provider: Charles Chinchilla MD   Primary Care Physician: Jesus Thrasher MD    Subjective:     HPI:  58 y/o female w/ h/o nephrolithiasis presented to outside ER yesterday with acute onset right flank pain. Workup demonstrated right renal stone and UTI and she was transferred here for urologic evaluation.    She had right PCNL 2-4 years ago at [facility].    She has been AF and HDS since presentation. IV abx started in ER. Pain is notably improved today and overall she feels much better.    Interval History: She feels well and would like to go home.    Review of Systems   Constitutional: Negative.    HENT: Negative.    Eyes: Negative.    Respiratory: Negative for cough, chest tightness and shortness of breath.    Cardiovascular: Negative for chest pain.   Gastrointestinal: Negative.  Negative for constipation, diarrhea and nausea.   Musculoskeletal: Negative.    Neurological: Negative.    Psychiatric/Behavioral: Negative.      Objective:     Temp:  [97.2 °F (36.2 °C)-98.5 °F (36.9 °C)] 98.5 °F (36.9 °C)  Pulse:  [] 101  Resp:  [18] 18  SpO2:  [90 %-97 %] 93 %  BP: (133-146)/(63-93) 146/78     Body mass index is 31.04 kg/m².            Drains          None          Physical Exam   Nursing note and vitals reviewed.  Constitutional: She is oriented to person, place, and time. She appears well-developed.   HENT:   Head: Normocephalic.   Eyes: Conjunctivae are normal.   Neck: Normal range of motion. No tracheal deviation present. No thyromegaly present.   Cardiovascular: Normal rate, normal heart sounds and normal pulses.    Pulmonary/Chest: Effort normal and breath sounds normal. No respiratory distress. She has no wheezes.   Abdominal: Soft. She exhibits no distension and no mass. There is no hepatosplenomegaly. There is no  tenderness. There is no rebound, no guarding and no CVA tenderness. No hernia.   Musculoskeletal: Normal range of motion. She exhibits no edema or tenderness.   Lymphadenopathy:     She has no cervical adenopathy.   Neurological: She is alert and oriented to person, place, and time.   Skin: Skin is warm and dry. No rash noted. No erythema.     Psychiatric: She has a normal mood and affect. Her behavior is normal. Judgment and thought content normal.       Significant Labs:    BMP:  Recent Labs   Lab  09/04/18 0527 09/04/18 1759 09/05/18 0426   NA  138  141  142   K  3.1*  3.4*  3.6   CL  103  105  105   CO2  27  26  23   BUN  11  13  16   CREATININE  0.9  0.9  0.9   CALCIUM  9.6  9.9  10.3       CBC:   Recent Labs   Lab  09/04/18 0527 09/04/18 1759 09/05/18 0426   WBC  9.88  7.99  8.84   HGB  12.0  12.3  12.2   HCT  37.0  37.0  37.5   PLT  272  272  318       Blood Culture: No results for input(s): LABBLOO in the last 168 hours.  Urine Culture:   Recent Labs   Lab  09/03/18 1756   LABURIN  No growth       Significant Imaging:                    Assessment/Plan:     Emphysematous pyelitis    -- Clinically much improved with IV abx  -- Will defer stent/PCN for now due to lack of significant obstruction on CT and improved clinical picture  Okay to d/c home with 2 weeks of antibiotics.    Okay to d/c home today from a  standpoint        Right nephrolithiasis    -- Large renal stone without obstruction  -- Explained that she will need procedure (likely PCNL) at later date to treat this stone, however emergent intervention not indicated at this time            VTE Risk Mitigation (From admission, onward)        Ordered     enoxaparin injection 40 mg  Daily      09/04/18 1311     Place sequential compression device  Until discontinued      09/03/18 0147     Place ANEL hose  Until discontinued      09/03/18 0147     IP VTE HIGH RISK PATIENT  Once      09/03/18 0147          URIEL Mendoza  MD  Urology  [facility]

## 2018-09-05 NOTE — PLAN OF CARE
Problem: Occupational Therapy Goal  Goal: Occupational Therapy Goal  Goals to be met by: 9/19/18    Patient will increase functional independence with ADLs by performing:    Feeding with Modified [place].  UE Dressing with Modified [place] and Supervision.  LE Dressing with Modified [place] and Supervision.  Grooming while standing at sink with Stand-by Assistance.  Toileting from toilet with Modified [place] and Supervision for hygiene and clothing management.   Supine to sit with Supervision.  Stand pivot transfers with Stand-by Assistance.  Toilet transfer to toilet with Stand-by Assistance.  Upper extremity exercise program x10 reps per handout, with assistance as needed.    Outcome: Ongoing (interventions implemented as appropriate)  Patient will benefit from OT to address functional deficits.    Comments: The patient is able to transfer to a chair using a RW with CGA with nursing assist.    The patient will benefit from Out Patient OT.

## 2018-09-05 NOTE — PLAN OF CARE
Problem: Physical Therapy Goal  Goal: Physical Therapy Goal  Goals to be met by: 18     Patient will increase functional independence with mobility by performin. Supine to sit with Modified [place]  2. Rolling to Left and Right with Modified [place]  3. Sit to stand transfer with Modified [place] using RW  4. Bed to chair transfer with Modified [place] using Rolling Walker  5. Gait  x 150-200 feet with Modified [place] using Rolling Walker   6. Lower extremity exercise program 3 sets x10 reps per handout, with independence    Pt ambulated ~40 ft with min A-CGA/RW.

## 2018-09-05 NOTE — NURSING
Saline lock removed cath tip intact no redness or swelling to site observed. Cardiac monitor removed . Verbalized good understanding of discharge instructions.

## 2018-09-06 NOTE — PT/OT/SLP DISCHARGE
Physical Therapy Discharge Summary    Name: Deb Figueroa  MRN: [id number]   Principal Problem: Sepsis secondary to UTI     Patient Discharged from acute Physical Therapy on 18.  Please refer to prior PT notes for functional status.     Assessment:     Patient was discharged unexpectedly.  Information required to complete an accurate discharge summary is unknown.  Refer to therapy initial evaluation and last progress note for initial and most recent functional status and goal achievement.  Recommendations made may be found in medical record.    Objective:     GOALS:   Multidisciplinary Problems     Physical Therapy Goals        Problem: Physical Therapy Goal    Goal Priority Disciplines Outcome Goal Variances Interventions   Physical Therapy Goal     PT, PT/OT      Description:  Goals to be met by: 18     Patient will increase functional independence with mobility by performin. Supine to sit with Modified [place]  2. Rolling to Left and Right with Modified [place]  3. Sit to stand transfer with Modified [place] using RW  4. Bed to chair transfer with Modified [place] using Rolling Walker  5. Gait  x 150-200 feet with Modified [place] using Rolling Walker   6. Lower extremity exercise program 3 sets x10 reps per handout, with independence                      Reasons for Discontinuation of Therapy Services  Transfer to alternate level of care.      Plan:     Patient Discharged to: Home with Home Health Service.    Naomi Ochoa, PT  2018

## 2018-09-07 ENCOUNTER — OFFICE VISIT (OUTPATIENT)
Dept: NEUROLOGY | Facility: CLINIC | Age: 58
End: 2018-09-07
Payer: COMMERCIAL

## 2018-09-07 ENCOUNTER — CLINICAL SUPPORT (OUTPATIENT)
Dept: INFECTIOUS DISEASES | Facility: CLINIC | Age: 58
End: 2018-09-07
Payer: COMMERCIAL

## 2018-09-07 VITALS
SYSTOLIC BLOOD PRESSURE: 132 MMHG | DIASTOLIC BLOOD PRESSURE: 92 MMHG | HEIGHT: 60 IN | HEART RATE: 85 BPM | WEIGHT: 158 LBS | BODY MASS INDEX: 31.02 KG/M2

## 2018-09-07 DIAGNOSIS — I10 HYPERTENSION, UNSPECIFIED TYPE: ICD-10-CM

## 2018-09-07 DIAGNOSIS — D84.9 IMMUNOSUPPRESSION: ICD-10-CM

## 2018-09-07 DIAGNOSIS — R26.2 IMPAIRED AMBULATION: ICD-10-CM

## 2018-09-07 DIAGNOSIS — G50.0 TRIGEMINAL NEURALGIA: ICD-10-CM

## 2018-09-07 DIAGNOSIS — R26.9 NEUROLOGIC GAIT DYSFUNCTION: ICD-10-CM

## 2018-09-07 DIAGNOSIS — Z71.89 COUNSELING REGARDING GOALS OF CARE: ICD-10-CM

## 2018-09-07 DIAGNOSIS — N31.9 NEUROGENIC BLADDER: ICD-10-CM

## 2018-09-07 DIAGNOSIS — Z79.899 ENCOUNTER FOR LONG-TERM (CURRENT) USE OF HIGH-RISK MEDICATION: ICD-10-CM

## 2018-09-07 DIAGNOSIS — G35 MULTIPLE SCLEROSIS: Primary | ICD-10-CM

## 2018-09-07 PROCEDURE — 90471 IMMUNIZATION ADMIN: CPT | Mod: S$GLB,,, | Performed by: INTERNAL MEDICINE

## 2018-09-07 PROCEDURE — 3008F BODY MASS INDEX DOCD: CPT | Mod: CPTII,S$GLB,, | Performed by: PHYSICIAN ASSISTANT

## 2018-09-07 PROCEDURE — 99999 PR PBB SHADOW E&M-EST. PATIENT-LVL IV: CPT | Mod: PBBFAC,,, | Performed by: PHYSICIAN ASSISTANT

## 2018-09-07 PROCEDURE — 90636 HEP A/HEP B VACC ADULT IM: CPT | Mod: S$GLB,,, | Performed by: INTERNAL MEDICINE

## 2018-09-07 PROCEDURE — 99215 OFFICE O/P EST HI 40 MIN: CPT | Mod: S$GLB,,, | Performed by: PHYSICIAN ASSISTANT

## 2018-09-07 NOTE — PROGRESS NOTES
Patient arrives for immunization injection of Hepatitis A/B - Twinrix - administered per order - patient understands need to remain on campus for at least 15 minutes and report any reaction to staff - left in no apparent distress via personal motorized wheelchair with

## 2018-09-07 NOTE — PATIENT INSTRUCTIONS
https://wwweTobb/s/ref=nb_sb_noss_1?url=search-alias%3Daps&field-keywords=adaptive+devices-reachers    https://www.amazon.com/s/?ie=UTF8&keywords=thera+putty&tag=googhydr-20&index=aps&ifirge=140014784962&hvpos=1t2&hvnetw=g&jdbwbb=69937579711987324201&hvpone=&hvptwo=&hvqmt=b&hvdev=c&hvdvcmdl=&hvlocint=&jdxeatmq=[email or web address]&hvtargid=[email or web address]&ref=pd_[email or web address]    https://wwweTobb/s/ref=nb_sb_noss_1?url=search-alias%3Daps&field-keywords=transfer+tub+bench&rh=i%3Aaps%2Ck%3Atransfer+tub+bench

## 2018-09-07 NOTE — PROGRESS NOTES
"Subjective:       Patient ID: Deb Figueroa is a 57 y.o. female who presents today with her  for a routine clinic visit for MS.      MS HPI:  · DMT: Ocrevus(8/14 & 8/28)  · Side effects from DMT? No  · Taking vitamin D3 as recommended? Yes     Per chart(hospital admission from 9/3-9/5/2018):  Ms. Figueroa is a 56 yo woman who presented from [facility] for UTI associated with a kidney stone measuring ~3mm in diameter. Transferred for urology services. She was empirically started on Zosyn.  Urology consulted.  No need for stent placement as no evidence of obstruction.  E coli UTI.  Negative BCx.  Patient has remained afebrile and hemodynamically stable.  Currently asymptomatic.  She will be switched to PO Cipro for 10 more days.  She will follow up with Urology as she may need procedure at a later date to treat stone.  Patient with MS and already scheduled to see Neurologist this week.  She will be discharged home.     Dr Catarino Nicholas -Urologist in [facility].    She states she felt a boost in her mobility after the first infusion, but then felt "back to normal".  states she was then hospitalized  with above(she is still on antibiotics).  She has just begun HHPT and OT  She is transferring with assist,  is assisting her in getting into/out of tub for bathing-they have a tub rail but it remains very difficult  Needs adaptive equipment in bathroom  She has appt with ID today for vaccination management      SOCIAL HISTORY  Social History     Tobacco Use    Smoking status: Never Smoker    Smokeless tobacco: Never Used   Substance Use Topics    Alcohol use: No    Drug use: No     Living arrangements - the patient lives with their family.  Employment     MS ROS:  · As above          Objective:        1. 25 foot timed walk: she did not perform timed walk; however, she did walk with u-step walker and CGA/Sherri and verbal cues today in clinic/hallway    Neurologic Exam     Mental Status   Oriented to " person, place, and time.   Follows 3 step commands.   Speech: speech is normal   Level of consciousness: alert  Normal comprehension.     Motor Exam     Strength   Right deltoid: 5/5  Left deltoid: 5/5  Right triceps: 5/5  Left triceps: 5/5  Right wrist extension: 5/5  Left wrist extension: 5/5  Right interossei: 4/5  Left interossei: 4/5  Right iliopsoas: 3/5  Left iliopsoas: 4/5  Right hamstrin/5  Left hamstrin/5  Right anterior tibial: 4/5  Left anterior tibial: 5/5      Sit-stand-Sherri today    Imaging:     Results for orders placed during the hospital encounter of 10/16/15   MRI Brain W WO Contrast    Impression Findings in the cerebral white matter which are typical for multiple sclerosis with moderate to severe degree of underlying plaque burden.  Compared to the prior examination of 2013, the overall number and signal intensity of the white matter lesions is stable.  No new enhancing plaques are seen to suggest active disease.    Left maxillary sinus disease.  ______________________________________     Electronically signed by resident: Josh Canales  Date:     10/16/15  Time:    16:12          As the supervising and teaching physician, I personally reviewed the images and resident's interpretation and I agree with the findings.          Electronically signed by: Aisha Stevens MD  Date:     10/16/15  Time:    16:16      Results for orders placed during the hospital encounter of 10/16/15   MRI Cervical Spine W WO Cont    Impression      Focal signal abnormalities again seen within the craniocervical junction and cervical spinal cord likely representing plaques of prior demyelination given patient's history of multiple sclerosis.  No evidence for enhancement to suggest active demyelination.  Overall findings are stable when compared to prior examination from 2013.  ______________________________________     Electronically signed by resident: Josh aCnales  Date:      10/16/15  Time:    16:11          As the supervising and teaching physician, I personally reviewed the images and resident's interpretation and I agree with the findings.          Electronically signed by: Aisha Stevens MD  Date:     10/16/15  Time:    16:15      No results found for this or any previous visit.  Results for orders placed during the hospital encounter of 08/07/18   MRI Brain Demyelinating W W/O Contrast    Impression Findings in the supratentorial and infratentorial white matter which are typical for multiple sclerosis, with a moderate to severe burden of plaques.  Accounting for differences in technique, no new or enhancing lesions to suggest active disease.    Sinus disease.      Electronically signed by: Luis Arguelles MD  Date:    08/07/2018  Time:    16:29     Results for orders placed during the hospital encounter of 08/07/18   MRI Cervical Spine Demyelinating W W/O Contrast    Impression Numerous short-segment T2 hyperintense lesions involving the cervicomedullary junction and the cervical spinal cord are unchanged from 10/16/2015 and are in keeping with demyelinating plaques.  Numerous similar lesions are present throughout the thoracic spinal cord, also in keeping with demyelinating plaques, with no prior imaging available for comparison.  Several lesions have associated myelomalacia.  No enhancing lesions to suggest active demyelination.    Heterogeneous 1.7 cm right thyroid lobe nodule.  Thyroid ultrasound is recommended for further evaluation.    Partially imaged 2.7 cm right renal parapelvic cyst has questionably complex appearance with suggestion of enhancing septae.  Renal ultrasound is recommended for better characterization.    This report was flagged in Epic as abnormal.      Electronically signed by: Luis Arguelles MD  Date:    08/07/2018  Time:    16:29     Results for orders placed during the hospital encounter of 08/07/18   MRI Thoracic Spine Demyelinating W W/O Contrast     Impression Numerous short-segment T2 hyperintense lesions involving the cervicomedullary junction and the cervical spinal cord are unchanged from 10/16/2015 and are in keeping with demyelinating plaques.  Numerous similar lesions are present throughout the thoracic spinal cord, also in keeping with demyelinating plaques, with no prior imaging available for comparison.  Several lesions have associated myelomalacia.  No enhancing lesions to suggest active demyelination.    Heterogeneous 1.7 cm right thyroid lobe nodule.  Thyroid ultrasound is recommended for further evaluation.    Partially imaged 2.7 cm right renal parapelvic cyst has questionably complex appearance with suggestion of enhancing septae.  Renal ultrasound is recommended for better characterization.    This report was flagged in Epic as abnormal.      Electronically signed by: Luis Arguelles MD  Date:    08/07/2018  Time:    16:29         Labs:     Lab Results   Component Value Date    [id number] 7 (L) 07/18/2018    [id number] 15 (L) 11/28/2016     Lab Results   Component Value Date    JCVINDEX 0.17 07/18/2018    JCVANTIBODY Negative 07/18/2018     Lab Results   Component Value Date    ZM7FKQVW 81.7 11/28/2016    ABSOLUTECD3 2375 (H) 11/28/2016    XW3CITXL 23.7 11/28/2016    ABSOLUTECD8 689 11/28/2016    XY9TWDJJ 58.7 (H) 11/28/2016    ABSOLUTECD4 1707 (H) 11/28/2016    LABCD48 2.48 11/28/2016     Lab Results   Component Value Date    WBC 8.84 09/05/2018    RBC 4.70 09/05/2018    HGB 12.2 09/05/2018    HCT 37.5 09/05/2018    MCV 80 (L) 09/05/2018    MCH 26.0 (L) 09/05/2018    MCHC 32.5 09/05/2018    RDW 14.5 09/05/2018     09/05/2018    MPV 11.3 09/05/2018    GRAN 5.6 09/05/2018    GRAN 63.7 09/05/2018    LYMPH 2.2 09/05/2018    LYMPH 25.3 09/05/2018    MONO 0.8 09/05/2018    MONO 9.2 09/05/2018    EOS 0.1 09/05/2018    BASO 0.01 09/05/2018    EOSINOPHIL 1.5 09/05/2018    BASOPHIL 0.1 09/05/2018     Sodium   Date Value Ref Range Status    09/05/2018 142 136 - 145 mmol/L Final     Potassium   Date Value Ref Range Status   09/05/2018 3.6 3.5 - 5.1 mmol/L Final     Comment:     Specimen slightly hemolyzed     Chloride   Date Value Ref Range Status   09/05/2018 105 95 - 110 mmol/L Final     CO2   Date Value Ref Range Status   09/05/2018 23 23 - 29 mmol/L Final     Glucose   Date Value Ref Range Status   09/05/2018 80 70 - 110 mg/dL Final     BUN, Bld   Date Value Ref Range Status   09/05/2018 16 6 - 20 mg/dL Final     Creatinine   Date Value Ref Range Status   09/05/2018 0.9 0.5 - 1.4 mg/dL Final     Calcium   Date Value Ref Range Status   09/05/2018 10.3 8.7 - 10.5 mg/dL Final     Total Protein   Date Value Ref Range Status   09/05/2018 8.3 6.0 - 8.4 g/dL Final     Albumin   Date Value Ref Range Status   09/05/2018 3.4 (L) 3.5 - 5.2 g/dL Final     Total Bilirubin   Date Value Ref Range Status   09/05/2018 0.4 0.1 - 1.0 mg/dL Final     Comment:     For infants and newborns, interpretation of results should be based  on gestational age, weight and in agreement with clinical  observations.  Premature Infant recommended reference ranges:  Up to 24 hours.............<8.0 mg/dL  Up to 48 hours............<12.0 mg/dL  3-5 days..................<15.0 mg/dL  6-29 days.................<15.0 mg/dL       Alkaline Phosphatase   Date Value Ref Range Status   09/05/2018 92 55 - 135 U/L Final     AST   Date Value Ref Range Status   09/05/2018 16 10 - 40 U/L Final     ALT   Date Value Ref Range Status   09/05/2018 8 (L) 10 - 44 U/L Final     Anion Gap   Date Value Ref Range Status   09/05/2018 14 8 - 16 mmol/L Final     eGFR if    Date Value Ref Range Status   09/05/2018 >60 >60 mL/min/1.73 m^2 Final     eGFR if non    Date Value Ref Range Status   09/05/2018 >60 >60 mL/min/1.73 m^2 Final     Comment:     Calculation used to obtain the estimated glomerular filtration  rate (eGFR) is the CKD-EPI equation.                     Diagnosis/Assessment/Plan:    1. Multiple Sclerosis  · Assessment:Patient's mobility is significantly improved today. She was able to ambulate with U-step walker today and assist of one(will order U-step). Bilateral LE's have improved strength, I highly encouraged PT and OT today. Will plan to assess timed walk next visit.   · Imaging:Will plan for 6 month interval MRI on Ocrevus in January prior to next dosing  · Disease Modifying Therapies: Ocrevus and high dose Vit D3. Month 5 labs ordered and scheduled today. The patient was counseled about the risks associated with immune suppressive therapy, including a higher risk of serious infections and malignancy, as well as the importance of avoiding all live virus vaccines while on immune suppressive medication. She is in process of receiving appropriate vaccinations with ID.       2. MS Symptom Assessment / Management  · Gait Disturbance: encouraged PT HH for now, but would like to see her transition to outpatient when she is able  ·   · Hand Dysfunction: provided her with link to obtain theraputty. Encouraged OT HH for now but again would like to see her transition to outpatient when she is able   · Adaptive needs: Patient needs U-step, transfer tub bench, dressing adaptive equipment(provided links for dressing equipment and transfer tub bench today)   Patient requires a U-Step walker for home use because of significant mobility impairments caused by multiple sclerosis that interfere with bathing, toileting, grooming and feeding. The condition prevents patient from completing these MRADLs safely and in a timely manner. Patients mobility limitations cannot be sufficiently resolved by use of a cane or standard walker as the patient is unable to support him/herself safely using these devices.  The patient can safely use the U-Step and is willing to use it on a regular basis, which will significantly improve the patients ability to participate in  MRADLs    Over 50% of this 40 minute visit was spent in direct face to face counseling of the patient and her  about MS, DMT considerations, and MS symptom management.     Follow-up in about 4 months (around 1/7/2019) for follow up with Dr. Zimmer.  Patient agreed to POC today.    Attending, Dr. Zimmer, was available during today's encounter. Any change to plan along with cosign to appear in the EMR.     Carlota Duran PA-C  [facility]      Multiple sclerosis  -     CBC auto differential; Future; Expected date: 01/07/2019  -     Rituxan Sensitivity; Future; Expected date: 01/01/2019  -     MRI Brain Demyelinating W W/O Contrast; Future; Expected date: 01/01/2019  -     WALKER FOR HOME USE    Encounter for long-term (current) use of high-risk medication  -     CBC auto differential; Future; Expected date: 01/07/2019  -     Rituxan Sensitivity; Future; Expected date: 01/01/2019    Neurologic gait dysfunction  -     WALKER FOR HOME USE    Impaired ambulation  -     WALKER FOR HOME USE

## 2018-09-07 NOTE — Clinical Note
FYI-MRI and month 5 labs scheduled for January along with visit with Dr. NITHYA Ruff due in February 2019

## 2018-09-10 ENCOUNTER — TELEPHONE (OUTPATIENT)
Dept: PSYCHIATRY | Facility: CLINIC | Age: 58
End: 2018-09-10

## 2018-09-10 PROBLEM — D84.9 IMMUNOSUPPRESSION: Status: ACTIVE | Noted: 2018-09-10

## 2018-09-10 NOTE — TELEPHONE ENCOUNTER
Received referral from provider to locate vendor for pt's U-Step walker. Reviewed pt's insurance, then faxed the referral to [facility] at [phone].  Notified pt via portal message.

## 2018-09-21 ENCOUNTER — TELEPHONE (OUTPATIENT)
Dept: NEUROLOGY | Facility: CLINIC | Age: 58
End: 2018-09-21

## 2018-09-21 NOTE — TELEPHONE ENCOUNTER
Patient was seen by  3/12/18. Saw  7/18/18 and saw TEENA Duran  9/7/18 and scheduled to see Goran 1/28/19.

## 2018-10-25 ENCOUNTER — TELEPHONE (OUTPATIENT)
Dept: ADMINISTRATIVE | Facility: CLINIC | Age: 58
End: 2018-10-25

## 2018-10-25 NOTE — TELEPHONE ENCOUNTER
Home Health SOC 09/06/2018 - 11/04/218 with [facility] ([place] - Dr. Charles Chinchilla. Patient received SN, PT and OT services.

## 2018-11-07 ENCOUNTER — CLINICAL SUPPORT (OUTPATIENT)
Dept: INFECTIOUS DISEASES | Facility: CLINIC | Age: 58
End: 2018-11-07
Payer: COMMERCIAL

## 2018-11-07 PROCEDURE — 90732 PPSV23 VACC 2 YRS+ SUBQ/IM: CPT | Mod: S$GLB,,, | Performed by: INTERNAL MEDICINE

## 2018-11-07 PROCEDURE — 90471 IMMUNIZATION ADMIN: CPT | Mod: S$GLB,,, | Performed by: INTERNAL MEDICINE

## 2019-01-10 DIAGNOSIS — G35 MULTIPLE SCLEROSIS: Primary | ICD-10-CM

## 2019-01-10 DIAGNOSIS — Z79.899 HIGH RISK MEDICATION USE: ICD-10-CM

## 2019-01-17 ENCOUNTER — PATIENT MESSAGE (OUTPATIENT)
Dept: NEUROLOGY | Facility: CLINIC | Age: 59
End: 2019-01-17

## 2019-01-28 ENCOUNTER — LAB VISIT (OUTPATIENT)
Dept: LAB | Facility: HOSPITAL | Age: 59
End: 2019-01-28
Payer: COMMERCIAL

## 2019-01-28 ENCOUNTER — TELEPHONE (OUTPATIENT)
Dept: PSYCHIATRY | Facility: CLINIC | Age: 59
End: 2019-01-28

## 2019-01-28 ENCOUNTER — OFFICE VISIT (OUTPATIENT)
Dept: NEUROLOGY | Facility: CLINIC | Age: 59
End: 2019-01-28
Payer: COMMERCIAL

## 2019-01-28 VITALS
BODY MASS INDEX: 30.86 KG/M2 | HEART RATE: 84 BPM | DIASTOLIC BLOOD PRESSURE: 88 MMHG | WEIGHT: 158 LBS | SYSTOLIC BLOOD PRESSURE: 130 MMHG

## 2019-01-28 DIAGNOSIS — Z71.89 COUNSELING REGARDING GOALS OF CARE: ICD-10-CM

## 2019-01-28 DIAGNOSIS — Z79.899 HIGH RISK MEDICATION USE: ICD-10-CM

## 2019-01-28 DIAGNOSIS — Z79.899 ENCOUNTER FOR LONG-TERM (CURRENT) USE OF HIGH-RISK MEDICATION: ICD-10-CM

## 2019-01-28 DIAGNOSIS — G47.33 OSA (OBSTRUCTIVE SLEEP APNEA): Primary | ICD-10-CM

## 2019-01-28 DIAGNOSIS — G82.20 PARAPARESIS: ICD-10-CM

## 2019-01-28 DIAGNOSIS — D84.9 IMMUNOSUPPRESSION: ICD-10-CM

## 2019-01-28 DIAGNOSIS — G35 MS (MULTIPLE SCLEROSIS): ICD-10-CM

## 2019-01-28 DIAGNOSIS — R26.2 IMPAIRED AMBULATION: ICD-10-CM

## 2019-01-28 DIAGNOSIS — G35 MULTIPLE SCLEROSIS: ICD-10-CM

## 2019-01-28 LAB
BASOPHILS # BLD AUTO: 0.05 K/UL
BASOPHILS NFR BLD: 0.6 %
DIFFERENTIAL METHOD: ABNORMAL
EOSINOPHIL # BLD AUTO: 0.1 K/UL
EOSINOPHIL NFR BLD: 1.4 %
ERYTHROCYTE [DISTWIDTH] IN BLOOD BY AUTOMATED COUNT: 14 %
HBV CORE AB SERPL QL IA: NEGATIVE
HBV SURFACE AB SER-ACNC: NEGATIVE M[IU]/ML
HBV SURFACE AG SERPL QL IA: NEGATIVE
HCT VFR BLD AUTO: 47.1 %
HGB BLD-MCNC: 14.7 G/DL
IMM GRANULOCYTES # BLD AUTO: 0.03 K/UL
IMM GRANULOCYTES NFR BLD AUTO: 0.3 %
LYMPHOCYTES # BLD AUTO: 2.2 K/UL
LYMPHOCYTES NFR BLD: 25.8 %
MCH RBC QN AUTO: 25.1 PG
MCHC RBC AUTO-ENTMCNC: 31.2 G/DL
MCV RBC AUTO: 80 FL
MONOCYTES # BLD AUTO: 0.5 K/UL
MONOCYTES NFR BLD: 5.4 %
NEUTROPHILS # BLD AUTO: 5.7 K/UL
NEUTROPHILS NFR BLD: 66.5 %
NRBC BLD-RTO: 0 /100 WBC
PLATELET # BLD AUTO: 363 K/UL
PMV BLD AUTO: 11.5 FL
RBC # BLD AUTO: 5.86 M/UL
WBC # BLD AUTO: 8.63 K/UL

## 2019-01-28 PROCEDURE — 99215 PR OFFICE/OUTPT VISIT, EST, LEVL V, 40-54 MIN: ICD-10-PCS | Mod: S$GLB,,, | Performed by: PSYCHIATRY & NEUROLOGY

## 2019-01-28 PROCEDURE — 3075F SYST BP GE 130 - 139MM HG: CPT | Mod: CPTII,S$GLB,, | Performed by: PSYCHIATRY & NEUROLOGY

## 2019-01-28 PROCEDURE — 86356 MONONUCLEAR CELL ANTIGEN: CPT

## 2019-01-28 PROCEDURE — 86704 HEP B CORE ANTIBODY TOTAL: CPT

## 2019-01-28 PROCEDURE — 3008F PR BODY MASS INDEX (BMI) DOCUMENTED: ICD-10-PCS | Mod: CPTII,S$GLB,, | Performed by: PSYCHIATRY & NEUROLOGY

## 2019-01-28 PROCEDURE — 3079F PR MOST RECENT DIASTOLIC BLOOD PRESSURE 80-89 MM HG: ICD-10-PCS | Mod: CPTII,S$GLB,, | Performed by: PSYCHIATRY & NEUROLOGY

## 2019-01-28 PROCEDURE — 86706 HEP B SURFACE ANTIBODY: CPT

## 2019-01-28 PROCEDURE — 3075F PR MOST RECENT SYSTOLIC BLOOD PRESS GE 130-139MM HG: ICD-10-PCS | Mod: CPTII,S$GLB,, | Performed by: PSYCHIATRY & NEUROLOGY

## 2019-01-28 PROCEDURE — 99215 OFFICE O/P EST HI 40 MIN: CPT | Mod: S$GLB,,, | Performed by: PSYCHIATRY & NEUROLOGY

## 2019-01-28 PROCEDURE — 87340 HEPATITIS B SURFACE AG IA: CPT

## 2019-01-28 PROCEDURE — 36415 COLL VENOUS BLD VENIPUNCTURE: CPT

## 2019-01-28 PROCEDURE — 3008F BODY MASS INDEX DOCD: CPT | Mod: CPTII,S$GLB,, | Performed by: PSYCHIATRY & NEUROLOGY

## 2019-01-28 PROCEDURE — 3079F DIAST BP 80-89 MM HG: CPT | Mod: CPTII,S$GLB,, | Performed by: PSYCHIATRY & NEUROLOGY

## 2019-01-28 PROCEDURE — 99999 PR PBB SHADOW E&M-EST. PATIENT-LVL III: ICD-10-PCS | Mod: PBBFAC,,, | Performed by: PSYCHIATRY & NEUROLOGY

## 2019-01-28 PROCEDURE — 99999 PR PBB SHADOW E&M-EST. PATIENT-LVL III: CPT | Mod: PBBFAC,,, | Performed by: PSYCHIATRY & NEUROLOGY

## 2019-01-28 PROCEDURE — 85025 COMPLETE CBC W/AUTO DIFF WBC: CPT

## 2019-01-28 NOTE — Clinical Note
Looks like they were denied for U-step; I think documentation was ok; have BCBS primary and Medicare secondary;  Any suggestions?

## 2019-01-28 NOTE — TELEPHONE ENCOUNTER
SW received referral from Dr. Zimmer to look into pt's U-Step walker that was never received.  [facility] and there was an error in processing.  Thi will work on this now.

## 2019-01-28 NOTE — PATIENT INSTRUCTIONS
Tub transfer bench;   Take vitamin D3 10,000 IU/day , [employer] gel caps (get 5,000 IU caps and take 2 daily)

## 2019-01-28 NOTE — PROGRESS NOTES
Subjective:       Patient ID: Deb Figueroa is a 58 y.o. female who presents today with her  for a routine clinic visit for MS.      MS HPI:  · DMT: Ocrevus(8/14 & 8/28)  · Side effects from DMT? No  · Taking vitamin D3 as recommended? Yes    She's not walking.  Started outpatient but did not do it for long; there are lots of stresses in her life, her  reports;   She feels depressed b/c of serious family stressors; on Lexapro 20mg/day and Abilify 5mg/day;   No longer taking Myrbetric; bladder control is better; she still has to wear pull-ups;  No recent UTIs (but did have UTI last year that led to hospitalization).  Had recent lithotripsy for UTI.  Plan to see Dr. العلي in [facility] soon (urology).    She did not receive U-step walker; states insurance did not pay;      SOCIAL HISTORY  Social History     Tobacco Use    Smoking status: Never Smoker    Smokeless tobacco: Never Used   Substance Use Topics    Alcohol use: No    Drug use: No     Living arrangements - the patient lives with their family.  Employment: no;     MS ROS:  · Fatigue: Yes -   · Sleep Disturbance: Yes - takes Ativan prescribed by PCP MD.  Has been on this for a long time; does snore; never had a sleep study;   · Bladder Dysfunction: Yes - improved as in HPI;    · Bowel Dysfunction: No  · Spasticity: No  · Visual Symptoms: No  · Cognitive: Yes - stable  · Mood Disorder: Yes - depressed;   · Gait Disturbance: yes; not walking much right now;   · Falls: No  · Hand Dysfunction: Yes - some fine motor issues in hands  · Pain: No  · Sexual Dysfunction: Not Assessed  · Skin Breakdown: No  · Tremors: No  · Dysphagia:  No  · Dysarthria:  Yes - some;    · Heat sensitivity:  Yes - no cooling equipment;   · Any un-met adaptive needs? Yes, need tub transfer bench  · Copay Assist?  Yes -   · Clinical Trial candidate? Yes -         Objective:      In scooter;    25 foot timed walk: 1 minute and 40 seconds with U-step and max assist of another  person;  She has not walked the 25 foot distance in our clinic in quite some time;       Imaging:         Results for orders placed during the hospital encounter of 08/07/18   MRI Brain Demyelinating W W/O Contrast    Impression Findings in the supratentorial and infratentorial white matter which are typical for multiple sclerosis, with a moderate to severe burden of plaques.  Accounting for differences in technique, no new or enhancing lesions to suggest active disease.    Sinus disease.      Electronically signed by: Luis Arguelles MD  Date:    08/07/2018  Time:    16:29     Results for orders placed during the hospital encounter of 08/07/18   MRI Cervical Spine Demyelinating W W/O Contrast    Impression Numerous short-segment T2 hyperintense lesions involving the cervicomedullary junction and the cervical spinal cord are unchanged from 10/16/2015 and are in keeping with demyelinating plaques.  Numerous similar lesions are present throughout the thoracic spinal cord, also in keeping with demyelinating plaques, with no prior imaging available for comparison.  Several lesions have associated myelomalacia.  No enhancing lesions to suggest active demyelination.    Heterogeneous 1.7 cm right thyroid lobe nodule.  Thyroid ultrasound is recommended for further evaluation.    Partially imaged 2.7 cm right renal parapelvic cyst has questionably complex appearance with suggestion of enhancing septae.  Renal ultrasound is recommended for better characterization.    This report was flagged in Epic as abnormal.      Electronically signed by: Luis Arguelles MD  Date:    08/07/2018  Time:    16:29     Results for orders placed during the hospital encounter of 08/07/18   MRI Thoracic Spine Demyelinating W W/O Contrast    Impression Numerous short-segment T2 hyperintense lesions involving the cervicomedullary junction and the cervical spinal cord are unchanged from 10/16/2015 and are in keeping with demyelinating plaques.  Numerous  similar lesions are present throughout the thoracic spinal cord, also in keeping with demyelinating plaques, with no prior imaging available for comparison.  Several lesions have associated myelomalacia.  No enhancing lesions to suggest active demyelination.    Heterogeneous 1.7 cm right thyroid lobe nodule.  Thyroid ultrasound is recommended for further evaluation.    Partially imaged 2.7 cm right renal parapelvic cyst has questionably complex appearance with suggestion of enhancing septae.  Renal ultrasound is recommended for better characterization.    This report was flagged in Epic as abnormal.      Electronically signed by: Luis Arguelles MD  Date:    08/07/2018  Time:    16:29         Labs:     Lab Results   Component Value Date    [id number] 7 (L) 07/18/2018    [id number] 15 (L) 11/28/2016     Lab Results   Component Value Date    JCVINDEX 0.17 07/18/2018    JCVANTIBODY Negative 07/18/2018     Lab Results   Component Value Date    LR1JLXOZ 81.7 11/28/2016    ABSOLUTECD3 2375 (H) 11/28/2016    IV0SBIWZ 23.7 11/28/2016    ABSOLUTECD8 689 11/28/2016    NK1UKMWJ 58.7 (H) 11/28/2016    ABSOLUTECD4 1707 (H) 11/28/2016    LABCD48 2.48 11/28/2016     Lab Results   Component Value Date    WBC 8.63 01/28/2019    RBC 5.86 (H) 01/28/2019    HGB 14.7 01/28/2019    HCT 47.1 01/28/2019    MCV 80 (L) 01/28/2019    MCH 25.1 (L) 01/28/2019    MCHC 31.2 (L) 01/28/2019    RDW 14.0 01/28/2019     (H) 01/28/2019    MPV 11.5 01/28/2019    GRAN 5.7 01/28/2019    GRAN 66.5 01/28/2019    LYMPH 2.2 01/28/2019    LYMPH 25.8 01/28/2019    MONO 0.5 01/28/2019    MONO 5.4 01/28/2019    EOS 0.1 01/28/2019    BASO 0.05 01/28/2019    EOSINOPHIL 1.4 01/28/2019    BASOPHIL 0.6 01/28/2019     Sodium   Date Value Ref Range Status   09/05/2018 142 136 - 145 mmol/L Final     Potassium   Date Value Ref Range Status   09/05/2018 3.6 3.5 - 5.1 mmol/L Final     Comment:     Specimen slightly hemolyzed     Chloride   Date Value Ref Range Status    09/05/2018 105 95 - 110 mmol/L Final     CO2   Date Value Ref Range Status   09/05/2018 23 23 - 29 mmol/L Final     Glucose   Date Value Ref Range Status   09/05/2018 80 70 - 110 mg/dL Final     BUN, Bld   Date Value Ref Range Status   09/05/2018 16 6 - 20 mg/dL Final     Creatinine   Date Value Ref Range Status   09/05/2018 0.9 0.5 - 1.4 mg/dL Final     Calcium   Date Value Ref Range Status   09/05/2018 10.3 8.7 - 10.5 mg/dL Final     Total Protein   Date Value Ref Range Status   09/05/2018 8.3 6.0 - 8.4 g/dL Final     Albumin   Date Value Ref Range Status   09/05/2018 3.4 (L) 3.5 - 5.2 g/dL Final     Total Bilirubin   Date Value Ref Range Status   09/05/2018 0.4 0.1 - 1.0 mg/dL Final     Comment:     For infants and newborns, interpretation of results should be based  on gestational age, weight and in agreement with clinical  observations.  Premature Infant recommended reference ranges:  Up to 24 hours.............<8.0 mg/dL  Up to 48 hours............<12.0 mg/dL  3-5 days..................<15.0 mg/dL  6-29 days.................<15.0 mg/dL       Alkaline Phosphatase   Date Value Ref Range Status   09/05/2018 92 55 - 135 U/L Final     AST   Date Value Ref Range Status   09/05/2018 16 10 - 40 U/L Final     ALT   Date Value Ref Range Status   09/05/2018 8 (L) 10 - 44 U/L Final     Anion Gap   Date Value Ref Range Status   09/05/2018 14 8 - 16 mmol/L Final     eGFR if    Date Value Ref Range Status   09/05/2018 >60 >60 mL/min/1.73 m^2 Final     eGFR if non    Date Value Ref Range Status   09/05/2018 >60 >60 mL/min/1.73 m^2 Final     Comment:     Calculation used to obtain the estimated glomerular filtration  rate (eGFR) is the CKD-EPI equation.                    Diagnosis/Assessment/Plan:    1. Multiple Sclerosis  · Assessment:Patient's mobility is improved, but she still has advanced disability;   · Imaging: she missed her 6 mo MRI; given clinical improvement, will defer this MRI  and will plan for MRI in 6 mo from now;   Disease Modifying Therapies: Continue Ocrevus; next dose in Feb; HepB labs drawn today and are pending; CBC okay;      2. MS Symptom Assessment / Management  · Gait Disturbance: pt will restart outpatient PT locally   · Sleep: will order sleep study; some concern for XUAN;   · Mood: she is actively depressed; already on 2 meds;  Advised that she follow up with PCP soon to get recommendations on local psychiatry;   · Adaptive needs: Patient needs U-step:   Patient requires a U-Step walker for home use because of significant mobility impairments caused by multiple sclerosis that interfere with bathing, toileting, grooming and feeding. The condition prevents patient from completing these MRADLs safely and in a timely manner. Patients mobility limitations cannot be sufficiently resolved by use of a cane or standard walker as the patient is unable to support him/herself safely using these devices.  The patient can safely use the U-Step and is willing to use it on a regular basis, which will significantly improve the patients ability to participate in MRADLs    Over 50% of this 40 minute visit was spent in direct face to face counseling of the patient and her  about MS, DMT considerations, and MS symptom management.     F/u 4 mo with Carlota Duran PA-C    Problem List Items Addressed This Visit        Unprioritized    MS (multiple sclerosis)    Relevant Orders    Polysomnogram (CPAP will be added if patient meets diagnostic criteria.)    Immunosuppression    High risk medication use      Other Visit Diagnoses     XUAN (obstructive sleep apnea)    -  Primary    Relevant Orders    Polysomnogram (CPAP will be added if patient meets diagnostic criteria.)    Counseling regarding goals of care        Impaired ambulation        Paraparesis

## 2019-02-01 LAB — CD20 CELLS NFR SPEC: NORMAL %

## 2019-02-05 ENCOUNTER — PATIENT MESSAGE (OUTPATIENT)
Dept: NEUROLOGY | Facility: CLINIC | Age: 59
End: 2019-02-05

## 2019-02-07 ENCOUNTER — TELEPHONE (OUTPATIENT)
Dept: INFECTIOUS DISEASES | Facility: CLINIC | Age: 59
End: 2019-02-07

## 2019-02-07 ENCOUNTER — TELEPHONE (OUTPATIENT)
Dept: INFECTIOUS DISEASES | Facility: HOSPITAL | Age: 59
End: 2019-02-07

## 2019-02-07 ENCOUNTER — PATIENT MESSAGE (OUTPATIENT)
Dept: NEUROLOGY | Facility: CLINIC | Age: 59
End: 2019-02-07

## 2019-02-07 NOTE — TELEPHONE ENCOUNTER
Received voicemail requesting to schedule infusion.  Medication given in chemo infusion room and not ID.  Returned patients call, left  with number to Chemo Infusion room.

## 2019-02-12 ENCOUNTER — INFUSION (OUTPATIENT)
Dept: INFUSION THERAPY | Facility: HOSPITAL | Age: 59
End: 2019-02-12
Attending: PSYCHIATRY & NEUROLOGY
Payer: COMMERCIAL

## 2019-02-12 ENCOUNTER — TELEPHONE (OUTPATIENT)
Dept: NEUROLOGY | Facility: CLINIC | Age: 59
End: 2019-02-12

## 2019-02-12 VITALS
SYSTOLIC BLOOD PRESSURE: 133 MMHG | OXYGEN SATURATION: 95 % | DIASTOLIC BLOOD PRESSURE: 92 MMHG | HEART RATE: 81 BPM | HEIGHT: 60 IN | RESPIRATION RATE: 18 BRPM | BODY MASS INDEX: 32.02 KG/M2 | WEIGHT: 163.13 LBS | TEMPERATURE: 98 F

## 2019-02-12 DIAGNOSIS — G35 MS (MULTIPLE SCLEROSIS): Primary | ICD-10-CM

## 2019-02-12 PROCEDURE — 96366 THER/PROPH/DIAG IV INF ADDON: CPT

## 2019-02-12 PROCEDURE — 96375 TX/PRO/DX INJ NEW DRUG ADDON: CPT

## 2019-02-12 PROCEDURE — 96367 TX/PROPH/DG ADDL SEQ IV INF: CPT

## 2019-02-12 PROCEDURE — S0028 INJECTION, FAMOTIDINE, 20 MG: HCPCS | Performed by: PSYCHIATRY & NEUROLOGY

## 2019-02-12 PROCEDURE — 25000003 PHARM REV CODE 250: Performed by: PSYCHIATRY & NEUROLOGY

## 2019-02-12 PROCEDURE — 96365 THER/PROPH/DIAG IV INF INIT: CPT

## 2019-02-12 PROCEDURE — 63600175 PHARM REV CODE 636 W HCPCS: Performed by: PSYCHIATRY & NEUROLOGY

## 2019-02-12 RX ORDER — SODIUM CHLORIDE 0.9 % (FLUSH) 0.9 %
10 SYRINGE (ML) INJECTION
Status: CANCELLED | OUTPATIENT
Start: 2019-02-12

## 2019-02-12 RX ORDER — SODIUM CHLORIDE 0.9 % (FLUSH) 0.9 %
10 SYRINGE (ML) INJECTION
Status: DISCONTINUED | OUTPATIENT
Start: 2019-02-12 | End: 2019-02-12 | Stop reason: HOSPADM

## 2019-02-12 RX ORDER — DIPHENHYDRAMINE HYDROCHLORIDE 50 MG/ML
25 INJECTION INTRAMUSCULAR; INTRAVENOUS
Status: COMPLETED | OUTPATIENT
Start: 2019-02-12 | End: 2019-02-12

## 2019-02-12 RX ORDER — FAMOTIDINE 10 MG/ML
20 INJECTION INTRAVENOUS
Status: COMPLETED | OUTPATIENT
Start: 2019-02-12 | End: 2019-02-12

## 2019-02-12 RX ORDER — HEPARIN 100 UNIT/ML
100 SYRINGE INTRAVENOUS
Status: DISCONTINUED | OUTPATIENT
Start: 2019-02-12 | End: 2019-02-12 | Stop reason: HOSPADM

## 2019-02-12 RX ORDER — HEPARIN 100 UNIT/ML
100 SYRINGE INTRAVENOUS
Status: CANCELLED | OUTPATIENT
Start: 2019-02-12

## 2019-02-12 RX ORDER — ACETAMINOPHEN 500 MG
1000 TABLET ORAL
Status: COMPLETED | OUTPATIENT
Start: 2019-02-12 | End: 2019-02-12

## 2019-02-12 RX ORDER — FAMOTIDINE 10 MG/ML
20 INJECTION INTRAVENOUS
Status: CANCELLED | OUTPATIENT
Start: 2019-02-12

## 2019-02-12 RX ORDER — ACETAMINOPHEN 500 MG
1000 TABLET ORAL
Status: CANCELLED | OUTPATIENT
Start: 2019-02-12

## 2019-02-12 RX ADMIN — OCRELIZUMAB 600 MG: 300 INJECTION INTRAVENOUS at 11:02

## 2019-02-12 RX ADMIN — DIPHENHYDRAMINE HYDROCHLORIDE 25 MG: 50 INJECTION, SOLUTION INTRAMUSCULAR; INTRAVENOUS at 01:02

## 2019-02-12 RX ADMIN — FAMOTIDINE 20 MG: 10 INJECTION, SOLUTION INTRAVENOUS at 09:02

## 2019-02-12 RX ADMIN — HYDROCORTISONE SODIUM SUCCINATE 100 MG: 100 INJECTION, POWDER, FOR SOLUTION INTRAMUSCULAR; INTRAVENOUS at 01:02

## 2019-02-12 RX ADMIN — DEXTROSE: 50 INJECTION, SOLUTION INTRAVENOUS at 10:02

## 2019-02-12 RX ADMIN — DIPHENHYDRAMINE HYDROCHLORIDE 50 MG: 50 INJECTION, SOLUTION INTRAMUSCULAR; INTRAVENOUS at 09:02

## 2019-02-12 RX ADMIN — ACETAMINOPHEN 1000 MG: 500 TABLET, FILM COATED ORAL at 09:02

## 2019-02-12 RX ADMIN — SODIUM CHLORIDE: 0.9 INJECTION, SOLUTION INTRAVENOUS at 09:02

## 2019-02-12 NOTE — PLAN OF CARE
Problem: Mobility Impairment (Multiple Sclerosis)  Goal: Safety with Mobility  Outcome: Ongoing (interventions implemented as appropriate)  Labs , hx, and medications reviewed. Assessment completed. Discussed plan of care with patient. Patient in agreement. VSS.  Chair reclined and warm blanket and snack offered. Will cont to monitor

## 2019-02-12 NOTE — PLAN OF CARE
Problem: Adult Inpatient Plan of Care  Goal: Plan of Care Review  Outcome: Ongoing (interventions implemented as appropriate)  Pt tolerated ocrevus with mild adverse effects. Sore throat with itching relieved after benadryl/solu cortef.  VSS. Verbalized understanding of RTC date. DC with family via wheelchair. Declined the 1 hour post obs period.  Instructed on s/s to report to MD and /or ED.

## 2019-03-06 ENCOUNTER — PATIENT MESSAGE (OUTPATIENT)
Dept: NEUROLOGY | Facility: CLINIC | Age: 59
End: 2019-03-06

## 2019-03-13 ENCOUNTER — PATIENT MESSAGE (OUTPATIENT)
Dept: NEUROLOGY | Facility: CLINIC | Age: 59
End: 2019-03-13

## 2019-03-13 DIAGNOSIS — G35 MULTIPLE SCLEROSIS: Primary | ICD-10-CM

## 2019-03-13 DIAGNOSIS — Z79.899 HIGH RISK MEDICATION USE: ICD-10-CM

## 2019-03-19 ENCOUNTER — LAB VISIT (OUTPATIENT)
Dept: LAB | Facility: HOSPITAL | Age: 59
End: 2019-03-19
Attending: PHYSICIAN ASSISTANT
Payer: COMMERCIAL

## 2019-03-19 DIAGNOSIS — Z79.899 HIGH RISK MEDICATION USE: ICD-10-CM

## 2019-03-19 DIAGNOSIS — G35 MULTIPLE SCLEROSIS: ICD-10-CM

## 2019-03-19 LAB
BASOPHILS # BLD AUTO: 0.05 K/UL
BASOPHILS NFR BLD: 0.6 %
DIFFERENTIAL METHOD: ABNORMAL
EOSINOPHIL # BLD AUTO: 0.2 K/UL
EOSINOPHIL NFR BLD: 2.8 %
ERYTHROCYTE [DISTWIDTH] IN BLOOD BY AUTOMATED COUNT: 14.9 %
HCT VFR BLD AUTO: 46.5 %
HGB BLD-MCNC: 14.9 G/DL
LYMPHOCYTES # BLD AUTO: 1.8 K/UL
LYMPHOCYTES NFR BLD: 22.2 %
MCH RBC QN AUTO: 26.1 PG
MCHC RBC AUTO-ENTMCNC: 32 G/DL
MCV RBC AUTO: 81 FL
MONOCYTES # BLD AUTO: 0.3 K/UL
MONOCYTES NFR BLD: 4 %
NEUTROPHILS # BLD AUTO: 5.6 K/UL
NEUTROPHILS NFR BLD: 70.4 %
PLATELET # BLD AUTO: 326 K/UL
PMV BLD AUTO: 11.1 FL
RBC # BLD AUTO: 5.71 M/UL
WBC # BLD AUTO: 7.93 K/UL

## 2019-03-19 PROCEDURE — 85025 COMPLETE CBC W/AUTO DIFF WBC: CPT

## 2019-03-19 PROCEDURE — 36415 COLL VENOUS BLD VENIPUNCTURE: CPT

## 2019-06-28 ENCOUNTER — TELEPHONE (OUTPATIENT)
Dept: NEUROLOGY | Facility: CLINIC | Age: 59
End: 2019-06-28

## 2019-06-28 DIAGNOSIS — D84.9 IMMUNOSUPPRESSION: ICD-10-CM

## 2019-06-28 DIAGNOSIS — G35 MULTIPLE SCLEROSIS: Primary | ICD-10-CM

## 2019-06-28 NOTE — LETTER
[facility]  [address]  [facility] [address]  Phone: [phone]  Fax: [phone]       July 18, 2019    Dear Deb,    We have tried to reach you, unsuccessfully, several times over the last couple weeks to discuss scheduling lab work needed to continue with [facility]. Please call or email ([facility]) this office at your earliest convenience.  We can be reached at [phone].    Thanks,        Ruby Saini RN

## 2019-07-24 ENCOUNTER — PATIENT MESSAGE (OUTPATIENT)
Dept: NEUROLOGY | Facility: CLINIC | Age: 59
End: 2019-07-24

## 2019-09-26 ENCOUNTER — TELEPHONE (OUTPATIENT)
Dept: NEUROLOGY | Facility: CLINIC | Age: 59
End: 2019-09-26

## 2019-09-26 NOTE — TELEPHONE ENCOUNTER
----- Message from Blanca Carmona RN sent at 9/25/2019  2:16 PM CDT -----  Contact: Sukhi ([employer]): [phone] ext [phone]      ----- Message -----  From: Esau Roldan  Sent: 9/25/2019   1:57 PM CDT  To: [facility] Staff    Sukhi called to speak with someone in the clinic re the pt     Please contact Sukhi ([employer]): [phone] ext [phone]

## 2020-02-04 RX ORDER — OXCARBAZEPINE 300 MG/1
300 TABLET, FILM COATED ORAL 2 TIMES DAILY
Qty: 60 TABLET | Refills: 5 | Status: SHIPPED | OUTPATIENT
Start: 2020-02-04 | End: 2021-04-05

## 2020-03-06 ENCOUNTER — HOSPITAL ENCOUNTER (OUTPATIENT)
Facility: HOSPITAL | Age: 60
Discharge: HOME | End: 2020-03-09
Attending: HOSPITALIST | Admitting: HOSPITALIST
Payer: COMMERCIAL

## 2020-03-06 DIAGNOSIS — F32.A DEPRESSION, UNSPECIFIED DEPRESSION TYPE: ICD-10-CM

## 2020-03-06 DIAGNOSIS — G35 MULTIPLE SCLEROSIS WITH ACUTE NEUROLOGIC EVENT: ICD-10-CM

## 2020-03-06 DIAGNOSIS — E78.2 MIXED HYPERLIPIDEMIA: ICD-10-CM

## 2020-03-06 DIAGNOSIS — R29.898 LOWER EXTREMITY WEAKNESS: ICD-10-CM

## 2020-03-06 DIAGNOSIS — F17.200 TOBACCO USE DISORDER: ICD-10-CM

## 2020-03-06 DIAGNOSIS — I10 ESSENTIAL HYPERTENSION: ICD-10-CM

## 2020-03-06 LAB
25(OH)D3+25(OH)D2 SERPL-MCNC: 20 NG/ML (ref 30–96)
BILIRUB UR QL STRIP: NEGATIVE
CLARITY UR REFRACT.AUTO: ABNORMAL
COLOR UR AUTO: YELLOW
GLUCOSE UR QL STRIP: NEGATIVE
HGB UR QL STRIP: NEGATIVE
KETONES UR QL STRIP: NEGATIVE
LEUKOCYTE ESTERASE UR QL STRIP: NEGATIVE
NITRITE UR QL STRIP: NEGATIVE
PH UR STRIP: 6 [PH] (ref 5–8)
PROT UR QL STRIP: NEGATIVE
SP GR UR STRIP: 1.01 (ref 1–1.03)
URN SPEC COLLECT METH UR: ABNORMAL

## 2020-03-06 PROCEDURE — 97530 THERAPEUTIC ACTIVITIES: CPT

## 2020-03-06 PROCEDURE — G0378 HOSPITAL OBSERVATION PER HR: HCPCS

## 2020-03-06 PROCEDURE — 82306 VITAMIN D 25 HYDROXY: CPT

## 2020-03-06 PROCEDURE — 25000003 PHARM REV CODE 250: Performed by: STUDENT IN AN ORGANIZED HEALTH CARE EDUCATION/TRAINING PROGRAM

## 2020-03-06 PROCEDURE — 99220 PR INITIAL OBSERVATION CARE,LEVL III: CPT | Mod: ,,,

## 2020-03-06 PROCEDURE — S4991 NICOTINE PATCH NONLEGEND: HCPCS | Performed by: STUDENT IN AN ORGANIZED HEALTH CARE EDUCATION/TRAINING PROGRAM

## 2020-03-06 PROCEDURE — 97535 SELF CARE MNGMENT TRAINING: CPT

## 2020-03-06 PROCEDURE — 99220 PR INITIAL OBSERVATION CARE,LEVL III: CPT | Mod: ,,, | Performed by: PSYCHIATRY & NEUROLOGY

## 2020-03-06 PROCEDURE — G0379 DIRECT REFER HOSPITAL OBSERV: HCPCS

## 2020-03-06 PROCEDURE — 97165 OT EVAL LOW COMPLEX 30 MIN: CPT

## 2020-03-06 PROCEDURE — 99220 PR INITIAL OBSERVATION CARE,LEVL III: ICD-10-PCS | Mod: ,,,

## 2020-03-06 PROCEDURE — 97161 PT EVAL LOW COMPLEX 20 MIN: CPT

## 2020-03-06 PROCEDURE — 94761 N-INVAS EAR/PLS OXIMETRY MLT: CPT

## 2020-03-06 PROCEDURE — 99220 PR INITIAL OBSERVATION CARE,LEVL III: ICD-10-PCS | Mod: ,,, | Performed by: PSYCHIATRY & NEUROLOGY

## 2020-03-06 PROCEDURE — 81003 URINALYSIS AUTO W/O SCOPE: CPT

## 2020-03-06 PROCEDURE — 36415 COLL VENOUS BLD VENIPUNCTURE: CPT

## 2020-03-06 RX ORDER — IBUPROFEN 200 MG
24 TABLET ORAL
Status: DISCONTINUED | OUTPATIENT
Start: 2020-03-06 | End: 2020-03-09 | Stop reason: HOSPADM

## 2020-03-06 RX ORDER — ACETAMINOPHEN 325 MG/1
650 TABLET ORAL EVERY 8 HOURS PRN
Status: DISCONTINUED | OUTPATIENT
Start: 2020-03-06 | End: 2020-03-09 | Stop reason: HOSPADM

## 2020-03-06 RX ORDER — ROSUVASTATIN CALCIUM 10 MG/1
10 TABLET, COATED ORAL NIGHTLY
Status: DISCONTINUED | OUTPATIENT
Start: 2020-03-06 | End: 2020-03-09 | Stop reason: HOSPADM

## 2020-03-06 RX ORDER — GLUCAGON 1 MG
1 KIT INJECTION
Status: DISCONTINUED | OUTPATIENT
Start: 2020-03-06 | End: 2020-03-09 | Stop reason: HOSPADM

## 2020-03-06 RX ORDER — NICOTINE 7MG/24HR
1 PATCH, TRANSDERMAL 24 HOURS TRANSDERMAL DAILY
Status: DISCONTINUED | OUTPATIENT
Start: 2020-03-06 | End: 2020-03-09 | Stop reason: HOSPADM

## 2020-03-06 RX ORDER — ONDANSETRON 2 MG/ML
4 INJECTION INTRAMUSCULAR; INTRAVENOUS EVERY 8 HOURS PRN
Status: DISCONTINUED | OUTPATIENT
Start: 2020-03-06 | End: 2020-03-09 | Stop reason: HOSPADM

## 2020-03-06 RX ORDER — POLYETHYLENE GLYCOL 3350 17 G/17G
17 POWDER, FOR SOLUTION ORAL DAILY
Status: DISCONTINUED | OUTPATIENT
Start: 2020-03-06 | End: 2020-03-09 | Stop reason: HOSPADM

## 2020-03-06 RX ORDER — ESCITALOPRAM OXALATE 20 MG/1
20 TABLET ORAL NIGHTLY
Status: DISCONTINUED | OUTPATIENT
Start: 2020-03-06 | End: 2020-03-09 | Stop reason: HOSPADM

## 2020-03-06 RX ORDER — AMLODIPINE BESYLATE 5 MG/1
5 TABLET ORAL DAILY
Status: DISCONTINUED | OUTPATIENT
Start: 2020-03-06 | End: 2020-03-09 | Stop reason: HOSPADM

## 2020-03-06 RX ORDER — IBUPROFEN 200 MG
16 TABLET ORAL
Status: DISCONTINUED | OUTPATIENT
Start: 2020-03-06 | End: 2020-03-09 | Stop reason: HOSPADM

## 2020-03-06 RX ORDER — LORAZEPAM 1 MG/1
2 TABLET ORAL NIGHTLY PRN
Status: DISCONTINUED | OUTPATIENT
Start: 2020-03-06 | End: 2020-03-09 | Stop reason: HOSPADM

## 2020-03-06 RX ORDER — PROCHLORPERAZINE MALEATE 10 MG
10 TABLET ORAL EVERY 6 HOURS PRN
Status: DISCONTINUED | OUTPATIENT
Start: 2020-03-06 | End: 2020-03-09 | Stop reason: HOSPADM

## 2020-03-06 RX ADMIN — POLYETHYLENE GLYCOL 3350 17 G: 17 POWDER, FOR SOLUTION ORAL at 08:03

## 2020-03-06 RX ADMIN — ROSUVASTATIN CALCIUM 10 MG: 10 TABLET, FILM COATED ORAL at 08:03

## 2020-03-06 RX ADMIN — NICOTINE 1 PATCH: 7 PATCH TRANSDERMAL at 02:03

## 2020-03-06 RX ADMIN — ESCITALOPRAM OXALATE 20 MG: 20 TABLET ORAL at 08:03

## 2020-03-06 RX ADMIN — AMLODIPINE BESYLATE 5 MG: 5 TABLET ORAL at 08:03

## 2020-03-06 NOTE — HPI
59F with hx MS (pt reports >20 years, pt of Dr. Zimmer) transferred for Neurology evaluation of LE weakness to determine if this represents a flare of her MS. She says that at baseline she has been in a wheelchair for the last few years and can stand when holding onto something for support, but otherwise cannot walk. She reports that 03/05 PM she noticed that she felt weaker and that her thighs felt sore. She has not had any recent worsening of diplopia (chronic, corrected by glasses), dysphagia, dysphonia, dyspnea, chest pain, hand weakness, coordination difficulty, fevers, chills, cough, n/v. Does have chronic diarrhea. She also says that 03/05 she fell 3 times: once when getting standing up to get past a step in her house, and twice from the toilet, when she was transferring to wheelchair. She did not lose consciousness or hit her head. No bowel or bladder incontinence.    At OSH she was noted to not be able to bear weight (no LE strength). Denies any bladder of bowel incontinence. Work up has been negative including CT; only her k was at 3.1 but this was replaced. She usually get Q6 month infusions of ocrelizumab but has missed her last two infusions with her last being in Feb 2019 (she says she just didn't feel like going to the appointments but does report that she usually gets better on it). Otherwise completely stable from a neurologic standpoint. Vitals stable.   none

## 2020-03-06 NOTE — ASSESSMENT & PLAN NOTE
Progressive fatigue, cognitive decline, and newer instability with transfers.  Concern for MS relapse +/- pseudoexacerbation in setting of infection + depression.    -MRI Brain w/ w/o contrast demyelinating protocol  -MRI C, T spine w/ w/o contrast demyelinating protocol  -Would check basic labs/imaging to eval for infection--CBC, urine cx, CXR  -Please consult psychiatry for depression which has prevented her from getting her MS treatment   -Continue escitalopram in the meantime  -Would also check for other causes of myelopathy pending no change on imaging   -Vitamins B12 and E, Heavy metals, Copper, Ceruloplasmin, RPR, TSH, Hgb A1c, SPEP/KATIE  -Encourage patient to work with PT, OT

## 2020-03-06 NOTE — PLAN OF CARE
03/06/20 1034   Post-Acute Status   Post-Acute Authorization Other   Other Status No Post-Acute Service Needs     No identified needs at this time. Sw will continue to follow.    Amy Ybarra LMSW  [facility]  [id number]

## 2020-03-06 NOTE — PT/OT/SLP EVAL
"Occupational Therapy   Evaluation & Discharge    Name: Deb Figueroa  MRN: [id number]  Admitting Diagnosis:  <principal problem not specified>      Recommendations:     Discharge Recommendations: outpatient PT  Discharge Equipment Recommendations:  none  Barriers to discharge:  None    Assessment:     Deb Figueora is a 59 y.o. female with a medical diagnosis of <principal problem not specified>.  She presents with recent MS exacerbation along with 3 falls at home on March 5. Pt reports that she has exacerbations about every 3 months. Pt gets assist from  at home for ADLs & t/fs as needed when she is having a "bad day". Feel that pt is essentially at baseline and is not in need of OT services at this level. Pt would like to resume OPPT. Performance deficits affecting function: impaired endurance, weakness, gait instability, impaired balance, decreased lower extremity function, impaired self care skills, impaired functional mobilty, decreased coordination.      Rehab Prognosis: Good; patient would benefit from acute skilled OT services to address these deficits and reach maximum level of function.       Plan:     Patient to be seen   to address the above listed problems via     Plan of Care Reviewed with: patient, spouse    Subjective     Chief Complaint: LE weakness    Occupational Profile:  Living Environment: Pt lives in a 2SH with B/B on 1st floor and with a tub setup. There is a ramp to enter the house.  Previous level of function: Pt receives assist with dressing, bathing, bed mobility and t/fs as needed depending on the day and her weakness level. Does S/P t/fs with assist. Splits time b/t w/c and bed 50/50.  Equipment Used at Home:  wheelchair, grab bar, bath bench, walker, rolling  Assistance upon Discharge:  (works in the day)    Pain/Comfort:  · Pain Rating 1: 0/10    Patients cultural, spiritual, Sikhism conflicts given the current situation:      Objective:     Communicated with: rn prior to " session.  Patient found supine with   upon OT entry to room.    General Precautions: Standard, fall   Orthopedic Precautions:    Braces:       Occupational Performance:    Bed Mobility:    · Patient completed Rolling/Turning to Right with contact guard assistance  · Patient completed Scooting/Bridging with contact guard assistance  · Patient completed Supine to Sit with maximal assistance  · Patient completed Sit to Supine with moderate assistance    Functional Mobility/Transfers:  · Patient completed Sit <> Stand Transfer with minimum assistance  with  no assistive device   · Functional Mobility: Pt took ~ 3 sidesteps with Min A using a RW.    Activities of Daily Living:  · Upper Body Dressing: stand by assistance  · Lower Body Dressing: moderate assistance to don pull-ups.  · Toileting: total assistance for wiping while pt bridged.    Cognitive/Visual Perceptual:  Cognitive/Psychosocial Skills:     -       Oriented to: Person, Place, Time and Situation   -       Safety awareness/insight to disability: intact     Physical Exam:  Balance:    -       EOB balance SBA/CGA - Fair  Upper Extremity Range of Motion:     -       Right Upper Extremity: WNL  -       Left Upper Extremity: WNL  Upper Extremity Strength:    -       Right Upper Extremity: WNL  -       Left Upper Extremity: WNL    AMPAC 6 Click ADL:  AMPAC Total Score: 21    Treatment & Education:  UE ROM/MMT  Bed mobility training / assessment  Functional mobility assessment  Sit/standing balance assessment  Educated on importance of sitting OOB in bedside chair to promote increased strength, endurance & breathing.  Discussed OT POC / Post-acute plan  Education:    Patient left supine with call button in reach    GOALS:   Multidisciplinary Problems     Occupational Therapy Goals     Not on file          Multidisciplinary Problems (Resolved)        Problem: Occupational Therapy Goal    Goal Priority Disciplines Outcome Interventions   Occupational Therapy Goal    (Resolved)     OT, PT/OT Met                    History:     Past Medical History:   Diagnosis Date    Hypertension     MS (multiple sclerosis)     Nephrolithiasis        Past Surgical History:   Procedure Laterality Date    LITHOTRIPSY  2016    Large renal stone.       Time Tracking:     OT Date of Treatment: 03/06/20  OT Start Time: 0956  OT Stop Time: 1020  OT Total Time (min): 24 min    Billable Minutes:Evaluation 14  Self Care/Home Management 10    ANASTASIIA Terrazas  3/6/2020

## 2020-03-06 NOTE — HPI
58 yo F with PMHx of MS (previously seen in MS Clinic, but has not followed x 1 year) with previous Ocrevus infusions (missed last two), HTN, and depression presents to [facility] due to recent decline in mobility and falls at home.  Neurology consulted due to concern for MS relapse vs pseudoexacerbation vs alternative cause of falls.  Patient's  is at bedside and contributes to history.  Patient has been wheelchair bound but used to be able to transfer fairly well, now has not been able to transfer as well.  She states that it is more like she is losing her balance and unable to regain it than weakness with the legs going out from under her though she is weaker.  Patient's  notes that about a year ago she started to sleep a lot more--for the past 6 months sleeping more like 18 hours per day.  She did not go to appointments and missed her Ocrevus infusions because she didn't feel like she had the energy to make it in.  She has been very tearful lately.  She and  note recent stressors but no recent infectious symptoms that they can think of.  Later on rounds, she confides that her sister's daughter lost custody of her son and that has been causing a lot of family turmoil and giving her a lot of stress.  Is feeling depressed and does not feel that the lexapro has been helping as much lately.  Would be interested in talking to psychiatry.  No other focal neurologic deficits mentioned, but some cognitive trouble with  stating that she repeats herself often throughout the day.

## 2020-03-06 NOTE — ASSESSMENT & PLAN NOTE
History of MS without recent flare; pt of Dr. Zimmer. Has missed Ocrevus infusions for the last year or so but does say it usually improves her weakness and fatigue. Presents with nonfocal findings in the setting of increased fall frequency recently. To be evaluated by Neurology.    Plan  - Fall precautions  - Neurology consult

## 2020-03-06 NOTE — H&P
[facility] Hospital Medicine  History & Physical    Patient Name: Deb Figueroa  MRN: [id number]  Admission Date: 3/6/2020  Attending Physician: Rufino Pardo MD   Primary Care Provider: Jesus Thrasher MD    [facility] Medicine Team: [facility] MED 3 Matt Moon MD     Patient information was obtained from patient, spouse/SO and ER records.     Subjective:     Principal Problem:<principal problem not specified>    Chief Complaint: No chief complaint on file.       HPI: 59F with hx MS (pt reports >20 years, pt of Dr. Zimmer) transferred for Neurology evaluation of LE weakness to determine if this represents a flare of her MS. She says that at baseline she has been in a wheelchair for the last few years and can stand when holding onto something for support, but otherwise cannot walk. She reports that 03/05 PM she noticed that she felt weaker and that her thighs felt sore. She has not had any recent worsening of diplopia (chronic, corrected by glasses), dysphagia, dysphonia, dyspnea, chest pain, hand weakness, coordination difficulty, fevers, chills, cough, n/v. Does have chronic diarrhea. She also says that 03/05 she fell 3 times: once when getting standing up to get past a step in her house, and twice from the toilet, when she was transferring to wheelchair. She did not lose consciousness or hit her head. No bowel or bladder incontinence.    At OSH she was noted to not be able to bear weight (no LE strength). Denies any bladder of bowel incontinence. Work up has been negative including CT; only her k was at 3.1 but this was replaced. She usually get Q6 month infusions of ocrelizumab but has missed her last two infusions with her last being in Feb 2019 (she says she just didn't feel like going to the appointments but does report that she usually gets better on it). Otherwise completely stable from a neurologic standpoint. Vitals stable.    Past Medical History:   Diagnosis Date    Hypertension     MS  (multiple sclerosis)     Nephrolithiasis        Past Surgical History:   Procedure Laterality Date    LITHOTRIPSY  2016    Large renal stone.       Review of patient's allergies indicates:   Allergen Reactions    Vancomycin analogues Itching       No current facility-administered medications on file prior to encounter.      Current Outpatient Medications on File Prior to Encounter   Medication Sig    acetaminophen (TYLENOL) 325 MG tablet Take 2 tablets (650 mg total) by mouth every 8 (eight) hours as needed for Temperature greater than (100).    amlodipine (NORVASC) 5 MG tablet Take 5 mg by mouth once daily.    ARIPiprazole (ABILIFY) 5 MG Tab Take 1 tablet (5 mg total) by mouth once daily.    escitalopram oxalate (LEXAPRO) 20 MG tablet TAKE 1 TABLET (20 MG TOTAL) BY MOUTH EVERY EVENING.    HYDROcodone-acetaminophen (NORCO) 5-325 mg per tablet Take 1 tablet by mouth every 8 (eight) hours as needed for Pain.    lorazepam (ATIVAN) 2 MG Tab Take 1 tablet (2 mg total) by mouth 3 (three) times daily as needed. (Patient taking differently: Take 2 mg by mouth 3 (three) times daily as needed. )    mirabegron (MYRBETRIQ) 25 mg Tb24 ER tablet Take 1 tablet (25 mg total) by mouth once daily.    OXcarbazepine (TRILEPTAL) 300 MG Tab TAKE 1 TABLET (300 MG TOTAL) BY MOUTH 2 (TWO) TIMES DAILY.    rosuvastatin (CRESTOR) 10 MG tablet Take 10 mg by mouth every evening.      Family History     Problem Relation (Age of Onset)    Cancer Father    Hypertension Mother        Tobacco Use    Smoking status: Never Smoker    Smokeless tobacco: Never Used   Substance and Sexual Activity    Alcohol use: No    Drug use: No    Sexual activity: Yes     Partners: Male     Comment:      Review of Systems   Constitutional: Positive for fatigue (chronic). Negative for activity change, appetite change, fever and unexpected weight change.   HENT: Negative for congestion, dental problem, drooling, hearing loss, mouth sores, sore  throat, trouble swallowing and voice change.    Eyes: Negative for visual disturbance.   Respiratory: Negative for cough, choking, chest tightness and shortness of breath.    Cardiovascular: Negative for chest pain, palpitations and leg swelling.   Gastrointestinal: Positive for diarrhea (chronic). Negative for abdominal pain.   Musculoskeletal: Positive for gait problem (falls, LE weakness). Negative for arthralgias.   Skin: Negative for wound.   Neurological: Positive for weakness. Negative for dizziness, tremors, seizures, syncope, facial asymmetry, speech difficulty, light-headedness, numbness and headaches.   Psychiatric/Behavioral: Negative for sleep disturbance.     Objective:     Vital Signs (Most Recent):  Temp: 97.7 °F (36.5 °C) (03/06/20 0342)  Pulse: 78 (03/06/20 0342)  Resp: 16 (03/06/20 0342)  BP: (!) 148/83 (03/06/20 0342)  SpO2: 97 % (03/06/20 0342) Vital Signs (24h Range):  Temp:  [97.7 °F (36.5 °C)-98.6 °F (37 °C)] 97.7 °F (36.5 °C)  Pulse:  [62-79] 78  Resp:  [16-18] 16  SpO2:  [97 %-98 %] 97 %  BP: (140-148)/(78-88) 148/83     Weight: 68 kg (150 lb)  Body mass index is 24.96 kg/m².    Physical Exam   Constitutional: She appears well-developed. No distress.   Appears in no distress   HENT:   Head: Normocephalic and atraumatic.   Right Ear: External ear normal.   Left Ear: External ear normal.   Nose: Nose normal.   Eyes: Pupils are equal, round, and reactive to light. EOM are normal.   Neck: No JVD present. No tracheal deviation present.   Cardiovascular: Normal rate, regular rhythm, normal heart sounds and intact distal pulses.   No murmur heard.  Pulmonary/Chest: Effort normal and breath sounds normal. No stridor.   Abdominal: Soft. Bowel sounds are normal. She exhibits no distension. There is no tenderness. No hernia.   Musculoskeletal: She exhibits no edema.   Normal tone and no muscle wasting appreciated   Neurological: She is alert. No cranial nerve deficit.   Neck anterior/posterior and  lateral flexion 5/5    UE   Shoulder abduction, adduction 5/5 bilaterally  Elbow flexion and extension 5/5 bilaterally  Handgrip 4/5 bilaterally    LE   Hip flexors 2/5 bilaterally   Foot dorsiflexion 4/5 bilaterally   Foot plantarflexion 5/5 bilaterally    CNII-XI WNL   Skin: No rash noted. She is not diaphoretic.   Psychiatric: She has a normal mood and affect. Judgment normal.   Vitals reviewed.         Significant Labs: CBC: No results for input(s): WBC, HGB, HCT, PLT in the last 48 hours.  CMP: No results for input(s): NA, K, CL, CO2, GLU, BUN, CREATININE, CALCIUM, PROT, ALBUMIN, BILITOT, ALKPHOS, AST, ALT, ANIONGAP, EGFRNONAA in the last 48 hours.    Invalid input(s): ESTGFAFRICA  All pertinent labs within the past 24 hours have been reviewed.    Significant Imaging: I have reviewed all pertinent imaging results/findings within the past 24 hours.    Assessment/Plan:     Depression  Cont home escitalopram    Hyperlipidemia  Cont home statin    Hypertension  Cont home amlodipine      MS (multiple sclerosis)  History of MS without recent flare; pt of Dr. Zimmer. Has missed Ocrevus infusions for the last year or so but does say it usually improves her weakness and fatigue. Presents with nonfocal findings in the setting of increased fall frequency recently. To be evaluated by Neurology.    Plan  - Fall precautions  - Neurology consult      VTE Risk Mitigation (From admission, onward)         Ordered     IP VTE LOW RISK PATIENT  Once      03/06/20 0556                   Matt Moon MD  Department of Hospital Medicine   [facility]

## 2020-03-06 NOTE — PLAN OF CARE
Problem: Physical Therapy Goal  Goal: Physical Therapy Goal  Description  Goals to be met by: 3/20/20     Patient will increase functional independence with mobility by performin. Supine to sit with Moderate Assistance.  2. Sit to supine with Minimal Assistance.  3. Sit to stand transfer with Contact Guard Assistance.  4. Bed to chair transfer with Minimal Assistance using Rolling Walker.  5. Gait  x 10 feet with Minimal Assistance using Rolling Walker.   6. Lower extremity exercise program x 20 reps per handout, with assistance as needed.     Outcome: Ongoing, Progressing     PT goals established.

## 2020-03-06 NOTE — PLAN OF CARE
CM met with patient at the bedside to discuss discharge planning assessment. Pt Lives with family and has transportation home at discharge. Pt verified PCP and Pharmacy. CM will continue to follow for discharge needs.        03/06/20 0979   Discharge Assessment   Assessment Type Discharge Planning Assessment   Confirmed/corrected address and phone number on facesheet? Yes   Assessment information obtained from? Patient   Expected Length of Stay (days) 1   Communicated expected length of stay with patient/caregiver yes   Prior to hospitilization cognitive status: Alert/Oriented   Prior to hospitalization functional status: Assistive Equipment   Current Functional Status: Assistive Equipment   Lives With spouse   Able to Return to Prior Arrangements yes   Is patient able to care for self after discharge? Yes   Who are your caregiver(s) and their phone number(s)? Jayesh Figueroa-spouse- [phone]   Patient's perception of discharge disposition home or selfcare   Readmission Within the Last 30 Days no previous admission in last 30 days   Patient currently being followed by outpatient case management? No   Patient currently receives any other outside agency services? No   Equipment Currently Used at Home wheelchair;bath bench   Do you have any problems affording any of your prescribed medications? No   Is the patient taking medications as prescribed? yes   Does the patient have transportation home? Yes   Transportation Anticipated family or friend will provide   Does the patient receive services at the Coumadin Clinic? No   Discharge Plan A Home with family   Discharge Plan B Home Health   DME Needed Upon Discharge  none   Patient/Family in Agreement with Plan yes

## 2020-03-06 NOTE — PROGRESS NOTES
Pt states that her Dr's told her not to take vaccines due to medications she is currently taking.

## 2020-03-06 NOTE — CONSULTS
[facility]  Neurology  Consult Note    Patient Name: Deb Figueroa  MRN: [id number]  Admission Date: 3/6/2020  Hospital Length of Stay: 0 days  Code Status: Full Code   Attending Provider: Rfuino Pardo MD   Consulting Provider: Laura Bravo MD  Primary Care Physician: Jesus Thrasher MD  Principal Problem:<principal problem not specified>    Consults   Subjective:     Chief Complaint:  MS with possible exacerbation     HPI:   58 yo F with PMHx of MS (previously seen in [facility], but has not followed x 1 year) with previous Ocrevus infusions (missed last two), HTN, and depression [place] [facility] [place] decline in mobility and falls at home.  Neurology consulted due to concern for MS relapse vs pseudoexacerbation vs alternative cause of falls.  Patient's  is at bedside and contributes to history.  Patient has been wheelchair bound but used to be able to transfer fairly well, now has not been able to transfer as well.  She states that it is more like she is losing her balance and unable to regain it than weakness with the legs going out from under her though she is weaker.  Patient's  notes that about a year ago she started to sleep a lot more--for the past 6 months sleeping more like 18 hours per day.  She did not go to appointments and missed her Ocrevus infusions because she didn't feel like she had the energy to make it in.  She has been very tearful lately.  She and  note recent stressors but no recent infectious symptoms that they can think of.  Later on rounds, she confides that her sister's daughter lost custody of her son and that has been causing a lot of family turmoil and giving her a lot of stress.  Is feeling depressed and does not feel that the lexapro has been helping as much lately.  Would be interested in talking to psychiatry.  No other focal neurologic deficits mentioned, but some cognitive trouble with  stating that she repeats herself often  throughout the day.     Past Medical History:   Diagnosis Date    Hypertension     MS (multiple sclerosis)     Nephrolithiasis      Past Surgical History:   Procedure Laterality Date    LITHOTRIPSY  2016    Large renal stone.     Review of patient's allergies indicates:   Allergen Reactions    Vancomycin analogues Itching     Current Neurological Medications: Supposed to be on Ocrevus (missed last two infusions)    No current facility-administered medications on file prior to encounter.      Current Outpatient Medications on File Prior to Encounter   Medication Sig    acetaminophen (TYLENOL) 325 MG tablet Take 2 tablets (650 mg total) by mouth every 8 (eight) hours as needed for Temperature greater than (100).    amlodipine (NORVASC) 5 MG tablet Take 5 mg by mouth once daily.    ARIPiprazole (ABILIFY) 5 MG Tab Take 1 tablet (5 mg total) by mouth once daily.    escitalopram oxalate (LEXAPRO) 20 MG tablet TAKE 1 TABLET (20 MG TOTAL) BY MOUTH EVERY EVENING.    HYDROcodone-acetaminophen (NORCO) 5-325 mg per tablet Take 1 tablet by mouth every 8 (eight) hours as needed for Pain.    lorazepam (ATIVAN) 2 MG Tab Take 1 tablet (2 mg total) by mouth 3 (three) times daily as needed. (Patient taking differently: Take 2 mg by mouth 3 (three) times daily as needed. )    mirabegron (MYRBETRIQ) 25 mg Tb24 ER tablet Take 1 tablet (25 mg total) by mouth once daily.    OXcarbazepine (TRILEPTAL) 300 MG Tab TAKE 1 TABLET (300 MG TOTAL) BY MOUTH 2 (TWO) TIMES DAILY.    rosuvastatin (CRESTOR) 10 MG tablet Take 10 mg by mouth every evening.      Family History     Problem Relation (Age of Onset)    Cancer Father    Hypertension Mother        Tobacco Use    Smoking status: Never Smoker    Smokeless tobacco: Never Used   Substance and Sexual Activity    Alcohol use: No    Drug use: No    Sexual activity: Yes     Partners: Male     Comment:      Review of Systems   Constitutional: Positive for fatigue. Negative for  chills and fever.   HENT: Negative for trouble swallowing and voice change.    Eyes: Negative for photophobia and visual disturbance.   Respiratory: Negative for cough and shortness of breath.    Cardiovascular: Negative for palpitations and leg swelling.   Gastrointestinal: Negative for nausea and vomiting.   Musculoskeletal: Positive for gait problem. Negative for neck pain and neck stiffness.   Skin: Negative for rash and wound.   Neurological: Positive for weakness and numbness. Negative for speech difficulty.   Hematological: Negative for adenopathy. Does not bruise/bleed easily.   Psychiatric/Behavioral: Positive for decreased concentration and dysphoric mood.     Objective:     Vital Signs (Most Recent):  Temp: 98 °F (36.7 °C) (03/06/20 1520)  Pulse: 77 (03/06/20 1520)  Resp: 20 (03/06/20 1520)  BP: 118/68 (03/06/20 1520)  SpO2: 95 % (03/06/20 1520) Vital Signs (24h Range):  Temp:  [97.3 °F (36.3 °C)-98.6 °F (37 °C)] 98 °F (36.7 °C)  Pulse:  [62-79] 77  Resp:  [16-20] 20  SpO2:  [94 %-98 %] 95 %  BP: (113-148)/(65-88) 118/68     Weight: 68 kg (150 lb)  Body mass index is 24.96 kg/m².    Physical Exam  General:  Well-developed, well-nourished, nad though intermittently tearful  HEENT:  NCAT, PERRLA, EOMI, oropharyngeal membranes non-erythematous/without exudate  Neck:  Supple, normal ROM without nuchal rigidity  Resp:  Symmetric expansion, no increased wob  CVS:  No LE edema, extremities warm/well-perfused  GI:  Abd soft, non-distended  Neurologic Exam:  Mental Status:  Awake, alert, oriented to self but not at all to date--guesses 1990s.  Speech, thought content appropriate.  Able to spell 'world' forward and backward without error.  Cranial Nerves:  PERRLA, EOMI.  Facial movement intact, symmetric.  Palate raises symmetrically, tongue protrudes midline.  Trapezius, SCM strength 5/5 bilaterally.  Motor:  Normal bulk and tone.  BUE shoulder abduction, biceps/triceps,  strength 5/5.  BLE hip flexors, knee  flexion/extension, plantarflexion/dorsiflexion 5/5.  Sensory:  Intact to light touch at all extremities and face without inattention.  Vibratory sensation diminished past mid-thigh.  Reflexes:  Biceps, brachioradialis, patellar 2+ and symmetric.  No ankle clonus.  Equivocal toe bilaterally.  Coordination:  FNF, DAMIEN, HTS intact with no dysmetria/ataxia/dysdiadochokinesia.  No resting tremor or myoclonus.  Gait:  Deferred 2/2 fall risk.     Significant Labs:   Pending basic labs    Significant Imaging:   No neurologic imaging on current admission    Assessment and Plan:     MS (multiple sclerosis)  Progressive fatigue, cognitive decline, and newer instability with transfers.  Concern for MS relapse +/- pseudoexacerbation in setting of infection + depression.    -MRI Brain w/ w/o contrast demyelinating protocol  -MRI C, T spine w/ w/o contrast demyelinating protocol  -Would check basic labs/imaging to eval for infection--CBC, urine cx, CXR  -Please consult psychiatry for depression which has prevented her from getting her MS treatment   -Continue escitalopram in the meantime  -Would also check for other causes of myelopathy pending no change on imaging   -Vitamins B12 and E, Heavy metals, Copper, Ceruloplasmin, RPR, TSH, Hgb A1c, SPEP/KATIE  -Encourage patient to work with PT, OT      VTE Risk Mitigation (From admission, onward)         Ordered     IP VTE LOW RISK PATIENT  Once      03/06/20 0528              Thank you for your consult. I will follow-up with patient. Please contact us if you have any additional questions.    Laura Bravo MD  Neurology  [facility]

## 2020-03-06 NOTE — NURSING
Pt arrived as direct admit from [facility].  at bedside. Vitals stable. Pt's only complaint is of tingling/numbness to lower extremities and some tingling to hands. Pt in no acute distress. AAOx4 will continue to monitor.

## 2020-03-06 NOTE — PLAN OF CARE
(Physician in Lead of Transfers)   Outside Transfer Acceptance Note / Regional Referral Center      Upon patient arrival to floor, please call extension [phone] (if no answer, this will flip to a beeper, so enter your call back number) for Hospital Medicine admit team assignment and for additional admit orders for the patient.  Do not page the attending physician associated with the patient on arrival (this physician may not be on duty at the time of arrival).  Rather, always call [phone] to reach the triage physician for orders and team assignment.      Transferring Physician: Dr. Valdivia    Accepting Physician: Tamie Winkler MD    Date of Acceptance: 03/06/2020    Transferring Facility[facility]     Reason for Transfer: needs neurology; pt of Dr. Zimmer     Report from Transferring Physician/Hospital course: The patient is a 58 y/o female with PMH of MS followed by Dr. Zimmer in neurology. At baseline she in usually in a wheelchair but can transfer to her bed and ambulate for a few steps. This afternoon she noted acute onset LE weakness and cannot weight bear which is entirely new for her. Denies any bladder of bowel incontinence. Her weakness was confirmed on ER exam. Work up has been negative including CT; only her k was at 3.1 but this was replaced. She usually get Q6 month infusions of ocrelizumab but has missed her last two infusions with her last being in Feb 2019. Transfer is for neurology evaluation for symptoms slightly worse than baseline in the setting of MS. Otherwise completely stable from a neurologic standpoint. Vitals stable.       Labs & Radiographs: see EPIC      To Do List:   1) Neurology consult      Tamie Winkler MD  Hospital Medicine Staff

## 2020-03-06 NOTE — PT/OT/SLP EVAL
"Physical Therapy Evaluation    Patient Name:  Deb Figueroa   MRN:  [id number]    Recommendations:     Discharge Recommendations:  outpatient PT   Discharge Equipment Recommendations: none   Barriers to discharge: None    Assessment:     Deb Figueroa is a 59 y.o. female admitted with a medical diagnosis of LE mm weakness.  She presents with the following impairments/functional limitations:  weakness, impaired endurance, impaired self care skills, impaired functional mobilty, gait instability, impaired balance, decreased safety awareness, decreased coordination.  Pt presents with hx MS (pt reports >20 years, pt of Dr. Zimmer) transferred for Neurology evaluation of LE weakness to determine if this represents a flare of her MS. She says that at baseline she has been in a wheelchair for the last few years and can stand when holding onto something for support, but otherwise cannot walk. Pt reports fall 3 times: once when getting standing up to get past a step in her house, and twice from the toilet, when she was transferring to wheelchair. She did not lose consciousness or hit her head.     Upon evaluation, pt requires Jayesh to amb laterally to the R with use of RW and increased time to perform.  Requires Mod-MaxA with bed mobility.      Rehab Prognosis: Good; patient would benefit from acute skilled PT services to address these deficits and reach maximum level of function.    Recent Surgery: * No surgery found *      Plan:     During this hospitalization, patient to be seen 2 x/week to address the identified rehab impairments via gait training, therapeutic activities, therapeutic exercises, neuromuscular re-education and progress toward the following goals:    · Plan of Care Expires:  04/03/20    Subjective     Chief Complaint: poor balance  Patient/Family Comments/goals: "I'd like to try to get up."  Pain/Comfort:  · Pain Rating 1: 0/10    Patients cultural, spiritual, Yazidism conflicts given the current situation: " no    Living Environment:  Pt lives with spouse, 2SH, ramp to enter, pt sleeps and bathes on the 1st floor.   Prior to admission, patients level of function requires use of w/c as primary form of transport, A with transfers, A with ADLs.  Pt does not amb, only standing for stand pivot transfers.  Equipment used at home: wheelchair, bath bench, grab bar.  DME owned (not currently used): none.  Upon discharge, patient will have assistance from spouse.    Objective:     Communicated with nursing prior to session.  Patient found supine with peripheral IV, telemetry  upon PT entry to room.    General Precautions: Standard, fall   Orthopedic Precautions:N/A   Braces: N/A     Exams:  · Cognitive Exam:  Patient is oriented to Person, Place, Time and Situation  · Fine Motor Coordination:    · -       Intact  Left hand thumb/finger opposition skills and Right hand thumb/finger opposition skills  · Gross Motor Coordination:  poor trunk control  · Postural Exam:  Patient presented with the following abnormalities:    · -       Rounded shoulders  · Sensation:    · -       Intact  · Skin Integrity/Edema:      · -       Edema: None noted B LEs  · RUE ROM: WFL  · RUE Strength: WFL  · LUE ROM: WFL  · LUE Strength: WFL  · RLE ROM: WFL  · RLE Strength: WFL  · LLE ROM: WFL  · LLE Strength: WFL    Functional Mobility:  · Bed Mobility:     · Scooting: CGA: to scoot ant/post sitting EOB  · Supine to Sit: maximal assistance  · Sit to Supine: moderate assistance    · Transfers:     · Sit to Stand:  minimum assistance with use of hand placement    · Gait: Pt amb laterally to the R, Jayesh, RW, 10 strides    · Balance: CGA: required intermittently sitting EOB, Jayesh: dynamic standing balance with AD      Therapeutic Activities and Exercises:   Whiteboard updated    AM-PAC 6 CLICK MOBILITY  Total Score:14     Patient left supine with all lines intact, call button in reach and spouse present.    GOALS:   Multidisciplinary Problems     Physical  Therapy Goals        Problem: Physical Therapy Goal    Goal Priority Disciplines Outcome Goal Variances Interventions   Physical Therapy Goal     PT, PT/OT Ongoing, Progressing     Description:  Goals to be met by: 3/20/20     Patient will increase functional independence with mobility by performin. Supine to sit with Moderate Assistance.  2. Sit to supine with Minimal Assistance.  3. Sit to stand transfer with Contact Guard Assistance.  4. Bed to chair transfer with Minimal Assistance using Rolling Walker.  5. Gait  x 10 feet with Minimal Assistance using Rolling Walker.   6. Lower extremity exercise program x 20 reps per handout, with assistance as needed.                      History:     Past Medical History:   Diagnosis Date    Hypertension     MS (multiple sclerosis)     Nephrolithiasis        Past Surgical History:   Procedure Laterality Date    LITHOTRIPSY  2016    Large renal stone.       Time Tracking:     PT Received On: 20  PT Start Time: 958     PT Stop Time: 1017  PT Total Time (min): 19 min     Billable Minutes: Evaluation 8 and Therapeutic Activity 11      Marleny Sams, PT  2020

## 2020-03-06 NOTE — SUBJECTIVE & OBJECTIVE
Past Medical History:   Diagnosis Date    Hypertension     MS (multiple sclerosis)     Nephrolithiasis      Past Surgical History:   Procedure Laterality Date    LITHOTRIPSY  2016    Large renal stone.     Review of patient's allergies indicates:   Allergen Reactions    Vancomycin analogues Itching     Current Neurological Medications: Supposed to be on Ocrevus (missed last two infusions)    No current facility-administered medications on file prior to encounter.      Current Outpatient Medications on File Prior to Encounter   Medication Sig    acetaminophen (TYLENOL) 325 MG tablet Take 2 tablets (650 mg total) by mouth every 8 (eight) hours as needed for Temperature greater than (100).    amlodipine (NORVASC) 5 MG tablet Take 5 mg by mouth once daily.    ARIPiprazole (ABILIFY) 5 MG Tab Take 1 tablet (5 mg total) by mouth once daily.    escitalopram oxalate (LEXAPRO) 20 MG tablet TAKE 1 TABLET (20 MG TOTAL) BY MOUTH EVERY EVENING.    HYDROcodone-acetaminophen (NORCO) 5-325 mg per tablet Take 1 tablet by mouth every 8 (eight) hours as needed for Pain.    lorazepam (ATIVAN) 2 MG Tab Take 1 tablet (2 mg total) by mouth 3 (three) times daily as needed. (Patient taking differently: Take 2 mg by mouth 3 (three) times daily as needed. )    mirabegron (MYRBETRIQ) 25 mg Tb24 ER tablet Take 1 tablet (25 mg total) by mouth once daily.    OXcarbazepine (TRILEPTAL) 300 MG Tab TAKE 1 TABLET (300 MG TOTAL) BY MOUTH 2 (TWO) TIMES DAILY.    rosuvastatin (CRESTOR) 10 MG tablet Take 10 mg by mouth every evening.      Family History     Problem Relation (Age of Onset)    Cancer Father    Hypertension Mother        Tobacco Use    Smoking status: Never Smoker    Smokeless tobacco: Never Used   Substance and Sexual Activity    Alcohol use: No    Drug use: No    Sexual activity: Yes     Partners: Male     Comment:      Review of Systems   Constitutional: Positive for fatigue. Negative for chills and fever.   HENT:  Negative for trouble swallowing and voice change.    Eyes: Negative for photophobia and visual disturbance.   Respiratory: Negative for cough and shortness of breath.    Cardiovascular: Negative for palpitations and leg swelling.   Gastrointestinal: Negative for nausea and vomiting.   Musculoskeletal: Positive for gait problem. Negative for neck pain and neck stiffness.   Skin: Negative for rash and wound.   Neurological: Positive for weakness and numbness. Negative for speech difficulty.   Hematological: Negative for adenopathy. Does not bruise/bleed easily.   Psychiatric/Behavioral: Positive for decreased concentration and dysphoric mood.     Objective:     Vital Signs (Most Recent):  Temp: 98 °F (36.7 °C) (03/06/20 1520)  Pulse: 77 (03/06/20 1520)  Resp: 20 (03/06/20 1520)  BP: 118/68 (03/06/20 1520)  SpO2: 95 % (03/06/20 1520) Vital Signs (24h Range):  Temp:  [97.3 °F (36.3 °C)-98.6 °F (37 °C)] 98 °F (36.7 °C)  Pulse:  [62-79] 77  Resp:  [16-20] 20  SpO2:  [94 %-98 %] 95 %  BP: (113-148)/(65-88) 118/68     Weight: 68 kg (150 lb)  Body mass index is 24.96 kg/m².    Physical Exam  General:  Well-developed, well-nourished, nad though intermittently tearful  HEENT:  NCAT, PERRLA, EOMI, oropharyngeal membranes non-erythematous/without exudate  Neck:  Supple, normal ROM without nuchal rigidity  Resp:  Symmetric expansion, no increased wob  CVS:  No LE edema, extremities warm/well-perfused  GI:  Abd soft, non-distended  Neurologic Exam:  Mental Status:  Awake, alert, oriented to self but not at all to date--guesses 1990s.  Speech, thought content appropriate.  Able to spell 'world' forward and backward without error.  Cranial Nerves:  PERRLA, EOMI.  Facial movement intact, symmetric.  Palate raises symmetrically, tongue protrudes midline.  Trapezius, SCM strength 5/5 bilaterally.  Motor:  Normal bulk and tone.  BUE shoulder abduction, biceps/triceps,  strength 5/5.  BLE hip flexors, knee flexion/extension,  plantarflexion/dorsiflexion 5/5.  Sensory:  Intact to light touch at all extremities and face without inattention.  Vibratory sensation diminished past mid-thigh.  Reflexes:  Biceps, brachioradialis, patellar 2+ and symmetric.  No ankle clonus.  Equivocal toe bilaterally.  Coordination:  FNF, DAMIEN, HTS intact with no dysmetria/ataxia/dysdiadochokinesia.  No resting tremor or myoclonus.  Gait:  Deferred 2/2 fall risk.     Significant Labs:   Pending basic labs    Significant Imaging:   No neurologic imaging on current admission

## 2020-03-06 NOTE — SUBJECTIVE & OBJECTIVE
Past Medical History:   Diagnosis Date    Hypertension     MS (multiple sclerosis)     Nephrolithiasis        Past Surgical History:   Procedure Laterality Date    LITHOTRIPSY  2016    Large renal stone.       Review of patient's allergies indicates:   Allergen Reactions    Vancomycin analogues Itching       No current facility-administered medications on file prior to encounter.      Current Outpatient Medications on File Prior to Encounter   Medication Sig    acetaminophen (TYLENOL) 325 MG tablet Take 2 tablets (650 mg total) by mouth every 8 (eight) hours as needed for Temperature greater than (100).    amlodipine (NORVASC) 5 MG tablet Take 5 mg by mouth once daily.    ARIPiprazole (ABILIFY) 5 MG Tab Take 1 tablet (5 mg total) by mouth once daily.    escitalopram oxalate (LEXAPRO) 20 MG tablet TAKE 1 TABLET (20 MG TOTAL) BY MOUTH EVERY EVENING.    HYDROcodone-acetaminophen (NORCO) 5-325 mg per tablet Take 1 tablet by mouth every 8 (eight) hours as needed for Pain.    lorazepam (ATIVAN) 2 MG Tab Take 1 tablet (2 mg total) by mouth 3 (three) times daily as needed. (Patient taking differently: Take 2 mg by mouth 3 (three) times daily as needed. )    mirabegron (MYRBETRIQ) 25 mg Tb24 ER tablet Take 1 tablet (25 mg total) by mouth once daily.    OXcarbazepine (TRILEPTAL) 300 MG Tab TAKE 1 TABLET (300 MG TOTAL) BY MOUTH 2 (TWO) TIMES DAILY.    rosuvastatin (CRESTOR) 10 MG tablet Take 10 mg by mouth every evening.      Family History     Problem Relation (Age of Onset)    Cancer Father    Hypertension Mother        Tobacco Use    Smoking status: Never Smoker    Smokeless tobacco: Never Used   Substance and Sexual Activity    Alcohol use: No    Drug use: No    Sexual activity: Yes     Partners: Male     Comment:      Review of Systems   Constitutional: Positive for fatigue (chronic). Negative for activity change, appetite change, fever and unexpected weight change.   HENT: Negative for  congestion, dental problem, drooling, hearing loss, mouth sores, sore throat, trouble swallowing and voice change.    Eyes: Negative for visual disturbance.   Respiratory: Negative for cough, choking, chest tightness and shortness of breath.    Cardiovascular: Negative for chest pain, palpitations and leg swelling.   Gastrointestinal: Positive for diarrhea (chronic). Negative for abdominal pain.   Musculoskeletal: Positive for gait problem (falls, LE weakness). Negative for arthralgias.   Skin: Negative for wound.   Neurological: Positive for weakness. Negative for dizziness, tremors, seizures, syncope, facial asymmetry, speech difficulty, light-headedness, numbness and headaches.   Psychiatric/Behavioral: Negative for sleep disturbance.     Objective:     Vital Signs (Most Recent):  Temp: 97.7 °F (36.5 °C) (03/06/20 0342)  Pulse: 78 (03/06/20 0342)  Resp: 16 (03/06/20 0342)  BP: (!) 148/83 (03/06/20 0342)  SpO2: 97 % (03/06/20 0342) Vital Signs (24h Range):  Temp:  [97.7 °F (36.5 °C)-98.6 °F (37 °C)] 97.7 °F (36.5 °C)  Pulse:  [62-79] 78  Resp:  [16-18] 16  SpO2:  [97 %-98 %] 97 %  BP: (140-148)/(78-88) 148/83     Weight: 68 kg (150 lb)  Body mass index is 24.96 kg/m².    Physical Exam   Constitutional: She appears well-developed. No distress.   Appears in no distress   HENT:   Head: Normocephalic and atraumatic.   Right Ear: External ear normal.   Left Ear: External ear normal.   Nose: Nose normal.   Eyes: Pupils are equal, round, and reactive to light. EOM are normal.   Neck: No JVD present. No tracheal deviation present.   Cardiovascular: Normal rate, regular rhythm, normal heart sounds and intact distal pulses.   No murmur heard.  Pulmonary/Chest: Effort normal and breath sounds normal. No stridor.   Abdominal: Soft. Bowel sounds are normal. She exhibits no distension. There is no tenderness. No hernia.   Musculoskeletal: She exhibits no edema.   Normal tone and no muscle wasting appreciated   Neurological: She  is alert. No cranial nerve deficit.   Neck anterior/posterior and lateral flexion 5/5    UE   Shoulder abduction, adduction 5/5 bilaterally  Elbow flexion and extension 5/5 bilaterally  Handgrip 4/5 bilaterally    LE   Hip flexors 2/5 bilaterally   Foot dorsiflexion 4/5 bilaterally   Foot plantarflexion 5/5 bilaterally    CNII-XI WNL   Skin: No rash noted. She is not diaphoretic.   Psychiatric: She has a normal mood and affect. Judgment normal.   Vitals reviewed.         Significant Labs: CBC: No results for input(s): WBC, HGB, HCT, PLT in the last 48 hours.  CMP: No results for input(s): NA, K, CL, CO2, GLU, BUN, CREATININE, CALCIUM, PROT, ALBUMIN, BILITOT, ALKPHOS, AST, ALT, ANIONGAP, EGFRNONAA in the last 48 hours.    Invalid input(s): ESTGFAFRICA  All pertinent labs within the past 24 hours have been reviewed.    Significant Imaging: I have reviewed all pertinent imaging results/findings within the past 24 hours.

## 2020-03-07 PROBLEM — F17.200 HIGH DEPENDENCE ON SMOKING: Status: ACTIVE | Noted: 2020-03-07

## 2020-03-07 LAB
ALBUMIN SERPL BCP-MCNC: 3.4 G/DL (ref 3.5–5.2)
ALP SERPL-CCNC: 95 U/L (ref 55–135)
ALT SERPL W/O P-5'-P-CCNC: 16 U/L (ref 10–44)
ANION GAP SERPL CALC-SCNC: 11 MMOL/L (ref 8–16)
AST SERPL-CCNC: 25 U/L (ref 10–40)
BASOPHILS # BLD AUTO: 0.09 K/UL (ref 0–0.2)
BASOPHILS NFR BLD: 0.8 % (ref 0–1.9)
BILIRUB SERPL-MCNC: 0.3 MG/DL (ref 0.1–1)
BUN SERPL-MCNC: 15 MG/DL (ref 6–20)
CALCIUM SERPL-MCNC: 9.3 MG/DL (ref 8.7–10.5)
CERULOPLASMIN SERPL-MCNC: 23 MG/DL (ref 15–45)
CHLORIDE SERPL-SCNC: 108 MMOL/L (ref 95–110)
CO2 SERPL-SCNC: 23 MMOL/L (ref 23–29)
CREAT SERPL-MCNC: 0.8 MG/DL (ref 0.5–1.4)
DIFFERENTIAL METHOD: ABNORMAL
EOSINOPHIL # BLD AUTO: 0.4 K/UL (ref 0–0.5)
EOSINOPHIL NFR BLD: 3.4 % (ref 0–8)
ERYTHROCYTE [DISTWIDTH] IN BLOOD BY AUTOMATED COUNT: 13.2 % (ref 11.5–14.5)
EST. GFR  (AFRICAN AMERICAN): >60 ML/MIN/1.73 M^2
EST. GFR  (NON AFRICAN AMERICAN): >60 ML/MIN/1.73 M^2
ESTIMATED AVG GLUCOSE: 117 MG/DL (ref 68–131)
GLUCOSE SERPL-MCNC: 96 MG/DL (ref 70–110)
HBA1C MFR BLD HPLC: 5.7 % (ref 4–5.6)
HCT VFR BLD AUTO: 41.5 % (ref 37–48.5)
HGB BLD-MCNC: 12.9 G/DL (ref 12–16)
IMM GRANULOCYTES # BLD AUTO: 0.02 K/UL (ref 0–0.04)
IMM GRANULOCYTES NFR BLD AUTO: 0.2 % (ref 0–0.5)
LYMPHOCYTES # BLD AUTO: 3 K/UL (ref 1–4.8)
LYMPHOCYTES NFR BLD: 28.2 % (ref 18–48)
MAGNESIUM SERPL-MCNC: 2.1 MG/DL (ref 1.6–2.6)
MCH RBC QN AUTO: 27.6 PG (ref 27–31)
MCHC RBC AUTO-ENTMCNC: 31.1 G/DL (ref 32–36)
MCV RBC AUTO: 89 FL (ref 82–98)
MONOCYTES # BLD AUTO: 1 K/UL (ref 0.3–1)
MONOCYTES NFR BLD: 9.3 % (ref 4–15)
NEUTROPHILS # BLD AUTO: 6.2 K/UL (ref 1.8–7.7)
NEUTROPHILS NFR BLD: 58.1 % (ref 38–73)
NRBC BLD-RTO: 0 /100 WBC
PHOSPHATE SERPL-MCNC: 4 MG/DL (ref 2.7–4.5)
PLATELET # BLD AUTO: 275 K/UL (ref 150–350)
PMV BLD AUTO: 11.9 FL (ref 9.2–12.9)
POTASSIUM SERPL-SCNC: 3.4 MMOL/L (ref 3.5–5.1)
PROT SERPL-MCNC: 6.7 G/DL (ref 6–8.4)
RBC # BLD AUTO: 4.67 M/UL (ref 4–5.4)
RPR SER QL: NORMAL
SODIUM SERPL-SCNC: 142 MMOL/L (ref 136–145)
TSH SERPL DL<=0.005 MIU/L-ACNC: 1.06 UIU/ML (ref 0.4–4)
VIT B12 SERPL-MCNC: 293 PG/ML (ref 210–950)
WBC # BLD AUTO: 10.6 K/UL (ref 3.9–12.7)

## 2020-03-07 PROCEDURE — 25000003 PHARM REV CODE 250: Performed by: STUDENT IN AN ORGANIZED HEALTH CARE EDUCATION/TRAINING PROGRAM

## 2020-03-07 PROCEDURE — 99225 PR SUBSEQUENT OBSERVATION CARE,LEVEL II: ICD-10-PCS | Mod: ,,,

## 2020-03-07 PROCEDURE — A9585 GADOBUTROL INJECTION: HCPCS

## 2020-03-07 PROCEDURE — 84443 ASSAY THYROID STIM HORMONE: CPT

## 2020-03-07 PROCEDURE — 86592 SYPHILIS TEST NON-TREP QUAL: CPT

## 2020-03-07 PROCEDURE — 83735 ASSAY OF MAGNESIUM: CPT

## 2020-03-07 PROCEDURE — 84100 ASSAY OF PHOSPHORUS: CPT

## 2020-03-07 PROCEDURE — 82525 ASSAY OF COPPER: CPT

## 2020-03-07 PROCEDURE — 99214 PR OFFICE/OUTPT VISIT, EST, LEVL IV, 30-39 MIN: ICD-10-PCS | Mod: ,,, | Performed by: PSYCHIATRY & NEUROLOGY

## 2020-03-07 PROCEDURE — 83036 HEMOGLOBIN GLYCOSYLATED A1C: CPT

## 2020-03-07 PROCEDURE — 36415 COLL VENOUS BLD VENIPUNCTURE: CPT

## 2020-03-07 PROCEDURE — 94761 N-INVAS EAR/PLS OXIMETRY MLT: CPT

## 2020-03-07 PROCEDURE — 85025 COMPLETE CBC W/AUTO DIFF WBC: CPT

## 2020-03-07 PROCEDURE — 99204 PR OFFICE/OUTPT VISIT, NEW, LEVL IV, 45-59 MIN: ICD-10-PCS | Mod: ,,, | Performed by: PSYCHIATRY & NEUROLOGY

## 2020-03-07 PROCEDURE — 80053 COMPREHEN METABOLIC PANEL: CPT

## 2020-03-07 PROCEDURE — 82607 VITAMIN B-12: CPT

## 2020-03-07 PROCEDURE — S4991 NICOTINE PATCH NONLEGEND: HCPCS | Performed by: STUDENT IN AN ORGANIZED HEALTH CARE EDUCATION/TRAINING PROGRAM

## 2020-03-07 PROCEDURE — 84446 ASSAY OF VITAMIN E: CPT

## 2020-03-07 PROCEDURE — 84165 PROTEIN E-PHORESIS SERUM: CPT

## 2020-03-07 PROCEDURE — 86334 PATHOLOGIST INTERPRETATION IFE: ICD-10-PCS | Mod: 26,,, | Performed by: PATHOLOGY

## 2020-03-07 PROCEDURE — 25500020 PHARM REV CODE 255

## 2020-03-07 PROCEDURE — G0378 HOSPITAL OBSERVATION PER HR: HCPCS

## 2020-03-07 PROCEDURE — 99214 OFFICE O/P EST MOD 30 MIN: CPT | Mod: ,,, | Performed by: PSYCHIATRY & NEUROLOGY

## 2020-03-07 PROCEDURE — 86334 IMMUNOFIX E-PHORESIS SERUM: CPT

## 2020-03-07 PROCEDURE — 82390 ASSAY OF CERULOPLASMIN: CPT

## 2020-03-07 PROCEDURE — 99204 OFFICE O/P NEW MOD 45 MIN: CPT | Mod: ,,, | Performed by: PSYCHIATRY & NEUROLOGY

## 2020-03-07 PROCEDURE — 84165 PROTEIN E-PHORESIS SERUM: CPT | Mod: 26,,, | Performed by: PATHOLOGY

## 2020-03-07 PROCEDURE — 82300 ASSAY OF CADMIUM: CPT

## 2020-03-07 PROCEDURE — 84165 PATHOLOGIST INTERPRETATION SPE: ICD-10-PCS | Mod: 26,,, | Performed by: PATHOLOGY

## 2020-03-07 PROCEDURE — 99225 PR SUBSEQUENT OBSERVATION CARE,LEVEL II: CPT | Mod: ,,,

## 2020-03-07 PROCEDURE — 86334 IMMUNOFIX E-PHORESIS SERUM: CPT | Mod: 26,,, | Performed by: PATHOLOGY

## 2020-03-07 RX ORDER — CHOLECALCIFEROL (VITAMIN D3) 25 MCG
1000 TABLET ORAL DAILY
Status: DISCONTINUED | OUTPATIENT
Start: 2020-03-07 | End: 2020-03-09 | Stop reason: HOSPADM

## 2020-03-07 RX ORDER — GADOBUTROL 604.72 MG/ML
7 INJECTION INTRAVENOUS
Status: COMPLETED | OUTPATIENT
Start: 2020-03-07 | End: 2020-03-07

## 2020-03-07 RX ORDER — BUPROPION HYDROCHLORIDE 150 MG/1
150 TABLET ORAL DAILY
Status: DISCONTINUED | OUTPATIENT
Start: 2020-03-07 | End: 2020-03-09 | Stop reason: HOSPADM

## 2020-03-07 RX ORDER — LORAZEPAM 2 MG/ML
1 INJECTION INTRAMUSCULAR EVERY 4 HOURS PRN
Status: DISCONTINUED | OUTPATIENT
Start: 2020-03-07 | End: 2020-03-09 | Stop reason: HOSPADM

## 2020-03-07 RX ORDER — POTASSIUM CHLORIDE 20 MEQ/1
40 TABLET, EXTENDED RELEASE ORAL ONCE
Status: COMPLETED | OUTPATIENT
Start: 2020-03-07 | End: 2020-03-07

## 2020-03-07 RX ADMIN — NICOTINE 1 PATCH: 7 PATCH TRANSDERMAL at 08:03

## 2020-03-07 RX ADMIN — AMLODIPINE BESYLATE 5 MG: 5 TABLET ORAL at 08:03

## 2020-03-07 RX ADMIN — GADOBUTROL 7 ML: 604.72 INJECTION INTRAVENOUS at 11:03

## 2020-03-07 RX ADMIN — POTASSIUM CHLORIDE 40 MEQ: 1500 TABLET, EXTENDED RELEASE ORAL at 08:03

## 2020-03-07 RX ADMIN — ESCITALOPRAM OXALATE 20 MG: 20 TABLET ORAL at 09:03

## 2020-03-07 RX ADMIN — BUPROPION HYDROCHLORIDE 150 MG: 150 TABLET, FILM COATED, EXTENDED RELEASE ORAL at 04:03

## 2020-03-07 RX ADMIN — VITAMIN D, TAB 1000IU (100/BT) 1000 UNITS: 25 TAB at 08:03

## 2020-03-07 RX ADMIN — ROSUVASTATIN CALCIUM 10 MG: 10 TABLET, FILM COATED ORAL at 09:03

## 2020-03-07 NOTE — ASSESSMENT & PLAN NOTE
History of MS without recent flare; pt of Dr. Zimmer. Has missed Ocrevus infusions for the last year or so but does say it usually improves her weakness and fatigue. Presents with nonfocal findings in the setting of increased fall frequency recently. To be evaluated by Neurology.    Worsening bilateral lower extremity weakness    Flare versus Pseudoflare    Plan  -Neurology consulted and recommended obtaining MRI Brain w/ w/o contrast demyelinating protocol MRI C, T spine w/ w/o contrast demyelinating protocol, Vitamins B12 and E, Heavy metals, Copper, Ceruloplasmin, RPR, TSH, Hgb A1c, SPEP/KATIE. Psychiatry was also consulted for concerns for worsening depression.    -Psychiatry consulted for concerns of worsening depression which may be contributing to MS, missed treatment appointments, added stressors  -CXR, UA, CBC WNLs  -Fall precautions  -PT/OT

## 2020-03-07 NOTE — PROGRESS NOTES
[facility] Hospital Medicine  Progress Note    Patient Name: Deb Figueroa  MRN: [id number]  Patient Class: OP- Observation   Admission Date: 3/6/2020  Length of Stay: 0 days  Attending Physician: Rufino Pardo MD  Primary Care Provider: Jesus Thrasher MD    [facility] Medicine Team: [facility] [place] [facility] to the [facility] 3 Josh Prado MD    Subjective:     Principal Problem:Multiple sclerosis with acute neurologic event        HPI:  59F with hx MS (pt reports >20 years, pt of Dr. Zimmer) transferred for Neurology evaluation of LE weakness to determine if this represents a flare of her MS. She says that at baseline she has been in a wheelchair for the last few years and can stand when holding onto something for support, but otherwise cannot walk. She reports that 03/05 PM she noticed that she felt weaker and that her thighs felt sore. She has not had any recent worsening of diplopia (chronic, corrected by glasses), dysphagia, dysphonia, dyspnea, chest pain, hand weakness, coordination difficulty, fevers, chills, cough, n/v. Does have chronic diarrhea. She also says that 03/05 she fell 3 times: once when getting standing up to get past a step in her house, and twice from the toilet, when she was transferring to wheelchair. She did not lose consciousness or hit her head. No bowel or bladder incontinence.    At OSH she was noted to not be able to bear weight (no LE strength). Denies any bladder of bowel incontinence. Work up has been negative including CT; only her k was at 3.1 but this was replaced. She usually get Q6 month infusions of ocrelizumab but has missed her last two infusions with her last being in Feb 2019 (she says she just didn't feel like going to the appointments but does report that she usually gets better on it). Otherwise completely stable from a neurologic standpoint. Vitals stable.    Overview/Hospital Course:  Patient was admitted to  for workup of MS flare versus pseudoflare. Infectious workup  non-revealing: CXR, UA, no leukocytosis. Neurology consulted and recommended obtaining MRI Brain w/ w/o contrast demyelinating protocol MRI C, T spine w/ w/o contrast demyelinating protocol, Vitamins B12 and E, Heavy metals, Copper, Ceruloplasmin, RPR, TSH, Hgb A1c, SPEP/KATIE. Psychiatry was also consulted for concerns for worsening depression.     Interval History: NAEO. Patient states that she is improving. Able to transfer with assistance. Otherwise no complaints. Denies fevers/chills.    Review of Systems   Constitutional: Negative for chills and fever.   Respiratory: Negative for shortness of breath.    Cardiovascular: Negative for chest pain.   Neurological: Positive for weakness.     Objective:     Vital Signs (Most Recent):  Temp: 98.2 °F (36.8 °C) (03/07/20 0807)  Pulse: 79 (03/07/20 0807)  Resp: 16 (03/07/20 0807)  BP: 134/81 (03/07/20 0807)  SpO2: (!) 93 % (03/07/20 0807) Vital Signs (24h Range):  Temp:  [96.8 °F (36 °C)-98.2 °F (36.8 °C)] 98.2 °F (36.8 °C)  Pulse:  [70-86] 79  Resp:  [14-20] 16  SpO2:  [93 %-98 %] 93 %  BP: (118-135)/(68-87) 134/81     Weight: 68 kg (150 lb)  Body mass index is 24.96 kg/m².    Intake/Output Summary (Last 24 hours) at 3/7/2020 0811  Last data filed at 3/6/2020 1430  Gross per 24 hour   Intake --   Output 450 ml   Net -450 ml      Physical Exam   Constitutional: She is oriented to person, place, and time. She appears well-developed and well-nourished. No distress.   Eyes: Conjunctivae are normal.   Neck: No JVD present.   Cardiovascular: Normal rate and regular rhythm.   Pulmonary/Chest: Effort normal.   Neurological: She is alert and oriented to person, place, and time. No cranial nerve deficit.   Bilateral LE muscle weakness   Psychiatric:   Blunted affect       Significant Labs: All pertinent labs within the past 24 hours have been reviewed.    Significant Imaging: I have reviewed and interpreted all pertinent imaging results/findings within the past 24  hours.      Assessment/Plan:      * Multiple sclerosis with acute neurologic event  History of MS without recent flare; pt of Dr. Zimmer. Has missed Ocrevus infusions for the last year or so but does say it usually improves her weakness and fatigue. Presents with nonfocal findings in the setting of increased fall frequency recently. To be evaluated by Neurology.    Worsening bilateral lower extremity weakness    Flare versus Pseudoflare    Plan  -Neurology consulted and recommended obtaining MRI Brain w/ w/o contrast demyelinating protocol MRI C, T spine w/ w/o contrast demyelinating protocol, Vitamins B12 and E, Heavy metals, Copper, Ceruloplasmin, RPR, TSH, Hgb A1c, SPEP/KATIE. Psychiatry was also consulted for concerns for worsening depression.    -Psychiatry consulted for concerns of worsening depression which may be contributing to MS, missed treatment appointments, added stressors  -CXR, UA, CBC WNLs  -Fall precautions  -PT/OT      Depression  --Psychiatry consulted for concerns of worsening depression which may be contributing to MS, missed treatment appointments, added stressors  -Cont home escitalopram    High dependence on smoking  -counseled on smoking cessation  -nicotine patch      Lower extremity weakness  See Multiple Sclerosis      Hyperlipidemia  Cont home statin    Hypertension  Cont home amlodipine      VTE Risk Mitigation (From admission, onward)         Ordered     IP VTE LOW RISK PATIENT  Once      03/06/20 0585                      Josh Prado MD  Department of Hospital Medicine   [facility] [address]

## 2020-03-07 NOTE — SUBJECTIVE & OBJECTIVE
Subjective:     Interval History: MRI W/WO of Brain and C/T Spine shows stable findings from prior studies with non active demyelinating disease affecting the brain and spinal cord.  No signs of active MS flare.  Likely pseudo exacerbation related to depression, plan per Psychiatry to start Wellbutrin.  Patient will need SNF placement on discharge.     Current Neurological Medications: Ocrevus       Review of Systems   Constitutional: Positive for fatigue. Negative for chills and fever.   HENT: Negative for trouble swallowing and voice change.    Eyes: Negative for photophobia and visual disturbance.   Respiratory: Negative for cough and shortness of breath.    Cardiovascular: Negative for palpitations and leg swelling.   Gastrointestinal: Negative for nausea and vomiting.   Musculoskeletal: Positive for gait problem. Negative for neck pain and neck stiffness.   Skin: Negative for rash and wound.   Neurological: Positive for weakness and numbness. Negative for speech difficulty.   Hematological: Negative for adenopathy. Does not bruise/bleed easily.   Psychiatric/Behavioral: Positive for decreased concentration and dysphoric mood.     Objective:     Vital Signs (Most Recent):  Temp: 97.5 °F (36.4 °C) (03/07/20 1539)  Pulse: 75 (03/07/20 1539)  Resp: 20 (03/07/20 1539)  BP: 123/82 (03/07/20 1539)  SpO2: 96 % (03/07/20 1539) Vital Signs (24h Range):  Temp:  [96.8 °F (36 °C)-98.2 °F (36.8 °C)] 97.5 °F (36.4 °C)  Pulse:  [75-86] 75  Resp:  [14-20] 20  SpO2:  [93 %-98 %] 96 %  BP: (122-135)/(71-87) 123/82     Weight: 68 kg (150 lb)  Body mass index is 24.96 kg/m².    Physical Exam    General:  Well-developed, well-nourished, nad though intermittently tearful  HEENT:  NCAT, PERRLA, EOMI, oropharyngeal membranes non-erythematous/without exudate  Neck:  Supple, normal ROM without nuchal rigidity  Resp:  Symmetric expansion, no increased wob  CVS:  No LE edema, extremities warm/well-perfused  GI:  Abd soft,  non-distended  Neurologic Exam:  Mental Status:  Awake, alert, oriented to self but not at all to date--guesses 1990s.  Speech, thought content appropriate.  Able to spell 'world' forward and backward without error.  Cranial Nerves:  PERRLA, EOMI.  Facial movement intact, symmetric.  Palate raises symmetrically, tongue protrudes midline.  Trapezius, SCM strength 5/5 bilaterally.  Motor:  Normal bulk and tone.  BUE shoulder abduction, biceps/triceps,  strength 5/5.  BLE hip flexors, knee flexion/extension, plantarflexion/dorsiflexion 5/5.  Sensory:  Intact to light touch at all extremities and face without inattention.  Vibratory sensation diminished past mid-thigh.  Reflexes:  Biceps, brachioradialis, patellar 2+ and symmetric.  No ankle clonus.  Equivocal toe bilaterally.  Coordination:  FNF, DAMIEN, HTS intact with no dysmetria/ataxia/dysdiadochokinesia.  No resting tremor or myoclonus.  Gait:  Deferred 2/2 fall risk.       Significant Labs:   All lab work from last 24 hours has been personally reviewed by myself    Significant Imaging:     MRI Brain, C/T Spine W/WO 3/7/2020  - stable findings with non active demyelinating disease of the brain and spinal cord

## 2020-03-07 NOTE — ASSESSMENT & PLAN NOTE
--Psychiatry consulted for concerns of worsening depression which may be contributing to MS, missed treatment appointments, added stressors  -Cont home escitalopram

## 2020-03-07 NOTE — PROGRESS NOTES
[facility]  Neurology  Progress Note    Patient Name: Deb Figueroa  MRN: [id number]  Admission Date: 3/6/2020  Hospital Length of Stay: 0 days  Code Status: Full Code   Attending Provider: Rufino Pardo MD  Primary Care Physician: Jesus Thrasher MD   Principal Problem:Multiple sclerosis with acute neurologic event      Subjective:     Interval History: MRI W/WO of Brain and C/T Spine shows stable findings from prior studies with non active demyelinating disease affecting the brain and spinal cord.  No signs of active MS flare.  Likely pseudo exacerbation related to depression, plan per Psychiatry to start Wellbutrin.  Patient will need SNF placement on discharge.     Current Neurological Medications: Ocrevus       Review of Systems   Constitutional: Positive for fatigue. Negative for chills and fever.   HENT: Negative for trouble swallowing and voice change.    Eyes: Negative for photophobia and visual disturbance.   Respiratory: Negative for cough and shortness of breath.    Cardiovascular: Negative for palpitations and leg swelling.   Gastrointestinal: Negative for nausea and vomiting.   Musculoskeletal: Positive for gait problem. Negative for neck pain and neck stiffness.   Skin: Negative for rash and wound.   Neurological: Positive for weakness and numbness. Negative for speech difficulty.   Hematological: Negative for adenopathy. Does not bruise/bleed easily.   Psychiatric/Behavioral: Positive for decreased concentration and dysphoric mood.     Objective:     Vital Signs (Most Recent):  Temp: 97.5 °F (36.4 °C) (03/07/20 1539)  Pulse: 75 (03/07/20 1539)  Resp: 20 (03/07/20 1539)  BP: 123/82 (03/07/20 1539)  SpO2: 96 % (03/07/20 1539) Vital Signs (24h Range):  Temp:  [96.8 °F (36 °C)-98.2 °F (36.8 °C)] 97.5 °F (36.4 °C)  Pulse:  [75-86] 75  Resp:  [14-20] 20  SpO2:  [93 %-98 %] 96 %  BP: (122-135)/(71-87) 123/82     Weight: 68 kg (150 lb)  Body mass index is 24.96 kg/m².    Physical  Exam    General:  Well-developed, well-nourished, nad though intermittently tearful  HEENT:  NCAT, PERRLA, EOMI, oropharyngeal membranes non-erythematous/without exudate  Neck:  Supple, normal ROM without nuchal rigidity  Resp:  Symmetric expansion, no increased wob  CVS:  No LE edema, extremities warm/well-perfused  GI:  Abd soft, non-distended  Neurologic Exam:  Mental Status:  Awake, alert, oriented to self but not at all to date--guesses 1990s.  Speech, thought content appropriate.  Able to spell 'world' forward and backward without error.  Cranial Nerves:  PERRLA, EOMI.  Facial movement intact, symmetric.  Palate raises symmetrically, tongue protrudes midline.  Trapezius, SCM strength 5/5 bilaterally.  Motor:  Normal bulk and tone.  BUE shoulder abduction, biceps/triceps,  strength 5/5.  BLE hip flexors, knee flexion/extension, plantarflexion/dorsiflexion 5/5.  Sensory:  Intact to light touch at all extremities and face without inattention.  Vibratory sensation diminished past mid-thigh.  Reflexes:  Biceps, brachioradialis, patellar 2+ and symmetric.  No ankle clonus.  Equivocal toe bilaterally.  Coordination:  FNF, DAMIEN, HTS intact with no dysmetria/ataxia/dysdiadochokinesia.  No resting tremor or myoclonus.  Gait:  Deferred 2/2 fall risk.       Significant Labs:   All lab work from last 24 hours has been personally reviewed by myself    Significant Imaging:     MRI Brain, C/T Spine W/WO 3/7/2020  - stable findings with non active demyelinating disease of the brain and spinal cord    Assessment and Plan:     * Multiple sclerosis with acute neurologic event  Likely pseudoexacerbation     Depression  Progressive fatigue, cognitive decline, and newer instability with transfers.  Concern for MS relapse +/- pseudoexacerbation in setting of infection + depression.    -MRI Brain w/ w/o contrast demyelinating protocol unremarkable for acute process  -MRI C, T spine w/ w/o contrast demyelinating protocol  unremarkable for acute process  -lab work shows no signs of active infection, will followup outstanding lab work in clinic  -Psychiatry following with plan to start Wellbutrin  -Encourage patient to work with PT, OT, will likely require skilled nursing placement as no safe discharge option is available  -follow-up with MS Clinic  -neurology will sign off at this time        VTE Risk Mitigation (From admission, onward)         Ordered     IP VTE LOW RISK PATIENT  Once      03/06/20 0556                Josh Sánchez MD  Neurology  [facility]

## 2020-03-07 NOTE — SUBJECTIVE & OBJECTIVE
Interval History: NAEO. Patient states that she is improving. Able to transfer with assistance. Otherwise no complaints. Denies fevers/chills.    Review of Systems   Constitutional: Negative for chills and fever.   Respiratory: Negative for shortness of breath.    Cardiovascular: Negative for chest pain.   Neurological: Positive for weakness.     Objective:     Vital Signs (Most Recent):  Temp: 98.2 °F (36.8 °C) (03/07/20 0807)  Pulse: 79 (03/07/20 0807)  Resp: 16 (03/07/20 0807)  BP: 134/81 (03/07/20 0807)  SpO2: (!) 93 % (03/07/20 0807) Vital Signs (24h Range):  Temp:  [96.8 °F (36 °C)-98.2 °F (36.8 °C)] 98.2 °F (36.8 °C)  Pulse:  [70-86] 79  Resp:  [14-20] 16  SpO2:  [93 %-98 %] 93 %  BP: (118-135)/(68-87) 134/81     Weight: 68 kg (150 lb)  Body mass index is 24.96 kg/m².    Intake/Output Summary (Last 24 hours) at 3/7/2020 0811  Last data filed at 3/6/2020 1430  Gross per 24 hour   Intake --   Output 450 ml   Net -450 ml      Physical Exam   Constitutional: She is oriented to person, place, and time. She appears well-developed and well-nourished. No distress.   Eyes: Conjunctivae are normal.   Neck: No JVD present.   Cardiovascular: Normal rate and regular rhythm.   Pulmonary/Chest: Effort normal.   Neurological: She is alert and oriented to person, place, and time. No cranial nerve deficit.   Bilateral LE muscle weakness   Psychiatric:   Blunted affect       Significant Labs: All pertinent labs within the past 24 hours have been reviewed.    Significant Imaging: I have reviewed and interpreted all pertinent imaging results/findings within the past 24 hours.

## 2020-03-07 NOTE — ASSESSMENT & PLAN NOTE
Progressive fatigue, cognitive decline, and newer instability with transfers.  Concern for MS relapse +/- pseudoexacerbation in setting of infection + depression.    -MRI Brain w/ w/o contrast demyelinating protocol unremarkable for acute process  -MRI C, T spine w/ w/o contrast demyelinating protocol unremarkable for acute process  -lab work shows no signs of active infection, will followup outstanding lab work in clinic  -Psychiatry following with plan to start Wellbutrin  -Encourage patient to work with PT, OT, will likely require skilled nursing placement as no safe discharge option is available  -follow-up with MS Clinic  -neurology will sign off at this time

## 2020-03-07 NOTE — ASSESSMENT & PLAN NOTE
Assessment:  57 year old female admitted for mild MS flare, neuro thinking stress playing a part. Patient with worsening depression since bio father got custody of her 5 year old godchild she reports was like her baby.     Impression:  MDD, recurrent  R/o Complicated Bereavement    Plan:  No need for PEC,  agrees  Continue Lexapro 20 mg  Start Wellbutrin  mg qday, patient provides informed consent (ordered placed with primary team consent)   Unclear how much Ativan patient typically takes  PRN ativan 1 mg q4h for benzo withdrawal with HR>95, DBP>95 (ordered placed with primary team consent)

## 2020-03-07 NOTE — SUBJECTIVE & OBJECTIVE
Patient History           Medical as of 3/7/2020     Past Medical History     Diagnosis Date Comments Source    Hypertension -- -- Provider    MS (multiple sclerosis) -- -- Provider    Nephrolithiasis -- -- Provider          Pertinent Negatives     Diagnosis Date Noted Comments Source    Abnormal Pap smear 07/08/2014 -- Provider                  Surgical as of 3/7/2020     Past Surgical History     Procedure Laterality Date Comments Source    LITHOTRIPSY -- 2016 Large renal stone. Provider                  Family as of 3/7/2020     Problem Relation Name Age of Onset Comments Source    Cancer Father -- -- prostate Provider    Hypertension Mother -- -- -- Provider    Breast cancer Neg Hx -- -- -- Provider    Ovarian cancer Neg Hx -- -- -- Provider    Colon cancer Neg Hx -- -- -- Provider            Tobacco Use as of 3/7/2020     Smoking Status Smoking Start Date Smoking Quit Date Packs/Day Years Used    Never Smoker -- -- -- --    Types Comments Smokeless Tobacco Status Smokeless Tobacco Quit Date Source    -- -- Never Used -- Provider            Alcohol Use as of 3/7/2020     Alcohol Use Drinks/Week Alcohol/Week Comments Source    No -- -- -- Provider    Frequency Standard Drinks Binge Drinking        -- -- --              Drug Use as of 3/7/2020     Drug Use Types Frequency Comments Source    No -- -- -- Provider            Sexual Activity as of 3/7/2020     Sexually Active Birth Control Partners Comments Source    Yes -- Male  Provider            Activities of Daily Living as of 3/7/2020    None           Social Documentation as of 3/7/2020    None           Occupational as of 3/7/2020    None           Socioeconomic as of 3/7/2020     Marital Status Spouse Name Number of Children Years Education Education Level Preferred Language Ethnicity Race Source     -- -- -- -- English /White White --    Financial Resource Strain Food Insecurity: Worry Food Insecurity: Inability Transportation  Needs: Medical Transportation Needs: Non-medical    -- -- -- -- --            Pertinent History     Question Response Comments    Lives with -- --    Place in Birth Order -- --    Lives in -- --    Number of Siblings -- --    Raised by -- --    Legal Involvement -- --    Childhood Trauma -- --    Criminal History of -- --    Financial Status -- --    Highest Level of Education -- --    Does patient have access to a firearm? -- --     Service -- --    Primary Leisure Activity -- --    Spirituality -- --        Past Medical History:   Diagnosis Date    Hypertension     MS (multiple sclerosis)     Nephrolithiasis      Past Surgical History:   Procedure Laterality Date    LITHOTRIPSY  2016    Large renal stone.     Family History     Problem Relation (Age of Onset)    Cancer Father    Hypertension Mother        Tobacco Use    Smoking status: Never Smoker    Smokeless tobacco: Never Used   Substance and Sexual Activity    Alcohol use: No    Drug use: No    Sexual activity: Yes     Partners: Male     Comment:      Review of patient's allergies indicates:   Allergen Reactions    Vancomycin analogues Itching       No current facility-administered medications on file prior to encounter.      Current Outpatient Medications on File Prior to Encounter   Medication Sig    acetaminophen (TYLENOL) 325 MG tablet Take 2 tablets (650 mg total) by mouth every 8 (eight) hours as needed for Temperature greater than (100).    amlodipine (NORVASC) 5 MG tablet Take 5 mg by mouth once daily.    ARIPiprazole (ABILIFY) 5 MG Tab Take 1 tablet (5 mg total) by mouth once daily.    escitalopram oxalate (LEXAPRO) 20 MG tablet TAKE 1 TABLET (20 MG TOTAL) BY MOUTH EVERY EVENING.    HYDROcodone-acetaminophen (NORCO) 5-325 mg per tablet Take 1 tablet by mouth every 8 (eight) hours as needed for Pain.    lorazepam (ATIVAN) 2 MG Tab Take 1 tablet (2 mg total) by mouth 3 (three) times daily as needed. (Patient taking  "differently: Take 2 mg by mouth 3 (three) times daily as needed. )    mirabegron (MYRBETRIQ) 25 mg Tb24 ER tablet Take 1 tablet (25 mg total) by mouth once daily.    OXcarbazepine (TRILEPTAL) 300 MG Tab TAKE 1 TABLET (300 MG TOTAL) BY MOUTH 2 (TWO) TIMES DAILY.    rosuvastatin (CRESTOR) 10 MG tablet Take 10 mg by mouth every evening.      Psychotherapeutics (From admission, onward)    Start     Stop Route Frequency Ordered    03/07/20 1523  lorazepam injection 1 mg      -- IV Every 4 hours PRN 03/07/20 1424    03/07/20 1515  buPROPion TB24 tablet 150 mg      -- Oral Daily 03/07/20 1404    03/06/20 2100  escitalopram oxalate tablet 20 mg      -- Oral Nightly 03/06/20 0556    03/06/20 0654  LORazepam tablet 2 mg      -- Oral Nightly PRN 03/06/20 0556        Review of Systems    Objective:     Vital Signs (Most Recent):  Temp: 97.4 °F (36.3 °C) (03/07/20 1228)  Pulse: 79 (03/07/20 1228)  Resp: 20 (03/07/20 1228)  BP: 122/71 (03/07/20 1228)  SpO2: 95 % (03/07/20 1228) Vital Signs (24h Range):  Temp:  [96.8 °F (36 °C)-98.2 °F (36.8 °C)] 97.4 °F (36.3 °C)  Pulse:  [77-86] 79  Resp:  [14-20] 20  SpO2:  [93 %-98 %] 95 %  BP: (118-135)/(68-87) 122/71     Height: 5' 5" (165.1 cm)  Weight: 68 kg (150 lb)  Body mass index is 24.96 kg/m².      Intake/Output Summary (Last 24 hours) at 3/7/2020 1428  Last data filed at 3/6/2020 1430  Gross per 24 hour   Intake --   Output 450 ml   Net -450 ml       Physical Exam   Psychiatric:   Mental Status Exam:  Appearance: older than stated age, disheveled  Behavior/Cooperation: cooperative, odd  Speech: normal tone, normal rate, normal volume  Mood: depressed  Affect: blunted and oddly related  Thought Process: perseverative  Thought Content: normal, no suicidality, no homicidality, delusions, or paranoia, ruminations   Orientation: grossly intact  Memory: Impaired; did not know current president; 0/3 words on delayed recall  Attention Span/Concentration: Normal, passed " MARS  Insight: fair  Judgment: fair     Nursing note and vitals reviewed.       Significant Labs: All pertinent labs within the past 24 hours have been reviewed.    Significant Imaging: I have reviewed all pertinent imaging results/findings within the past 24 hours.
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
## 2020-03-07 NOTE — PLAN OF CARE
Pt in bed resting quietly. No acute distress noted.  at bedside asleep. Pt has no complaints at this time. Will continue to monitor.
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
## 2020-03-07 NOTE — HOSPITAL COURSE
Patient was admitted to  for workup of MS flare versus pseudoflare. Infectious workup non-revealing: CXR, UA, no leukocytosis. Neurology consulted and recommended obtaining MRI BRAIN/C/T SPINE, vitamin B12 and E, heavy metals screem, copper, ceruloplasmin, RPR, TSH, Hgb A1c, SPEP/KATIE. Psychiatry was also consulted for concerns for worsening depression - started patient on Wellbutrin and recommended continuing Lexapro. MRI showed no signs of acute processes/new MS flare. Infection workup was negative. Neurology signed off with recommendations of follow up in MS clinic. PT/OT recommending outpatient PT and OT, but NEUROLOGY wanted to see if candidate for SNF. Ultimately decided on home with Home Health.

## 2020-03-08 LAB
ALBUMIN SERPL BCP-MCNC: 3.7 G/DL (ref 3.5–5.2)
ALP SERPL-CCNC: 92 U/L (ref 55–135)
ALT SERPL W/O P-5'-P-CCNC: 16 U/L (ref 10–44)
ANION GAP SERPL CALC-SCNC: 9 MMOL/L (ref 8–16)
AST SERPL-CCNC: 21 U/L (ref 10–40)
BASOPHILS # BLD AUTO: 0.08 K/UL (ref 0–0.2)
BASOPHILS NFR BLD: 0.7 % (ref 0–1.9)
BILIRUB SERPL-MCNC: 0.3 MG/DL (ref 0.1–1)
BUN SERPL-MCNC: 13 MG/DL (ref 6–20)
CALCIUM SERPL-MCNC: 8.9 MG/DL (ref 8.7–10.5)
CHLORIDE SERPL-SCNC: 107 MMOL/L (ref 95–110)
CO2 SERPL-SCNC: 24 MMOL/L (ref 23–29)
CREAT SERPL-MCNC: 0.7 MG/DL (ref 0.5–1.4)
DIFFERENTIAL METHOD: ABNORMAL
EOSINOPHIL # BLD AUTO: 0.4 K/UL (ref 0–0.5)
EOSINOPHIL NFR BLD: 3.4 % (ref 0–8)
ERYTHROCYTE [DISTWIDTH] IN BLOOD BY AUTOMATED COUNT: 13.1 % (ref 11.5–14.5)
EST. GFR  (AFRICAN AMERICAN): >60 ML/MIN/1.73 M^2
EST. GFR  (NON AFRICAN AMERICAN): >60 ML/MIN/1.73 M^2
GLUCOSE SERPL-MCNC: 104 MG/DL (ref 70–110)
HCT VFR BLD AUTO: 42.6 % (ref 37–48.5)
HGB BLD-MCNC: 13.3 G/DL (ref 12–16)
IMM GRANULOCYTES # BLD AUTO: 0.02 K/UL (ref 0–0.04)
IMM GRANULOCYTES NFR BLD AUTO: 0.2 % (ref 0–0.5)
LYMPHOCYTES # BLD AUTO: 2.9 K/UL (ref 1–4.8)
LYMPHOCYTES NFR BLD: 25.2 % (ref 18–48)
MAGNESIUM SERPL-MCNC: 2.2 MG/DL (ref 1.6–2.6)
MCH RBC QN AUTO: 27.7 PG (ref 27–31)
MCHC RBC AUTO-ENTMCNC: 31.2 G/DL (ref 32–36)
MCV RBC AUTO: 89 FL (ref 82–98)
MONOCYTES # BLD AUTO: 0.8 K/UL (ref 0.3–1)
MONOCYTES NFR BLD: 7.2 % (ref 4–15)
NEUTROPHILS # BLD AUTO: 7.2 K/UL (ref 1.8–7.7)
NEUTROPHILS NFR BLD: 63.3 % (ref 38–73)
NRBC BLD-RTO: 0 /100 WBC
PHOSPHATE SERPL-MCNC: 3.7 MG/DL (ref 2.7–4.5)
PLATELET # BLD AUTO: 265 K/UL (ref 150–350)
PMV BLD AUTO: 11.7 FL (ref 9.2–12.9)
POTASSIUM SERPL-SCNC: 3.3 MMOL/L (ref 3.5–5.1)
PROT SERPL-MCNC: 7.1 G/DL (ref 6–8.4)
RBC # BLD AUTO: 4.8 M/UL (ref 4–5.4)
SODIUM SERPL-SCNC: 140 MMOL/L (ref 136–145)
WBC # BLD AUTO: 11.33 K/UL (ref 3.9–12.7)

## 2020-03-08 PROCEDURE — 83735 ASSAY OF MAGNESIUM: CPT

## 2020-03-08 PROCEDURE — G0378 HOSPITAL OBSERVATION PER HR: HCPCS

## 2020-03-08 PROCEDURE — 99214 OFFICE O/P EST MOD 30 MIN: CPT | Mod: ,,, | Performed by: PSYCHIATRY & NEUROLOGY

## 2020-03-08 PROCEDURE — 99224 PR SUBSEQUENT OBSERVATION CARE,LEVEL I: ICD-10-PCS | Mod: ,,,

## 2020-03-08 PROCEDURE — 25000003 PHARM REV CODE 250: Performed by: STUDENT IN AN ORGANIZED HEALTH CARE EDUCATION/TRAINING PROGRAM

## 2020-03-08 PROCEDURE — S4991 NICOTINE PATCH NONLEGEND: HCPCS | Performed by: STUDENT IN AN ORGANIZED HEALTH CARE EDUCATION/TRAINING PROGRAM

## 2020-03-08 PROCEDURE — 85025 COMPLETE CBC W/AUTO DIFF WBC: CPT

## 2020-03-08 PROCEDURE — 86706 HEP B SURFACE ANTIBODY: CPT

## 2020-03-08 PROCEDURE — 99224 PR SUBSEQUENT OBSERVATION CARE,LEVEL I: CPT | Mod: ,,,

## 2020-03-08 PROCEDURE — 86704 HEP B CORE ANTIBODY TOTAL: CPT

## 2020-03-08 PROCEDURE — 80053 COMPREHEN METABOLIC PANEL: CPT

## 2020-03-08 PROCEDURE — 99214 PR OFFICE/OUTPT VISIT, EST, LEVL IV, 30-39 MIN: ICD-10-PCS | Mod: ,,, | Performed by: PSYCHIATRY & NEUROLOGY

## 2020-03-08 PROCEDURE — 94761 N-INVAS EAR/PLS OXIMETRY MLT: CPT

## 2020-03-08 PROCEDURE — 84100 ASSAY OF PHOSPHORUS: CPT

## 2020-03-08 PROCEDURE — 87340 HEPATITIS B SURFACE AG IA: CPT

## 2020-03-08 PROCEDURE — 36415 COLL VENOUS BLD VENIPUNCTURE: CPT

## 2020-03-08 RX ORDER — BUPROPION HYDROCHLORIDE 150 MG/1
150 TABLET ORAL DAILY
Qty: 30 TABLET | Refills: 11 | Status: SHIPPED | OUTPATIENT
Start: 2020-03-09 | End: 2021-04-05 | Stop reason: SDUPTHER

## 2020-03-08 RX ORDER — POTASSIUM CHLORIDE 20 MEQ/1
40 TABLET, EXTENDED RELEASE ORAL ONCE
Status: COMPLETED | OUTPATIENT
Start: 2020-03-08 | End: 2020-03-08

## 2020-03-08 RX ADMIN — POTASSIUM CHLORIDE 40 MEQ: 1500 TABLET, EXTENDED RELEASE ORAL at 08:03

## 2020-03-08 RX ADMIN — ROSUVASTATIN CALCIUM 10 MG: 10 TABLET, FILM COATED ORAL at 09:03

## 2020-03-08 RX ADMIN — NICOTINE 1 PATCH: 7 PATCH TRANSDERMAL at 08:03

## 2020-03-08 RX ADMIN — AMLODIPINE BESYLATE 5 MG: 5 TABLET ORAL at 08:03

## 2020-03-08 RX ADMIN — ESCITALOPRAM OXALATE 20 MG: 20 TABLET ORAL at 09:03

## 2020-03-08 RX ADMIN — BUPROPION HYDROCHLORIDE 150 MG: 150 TABLET, FILM COATED, EXTENDED RELEASE ORAL at 08:03

## 2020-03-08 RX ADMIN — VITAMIN D, TAB 1000IU (100/BT) 1000 UNITS: 25 TAB at 08:03

## 2020-03-08 NOTE — PLAN OF CARE
[facility]    HOME HEALTH ORDERS  FACE TO FACE ENCOUNTER    Patient Name: Deb Figueroa  YOB: 1960    PCP: Jesus Thrasher MD   PCP Address: [address] [place] / [place] [address]  PCP Phone Number: [phone]  PCP Fax: [phone]    Encounter Date: 03/09/2020    Admit to Home Health    Diagnoses:  Active Hospital Problems    Diagnosis  POA    *Multiple sclerosis with acute neurologic event [G35]  Yes    Tobacco use disorder [F17.200]  Yes    Lower extremity weakness [R29.898]  Yes    Depression [F32.9]  Yes    Hyperlipidemia [E78.5]  Yes    Hypertension [I10]  Yes      Resolved Hospital Problems   No resolved problems to display.       No future appointments.        I have seen and examined this patient face to face today. My clinical findings that support the need for the home health skilled services and home bound status are the following:  Weakness/numbness causing balance and gait disturbance due to Weakness/Debility making it taxing to leave home.    Allergies:  Review of patient's allergies indicates:   Allergen Reactions    Vancomycin analogues Itching       Diet: regular diet    Activities: activity as tolerated    Nursing:   SN to complete comprehensive assessment including routine vital signs. Instruct on disease process and s/s of complications to report to MD. Review/verify medication list sent home with the patient at time of discharge  and instruct patient/caregiver as needed. Frequency may be adjusted depending on start of care date.    Notify MD if SBP > 160 or < 90; DBP > 90 or < 50; HR > 120 or < 50; Temp > 101; Other      CONSULTS:    Physical Therapy to evaluate and treat. Evaluate for home safety and equipment needs; Establish/upgrade home exercise program. Perform / instruct on therapeutic exercises, gait training, transfer training, and Range of Motion.  Occupational Therapy to evaluate and treat. Evaluate home environment for safety and  equipment needs. Perform/Instruct on transfers, ADL training, ROM, and therapeutic exercises.    MISCELLANEOUS CARE:  N/A    WOUND CARE ORDERS  n/a      Medications: Review discharge medications with patient and family and provide education.      Current Discharge Medication List      START taking these medications    Details   buPROPion (WELLBUTRIN XL) 150 MG TB24 tablet Take 1 tablet (150 mg total) by mouth once daily.  Qty: 30 tablet, Refills: 11         CONTINUE these medications which have NOT CHANGED    Details   acetaminophen (TYLENOL) 325 MG tablet Take 2 tablets (650 mg total) by mouth every 8 (eight) hours as needed for Temperature greater than (100).  Refills: 0      amlodipine (NORVASC) 5 MG tablet Take 5 mg by mouth once daily.      ARIPiprazole (ABILIFY) 5 MG Tab Take 1 tablet (5 mg total) by mouth once daily.  Qty: 30 tablet, Refills: 11    Associated Diagnoses: MS (multiple sclerosis); Dysthymia      escitalopram oxalate (LEXAPRO) 20 MG tablet TAKE 1 TABLET (20 MG TOTAL) BY MOUTH EVERY EVENING.  Qty: 30 tablet, Refills: 3      HYDROcodone-acetaminophen (NORCO) 5-325 mg per tablet Take 1 tablet by mouth every 8 (eight) hours as needed for Pain.  Qty: 12 tablet, Refills: 0      lorazepam (ATIVAN) 2 MG Tab Take 1 tablet (2 mg total) by mouth 3 (three) times daily as needed.  Qty: 90 tablet, Refills: 0      mirabegron (MYRBETRIQ) 25 mg Tb24 ER tablet Take 1 tablet (25 mg total) by mouth once daily.  Qty: 30 tablet, Refills: 11    Associated Diagnoses: Neurogenic bladder      OXcarbazepine (TRILEPTAL) 300 MG Tab TAKE 1 TABLET (300 MG TOTAL) BY MOUTH 2 (TWO) TIMES DAILY.  Qty: 60 tablet, Refills: 5      rosuvastatin (CRESTOR) 10 MG tablet Take 10 mg by mouth every evening.   Refills: 11             I certify that this patient is confined to her home and needs physical therapy and occupational therapy.

## 2020-03-08 NOTE — SUBJECTIVE & OBJECTIVE
Interval History: NAEO. Patient states that she is improving. Able to transfer with assistance. Otherwise no complaints. Denies fevers/chills.    Review of Systems   Constitutional: Negative for chills and fever.   Respiratory: Negative for shortness of breath.    Cardiovascular: Negative for chest pain.   Neurological: Positive for weakness.     Objective:     Vital Signs (Most Recent):  Temp: 98.5 °F (36.9 °C) (03/08/20 0710)  Pulse: 88 (03/08/20 0710)  Resp: 18 (03/08/20 0710)  BP: 139/79 (03/08/20 0710)  SpO2: (!) 93 % (03/08/20 0710) Vital Signs (24h Range):  Temp:  [97.4 °F (36.3 °C)-98.5 °F (36.9 °C)] 98.5 °F (36.9 °C)  Pulse:  [75-88] 88  Resp:  [16-20] 18  SpO2:  [93 %-97 %] 93 %  BP: (119-139)/(60-82) 139/79     Weight: 68 kg (150 lb)  Body mass index is 24.96 kg/m².  No intake or output data in the 24 hours ending 03/08/20 0913   Physical Exam   Constitutional: She is oriented to person, place, and time. She appears well-developed and well-nourished. No distress.   Eyes: Conjunctivae are normal.   Neck: No JVD present.   Cardiovascular: Normal rate and regular rhythm.   Pulmonary/Chest: Effort normal.   Neurological: She is alert and oriented to person, place, and time. No cranial nerve deficit.   Bilateral LE muscle weakness   Psychiatric:   Blunted affect       Significant Labs: All pertinent labs within the past 24 hours have been reviewed.    Significant Imaging: I have reviewed and interpreted all pertinent imaging results/findings within the past 24 hours.

## 2020-03-08 NOTE — SUBJECTIVE & OBJECTIVE
Patient History           Medical as of 3/8/2020     Past Medical History     Diagnosis Date Comments Source    Hypertension -- -- Provider    MS (multiple sclerosis) -- -- Provider    Nephrolithiasis -- -- Provider          Pertinent Negatives     Diagnosis Date Noted Comments Source    Abnormal Pap smear 07/08/2014 -- Provider                  Surgical as of 3/8/2020     Past Surgical History     Procedure Laterality Date Comments Source    LITHOTRIPSY -- 2016 Large renal stone. Provider                  Family as of 3/8/2020     Problem Relation Name Age of Onset Comments Source    Cancer Father -- -- prostate Provider    Hypertension Mother -- -- -- Provider    Breast cancer Neg Hx -- -- -- Provider    Ovarian cancer Neg Hx -- -- -- Provider    Colon cancer Neg Hx -- -- -- Provider            Tobacco Use as of 3/8/2020     Smoking Status Smoking Start Date Smoking Quit Date Packs/Day Years Used    Never Smoker -- -- -- --    Types Comments Smokeless Tobacco Status Smokeless Tobacco Quit Date Source    -- -- Never Used -- Provider            Alcohol Use as of 3/8/2020     Alcohol Use Drinks/Week Alcohol/Week Comments Source    No -- -- -- Provider    Frequency Standard Drinks Binge Drinking        -- -- --              Drug Use as of 3/8/2020     Drug Use Types Frequency Comments Source    No -- -- -- Provider            Sexual Activity as of 3/8/2020     Sexually Active Birth Control Partners Comments Source    Yes -- Male  Provider            Activities of Daily Living as of 3/8/2020    None           Social Documentation as of 3/8/2020    None           Occupational as of 3/8/2020    None           Socioeconomic as of 3/8/2020     Marital Status Spouse Name Number of Children Years Education Education Level Preferred Language Ethnicity Race Source     -- -- -- -- English /White White --    Financial Resource Strain Food Insecurity: Worry Food Insecurity: Inability Transportation  Needs: Medical Transportation Needs: Non-medical    -- -- -- -- --            Pertinent History     Question Response Comments    Lives with -- --    Place in Birth Order -- --    Lives in -- --    Number of Siblings -- --    Raised by -- --    Legal Involvement -- --    Childhood Trauma -- --    Criminal History of -- --    Financial Status -- --    Highest Level of Education -- --    Does patient have access to a firearm? -- --     Service -- --    Primary Leisure Activity -- --    Spirituality -- --        Past Medical History:   Diagnosis Date    Hypertension     MS (multiple sclerosis)     Nephrolithiasis      Past Surgical History:   Procedure Laterality Date    LITHOTRIPSY  2016    Large renal stone.     Family History     Problem Relation (Age of Onset)    Cancer Father    Hypertension Mother        Tobacco Use    Smoking status: Never Smoker    Smokeless tobacco: Never Used   Substance and Sexual Activity    Alcohol use: No    Drug use: No    Sexual activity: Yes     Partners: Male     Comment:      Review of patient's allergies indicates:   Allergen Reactions    Vancomycin analogues Itching       No current facility-administered medications on file prior to encounter.      Current Outpatient Medications on File Prior to Encounter   Medication Sig    acetaminophen (TYLENOL) 325 MG tablet Take 2 tablets (650 mg total) by mouth every 8 (eight) hours as needed for Temperature greater than (100).    amlodipine (NORVASC) 5 MG tablet Take 5 mg by mouth once daily.    ARIPiprazole (ABILIFY) 5 MG Tab Take 1 tablet (5 mg total) by mouth once daily.    escitalopram oxalate (LEXAPRO) 20 MG tablet TAKE 1 TABLET (20 MG TOTAL) BY MOUTH EVERY EVENING.    HYDROcodone-acetaminophen (NORCO) 5-325 mg per tablet Take 1 tablet by mouth every 8 (eight) hours as needed for Pain.    lorazepam (ATIVAN) 2 MG Tab Take 1 tablet (2 mg total) by mouth 3 (three) times daily as needed. (Patient taking  "differently: Take 2 mg by mouth 3 (three) times daily as needed. )    mirabegron (MYRBETRIQ) 25 mg Tb24 ER tablet Take 1 tablet (25 mg total) by mouth once daily.    OXcarbazepine (TRILEPTAL) 300 MG Tab TAKE 1 TABLET (300 MG TOTAL) BY MOUTH 2 (TWO) TIMES DAILY.    rosuvastatin (CRESTOR) 10 MG tablet Take 10 mg by mouth every evening.      Psychotherapeutics (From admission, onward)    Start     Stop Route Frequency Ordered    03/07/20 1523  lorazepam injection 1 mg      -- IV Every 4 hours PRN 03/07/20 1424    03/07/20 1515  buPROPion TB24 tablet 150 mg      -- Oral Daily 03/07/20 1404    03/06/20 2100  escitalopram oxalate tablet 20 mg      -- Oral Nightly 03/06/20 0556    03/06/20 0654  LORazepam tablet 2 mg      -- Oral Nightly PRN 03/06/20 0556        Review of Systems      Objective:     Vital Signs (Most Recent):  Temp: 97.4 °F (36.3 °C) (03/08/20 1050)  Pulse: 67 (03/08/20 1050)  Resp: 16 (03/08/20 1050)  BP: 126/79 (03/08/20 1050)  SpO2: (!) 94 % (03/08/20 1050) Vital Signs (24h Range):  Temp:  [97.4 °F (36.3 °C)-98.5 °F (36.9 °C)] 97.4 °F (36.3 °C)  Pulse:  [67-88] 67  Resp:  [16-20] 16  SpO2:  [93 %-97 %] 94 %  BP: (119-139)/(60-82) 126/79     Height: 5' 5" (165.1 cm)  Weight: 68 kg (150 lb)  Body mass index is 24.96 kg/m².      Intake/Output Summary (Last 24 hours) at 3/8/2020 1515  Last data filed at 3/8/2020 1315  Gross per 24 hour   Intake 480 ml   Output 100 ml   Net 380 ml       Physical Exam   Psychiatric:   Mental Status Exam:  Appearance: older than stated age, disheveled  Behavior/Cooperation: cooperative, odd  Speech: normal tone, normal rate, normal volume  Mood: depressed  Affect: blunted and oddly related  Thought Process: ruminative  Thought Content: normal, no suicidality, no homicidality, delusions, or paranoia, ruminations   Orientation: grossly intact  Memory: Impaired; did not know current president; 0/3 words on delayed recall  Attention Span/Concentration: Normal, passed " MARS  Insight: fair  Judgment: fair     Nursing note and vitals reviewed.         Significant Labs: All pertinent labs within the past 24 hours have been reviewed.    Significant Imaging: I have reviewed all pertinent imaging results/findings within the past 24 hours.

## 2020-03-08 NOTE — ASSESSMENT & PLAN NOTE
Assessment:  57 year old female admitted for mild MS flare, neuro thinking stress playing a part. Patient with worsening depression since bio father got custody of her 5 year old godchild she reports was like her baby.     Impression:  MDD, recurrent  R/o Complicated Bereavement    Plan:  No need for PEC,  agrees  Continue Lexapro 20 mg  Cont Wellbutrin  mg qday, patient provides informed consent (ordered placed with primary team consent)   Unclear how much Ativan patient typically takes  PRN ativan 1 mg q4h for benzo withdrawal with HR>95, DBP>95 (ordered placed with primary team consent)

## 2020-03-08 NOTE — PLAN OF CARE
CM sent HH orders to [facility] via [facility], pt accepted.    CM will follow-up and assist team as needed.    Lyla Szymanski RN  [id number]

## 2020-03-08 NOTE — ASSESSMENT & PLAN NOTE
History of MS without recent flare; pt of Dr. Zimmer. Has missed Ocrevus infusions for the last year or so but does say it usually improves her weakness and fatigue. Presents with nonfocal findings in the setting of increased fall frequency recently. To be evaluated by Neurology.    Worsening bilateral lower extremity weakness    Flare versus Pseudoflare    Workup non-revealing. Non-concerning for infection. MRI showing no new MS flare/acute findings. Labs thus far have returned normal. Likely secondary to depression/added stressors. Psychiatry added Wellbutrin. Neurology signed off with recommendations of following up with MS clinic. PT/OT recommending outpatient PT and OT.    Plan  -Neurology consulted and recommended obtaining MRI Brain w/ w/o contrast demyelinating protocol MRI C, T spine w/ w/o contrast demyelinating protocol, Vitamins B12 and E, Heavy metals, Copper, Ceruloplasmin, RPR, TSH, Hgb A1c, SPEP/KATIE. Psychiatry was also consulted for concerns for worsening depression.    -Psychiatry consulted for concerns of worsening depression which may be contributing to MS, missed treatment appointments, added stressors  -CXR, UA, CBC WNLs  -Fall precautions  -PT/OT
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
## 2020-03-08 NOTE — HOSPITAL COURSE
03/08/2020  Pt found resting awake in bed in NAD, oriented in all spheres, pleasant.  Endorses mild HA this morning but denies other s/e of wellbutring including insomnia in spite of receiving first does very late in day yesterday.  No further questions, concerns, or complaints at this time.  Appropriate esteem, future-oriented, denies SI.    3/9/2020   Patient seen lying in bed. Appeared calm and comfortable.  at bedside. Stated that she was not experiencing any increase in depressive symptoms. Had not noticed any benefit from the Wellbutrin yet but stated that it was still too early to tell. No acute concerns.

## 2020-03-08 NOTE — PROGRESS NOTES
[facility] Hospital Medicine  Progress Note    Patient Name: Deb Figueroa  MRN: [id number]  Patient Class: OP- Observation   Admission Date: 3/6/2020  Length of Stay: 0 days  Attending Physician: Rufino Pardo MD  Primary Care Provider: Jesus Thrasher MD    [facility] Medicine Team: [facility] [place] 3 Josh Prado MD    Subjective:     Principal Problem:Multiple sclerosis with acute neurologic event        HPI:  59F with hx MS (pt reports >20 years, pt of Dr. Zimmer) transferred for Neurology evaluation of LE weakness to determine if this represents a flare of her MS. She says that at baseline she has been in a wheelchair for the last few years and can stand when holding onto something for support, but otherwise cannot walk. She reports that 03/05 PM she noticed that she felt weaker and that her thighs felt sore. She has not had any recent worsening of diplopia (chronic, corrected by glasses), dysphagia, dysphonia, dyspnea, chest pain, hand weakness, coordination difficulty, fevers, chills, cough, n/v. Does have chronic diarrhea. She also says that 03/05 she fell 3 times: once when getting standing up to get past a step in her house, and twice from the toilet, when she was transferring to wheelchair. She did not lose consciousness or hit her head. No bowel or bladder incontinence.    At OSH she was noted to not be able to bear weight (no LE strength). Denies any bladder of bowel incontinence. Work up has been negative including CT; only her k was at 3.1 but this was replaced. She usually get Q6 month infusions of ocrelizumab but has missed her last two infusions with her last being in Feb 2019 (she says she just didn't feel like going to the appointments but does report that she usually gets better on it). Otherwise completely stable from a neurologic standpoint. Vitals stable.    Overview/Hospital Course:  Patient was admitted to  for workup of MS flare versus pseudoflare. Infectious workup  non-revealing: CXR, UA, no leukocytosis. Neurology consulted and recommended obtaining MRI Brain w/ w/o contrast demyelinating protocol MRI C, T spine w/ w/o contrast demyelinating protocol, Vitamins B12 and E, Heavy metals, Copper, Ceruloplasmin, RPR, TSH, Hgb A1c, SPEP/KATIE. Psychiatry was also consulted for concerns for worsening depression. Psychiatry started patient on Wellbutrin and recommended continuing Lexapro. MRI showed no signs of acute processes/new MS flare. Infection workup was negative. Neurology signed off with recommendations of follow up in MS clinic. PT/OT recommending outpatient PT and OT. Awaiting family decision regarding SNF versus home health PT/OT.     Interval History: NAEO. Patient states that she is improving. Able to transfer with assistance. Otherwise no complaints. Denies fevers/chills.    Review of Systems   Constitutional: Negative for chills and fever.   Respiratory: Negative for shortness of breath.    Cardiovascular: Negative for chest pain.   Neurological: Positive for weakness.     Objective:     Vital Signs (Most Recent):  Temp: 98.5 °F (36.9 °C) (03/08/20 0710)  Pulse: 88 (03/08/20 0710)  Resp: 18 (03/08/20 0710)  BP: 139/79 (03/08/20 0710)  SpO2: (!) 93 % (03/08/20 0710) Vital Signs (24h Range):  Temp:  [97.4 °F (36.3 °C)-98.5 °F (36.9 °C)] 98.5 °F (36.9 °C)  Pulse:  [75-88] 88  Resp:  [16-20] 18  SpO2:  [93 %-97 %] 93 %  BP: (119-139)/(60-82) 139/79     Weight: 68 kg (150 lb)  Body mass index is 24.96 kg/m².  No intake or output data in the 24 hours ending 03/08/20 0913   Physical Exam   Constitutional: She is oriented to person, place, and time. She appears well-developed and well-nourished. No distress.   Eyes: Conjunctivae are normal.   Neck: No JVD present.   Cardiovascular: Normal rate and regular rhythm.   Pulmonary/Chest: Effort normal.   Neurological: She is alert and oriented to person, place, and time. No cranial nerve deficit.   Bilateral LE muscle weakness    Psychiatric:   Blunted affect       Significant Labs: All pertinent labs within the past 24 hours have been reviewed.    Significant Imaging: I have reviewed and interpreted all pertinent imaging results/findings within the past 24 hours.      Assessment/Plan:      * Multiple sclerosis with acute neurologic event  History of MS without recent flare; pt of Dr. Zimmer. Has missed Ocrevus infusions for the last year or so but does say it usually improves her weakness and fatigue. Presents with nonfocal findings in the setting of increased fall frequency recently. To be evaluated by Neurology.    Worsening bilateral lower extremity weakness    Flare versus Pseudoflare    Workup non-revealing. Non-concerning for infection. MRI showing no new MS flare/acute findings. Labs thus far have returned normal. Likely secondary to depression/added stressors. Psychiatry added Wellbutrin. Neurology signed off with recommendations of following up with MS clinic. PT/OT recommending outpatient PT and OT.    Plan  -Neurology consulted and recommended obtaining MRI Brain w/ w/o contrast demyelinating protocol MRI C, T spine w/ w/o contrast demyelinating protocol, Vitamins B12 and E, Heavy metals, Copper, Ceruloplasmin, RPR, TSH, Hgb A1c, SPEP/KATIE. Psychiatry was also consulted for concerns for worsening depression.    -Psychiatry consulted for concerns of worsening depression which may be contributing to MS, missed treatment appointments, added stressors  -CXR, UA, CBC WNLs  -Fall precautions  -PT/OT      Depression  -Psychiatry consulted for concerns of worsening depression which may be contributing to MS, missed treatment appointments, added stressors  -Started on Wellbutrin per Psych  -Cont home escitalopram    Tobacco use disorder  -counseled on smoking cessation  -nicotine patch      Lower extremity weakness  See Multiple Sclerosis      Hyperlipidemia  Cont home statin    Hypertension  Cont home amlodipine        VTE Risk  Mitigation (From admission, onward)         Ordered     IP VTE LOW RISK PATIENT  Once      03/06/20 0556                      Josh Prado MD  Department of Hospital Medicine   [facility]

## 2020-03-08 NOTE — PLAN OF CARE
Plan of care reviewed with pt. Pt aox4, VS as charted. Purposeful rounding for pt care and safety. No reports of pain, nausea. No falls/injury reported this shift. Skin integrity intact. Safety precautions maintained - bed in low position, call light in reach, side rails up x2.

## 2020-03-08 NOTE — ASSESSMENT & PLAN NOTE
-Psychiatry consulted for concerns of worsening depression which may be contributing to MS, missed treatment appointments, added stressors  -Started on Wellbutrin per Psych  -Cont home escitalopram

## 2020-03-09 ENCOUNTER — TELEPHONE (OUTPATIENT)
Dept: NEUROLOGY | Facility: CLINIC | Age: 60
End: 2020-03-09

## 2020-03-09 VITALS
WEIGHT: 150 LBS | HEART RATE: 74 BPM | DIASTOLIC BLOOD PRESSURE: 87 MMHG | RESPIRATION RATE: 16 BRPM | BODY MASS INDEX: 24.99 KG/M2 | SYSTOLIC BLOOD PRESSURE: 147 MMHG | HEIGHT: 65 IN | TEMPERATURE: 97 F | OXYGEN SATURATION: 98 %

## 2020-03-09 LAB
ALBUMIN SERPL BCP-MCNC: 3.6 G/DL (ref 3.5–5.2)
ALP SERPL-CCNC: 96 U/L (ref 55–135)
ALT SERPL W/O P-5'-P-CCNC: 17 U/L (ref 10–44)
ANION GAP SERPL CALC-SCNC: 11 MMOL/L (ref 8–16)
ARSENIC BLD-MCNC: 1 NG/ML (ref 0–12)
AST SERPL-CCNC: 25 U/L (ref 10–40)
BASOPHILS # BLD AUTO: 0.07 K/UL (ref 0–0.2)
BASOPHILS NFR BLD: 0.8 % (ref 0–1.9)
BILIRUB SERPL-MCNC: 0.4 MG/DL (ref 0.1–1)
BUN SERPL-MCNC: 10 MG/DL (ref 6–20)
CADMIUM BLD-MCNC: 0.6 NG/ML (ref 0–4.9)
CALCIUM SERPL-MCNC: 9 MG/DL (ref 8.7–10.5)
CHLORIDE SERPL-SCNC: 108 MMOL/L (ref 95–110)
CITY: NORMAL
CO2 SERPL-SCNC: 24 MMOL/L (ref 23–29)
COPPER SERPL-MCNC: 899 UG/L (ref 810–1990)
COUNTY: NORMAL
CREAT SERPL-MCNC: 0.8 MG/DL (ref 0.5–1.4)
DIFFERENTIAL METHOD: ABNORMAL
EOSINOPHIL # BLD AUTO: 0.3 K/UL (ref 0–0.5)
EOSINOPHIL NFR BLD: 3.6 % (ref 0–8)
ERYTHROCYTE [DISTWIDTH] IN BLOOD BY AUTOMATED COUNT: 13.1 % (ref 11.5–14.5)
EST. GFR  (AFRICAN AMERICAN): >60 ML/MIN/1.73 M^2
EST. GFR  (NON AFRICAN AMERICAN): >60 ML/MIN/1.73 M^2
GLUCOSE SERPL-MCNC: 86 MG/DL (ref 70–110)
GUARDIAN FIRST NAME: NORMAL
GUARDIAN LAST NAME: NORMAL
HBV CORE AB SERPL QL IA: NEGATIVE
HBV SURFACE AB SER-ACNC: NEGATIVE M[IU]/ML
HBV SURFACE AG SERPL QL IA: NEGATIVE
HCT VFR BLD AUTO: 41.8 % (ref 37–48.5)
HGB BLD-MCNC: 13.1 G/DL (ref 12–16)
HOME PHONE: NORMAL
IMM GRANULOCYTES # BLD AUTO: 0.03 K/UL (ref 0–0.04)
IMM GRANULOCYTES NFR BLD AUTO: 0.3 % (ref 0–0.5)
INTERPRETATION SERPL IFE-IMP: NORMAL
LEAD BLD-MCNC: <1 MCG/DL (ref 0–4.9)
LYMPHOCYTES # BLD AUTO: 2.7 K/UL (ref 1–4.8)
LYMPHOCYTES NFR BLD: 29.9 % (ref 18–48)
MAGNESIUM SERPL-MCNC: 2.1 MG/DL (ref 1.6–2.6)
MCH RBC QN AUTO: 27.6 PG (ref 27–31)
MCHC RBC AUTO-ENTMCNC: 31.3 G/DL (ref 32–36)
MCV RBC AUTO: 88 FL (ref 82–98)
MERCURY BLD-MCNC: <1 NG/ML (ref 0–9)
MONOCYTES # BLD AUTO: 0.7 K/UL (ref 0.3–1)
MONOCYTES NFR BLD: 8.2 % (ref 4–15)
NEUTROPHILS # BLD AUTO: 5.1 K/UL (ref 1.8–7.7)
NEUTROPHILS NFR BLD: 57.2 % (ref 38–73)
NRBC BLD-RTO: 0 /100 WBC
PHOSPHATE SERPL-MCNC: 3.6 MG/DL (ref 2.7–4.5)
PLATELET # BLD AUTO: 281 K/UL (ref 150–350)
PMV BLD AUTO: 12.1 FL (ref 9.2–12.9)
POTASSIUM SERPL-SCNC: 3.6 MMOL/L (ref 3.5–5.1)
PROT SERPL-MCNC: 6.9 G/DL (ref 6–8.4)
RACE: NORMAL
RBC # BLD AUTO: 4.74 M/UL (ref 4–5.4)
SODIUM SERPL-SCNC: 143 MMOL/L (ref 136–145)
STATE: NORMAL
STREET ADDRESS: NORMAL
VENOUS/CAPILLARY: NORMAL
WBC # BLD AUTO: 8.92 K/UL (ref 3.9–12.7)
ZIP: NORMAL

## 2020-03-09 PROCEDURE — 25000003 PHARM REV CODE 250: Performed by: STUDENT IN AN ORGANIZED HEALTH CARE EDUCATION/TRAINING PROGRAM

## 2020-03-09 PROCEDURE — S4991 NICOTINE PATCH NONLEGEND: HCPCS | Performed by: STUDENT IN AN ORGANIZED HEALTH CARE EDUCATION/TRAINING PROGRAM

## 2020-03-09 PROCEDURE — 85025 COMPLETE CBC W/AUTO DIFF WBC: CPT

## 2020-03-09 PROCEDURE — 97116 GAIT TRAINING THERAPY: CPT

## 2020-03-09 PROCEDURE — G0378 HOSPITAL OBSERVATION PER HR: HCPCS

## 2020-03-09 PROCEDURE — 99217 PR OBSERVATION CARE DISCHARGE: CPT | Mod: ,,,

## 2020-03-09 PROCEDURE — 99232 SBSQ HOSP IP/OBS MODERATE 35: CPT | Mod: ,,, | Performed by: PSYCHIATRY & NEUROLOGY

## 2020-03-09 PROCEDURE — 99217 PR OBSERVATION CARE DISCHARGE: ICD-10-PCS | Mod: ,,,

## 2020-03-09 PROCEDURE — 84100 ASSAY OF PHOSPHORUS: CPT

## 2020-03-09 PROCEDURE — 97542 WHEELCHAIR MNGMENT TRAINING: CPT

## 2020-03-09 PROCEDURE — 80053 COMPREHEN METABOLIC PANEL: CPT

## 2020-03-09 PROCEDURE — 83735 ASSAY OF MAGNESIUM: CPT

## 2020-03-09 PROCEDURE — 36415 COLL VENOUS BLD VENIPUNCTURE: CPT

## 2020-03-09 PROCEDURE — 99232 PR SUBSEQUENT HOSPITAL CARE,LEVL II: ICD-10-PCS | Mod: ,,, | Performed by: PSYCHIATRY & NEUROLOGY

## 2020-03-09 RX ADMIN — AMLODIPINE BESYLATE 5 MG: 5 TABLET ORAL at 08:03

## 2020-03-09 RX ADMIN — VITAMIN D, TAB 1000IU (100/BT) 1000 UNITS: 25 TAB at 08:03

## 2020-03-09 RX ADMIN — BUPROPION HYDROCHLORIDE 150 MG: 150 TABLET, FILM COATED, EXTENDED RELEASE ORAL at 08:03

## 2020-03-09 RX ADMIN — NICOTINE 1 PATCH: 7 PATCH TRANSDERMAL at 08:03

## 2020-03-09 NOTE — NURSING
Discharge confirmed with med team 3,  D/C instructions reviewed with the pt and the family, both verbalized understanding. Belongings are with the pt, spouse will take the pt down via wheelchair on his own.

## 2020-03-09 NOTE — PLAN OF CARE
03/09/20 1435   Final Note   Assessment Type Final Discharge Note   Anticipated Discharge Disposition Home-Health   What phone number can be called within the next 1-3 days to see how you are doing after discharge? [phone]   Discharge plans and expectations educations in teach back method with documentation complete? Yes   Right Care Referral Info   Post Acute Recommendation Home-care   Facility Name [facility]

## 2020-03-09 NOTE — DISCHARGE SUMMARY
[facility] Hospital Medicine  Discharge Summary      Patient Name: Deb Figueroa  MRN: [id number]  Admission Date: 3/6/2020  Hospital Length of Stay: 0 days  Discharge Date and Time: 3/9/2020  3:30 PM  Attending Physician: No att. providers found   Discharging Provider: Shahid Estrada MD  Primary Care Provider: Jesus Thrasher MD  [facility] Medicine [place] [facility] [place] [place] Shahid Estrada MD    HPI:   59F with hx MS (pt reports >20 years, pt of Dr. Zimmer) transferred for Neurology evaluation of LE weakness to determine if this represents a flare of her MS. She says that at baseline she has been in a wheelchair for the last few years and can stand when holding onto something for support, but otherwise cannot walk. She reports that 03/05 PM she noticed that she felt weaker and that her thighs felt sore. She has not had any recent worsening of diplopia (chronic, corrected by glasses), dysphagia, dysphonia, dyspnea, chest pain, hand weakness, coordination difficulty, fevers, chills, cough, n/v. Does have chronic diarrhea. She also says that 03/05 she fell 3 times: once when getting standing up to get past a step in her house, and twice from the toilet, when she was transferring to wheelchair. She did not lose consciousness or hit her head. No bowel or bladder incontinence.    At OSH she was noted to not be able to bear weight (no LE strength). Denies any bladder of bowel incontinence. Work up has been negative including CT; only her k was at 3.1 but this was replaced. She usually get Q6 month infusions of ocrelizumab but has missed her last two infusions with her last being in Feb 2019 (she says she just didn't feel like going to the appointments but does report that she usually gets better on it). Otherwise completely stable from a neurologic standpoint. Vitals stable.    * No surgery found *      Hospital Course:   Patient was admitted to  for workup of MS flare versus pseudoflare. Infectious workup  non-revealing: CXR, UA, no leukocytosis. Neurology consulted and recommended obtaining MRI BRAIN/C/T SPINE, vitamin B12 and E, heavy metals screem, copper, ceruloplasmin, RPR, TSH, Hgb A1c, SPEP/KATIE. Psychiatry was also consulted for concerns for worsening depression - started patient on Wellbutrin and recommended continuing Lexapro. MRI showed no signs of acute processes/new MS flare. Infection workup was negative. Neurology signed off with recommendations of follow up in MS clinic. PT/OT recommending outpatient PT and OT, but NEUROLOGY wanted to see if candidate for SNF. Ultimately decided on home with Home Health.     Vitals:    03/09/20 0411 03/09/20 0810 03/09/20 1153 03/09/20 1500   BP: 117/62 (!) 145/75 135/70 (!) 147/87   BP Location: Right arm Right arm Right arm Right arm   Patient Position: Lying Lying Lying    Pulse: 79 74 74    Resp: 16 18 16    Temp: 97.9 °F (36.6 °C) 97.6 °F (36.4 °C) 96.8 °F (36 °C)    TempSrc: Oral Oral Oral    SpO2: 95% 95% 98%    Weight:       Height:         Physical Exam   Constitutional: She is oriented to person, place, and time and well-developed, well-nourished, and in no distress. No distress.   HENT:   Head: Normocephalic and atraumatic.   Eyes: Conjunctivae are normal. No scleral icterus.   Cardiovascular: Normal rate and regular rhythm. Exam reveals no gallop and no friction rub.   No murmur heard.  Pulmonary/Chest: Effort normal. No respiratory distress. She has no wheezes. She has no rales.   Abdominal: Soft. She exhibits no distension. There is no tenderness.   Musculoskeletal: Normal range of motion. She exhibits no edema.   Neurological: She is alert and oriented to person, place, and time.   Mild BLE weakness   Skin: Skin is warm and dry. She is not diaphoretic.       Consults:   Consults (From admission, onward)        Status Ordering Provider     Inpatient consult to Psychiatry  Once     Provider:  (Not yet assigned)    Completed EZEQUIEL GUTIÉRREZ  Active Diagnoses:    Diagnosis Date Noted POA    PRINCIPAL PROBLEM:  Multiple sclerosis with acute neurologic event [G35] 11/07/2012 Yes    Tobacco use disorder [F17.200] 03/07/2020 Yes    Lower extremity weakness [R29.898] 03/06/2020 Yes    Depression [F32.9] 03/06/2020 Yes    Hyperlipidemia [E78.5] 09/03/2018 Yes    Hypertension [I10] 11/28/2016 Yes      Problems Resolved During this Admission:       Discharged Condition: stable    Disposition: Home or Self Care    Follow Up:    Patient Instructions:   No discharge procedures on file.    Significant Diagnostic Studies: Labs:   CMP   Recent Labs   Lab 03/08/20  0508 03/09/20  0523    143   K 3.3* 3.6    108   CO2 24 24    86   BUN 13 10   CREATININE 0.7 0.8   CALCIUM 8.9 9.0   PROT 7.1 6.9   ALBUMIN 3.7 3.6   BILITOT 0.3 0.4   ALKPHOS 92 96   AST 21 25   ALT 16 17   ANIONGAP 9 11   ESTGFRAFRICA >60.0 >60.0   EGFRNONAA >60.0 >60.0    and CBC   Recent Labs   Lab 03/08/20  0508 03/09/20  0523   WBC 11.33 8.92   HGB 13.3 13.1   HCT 42.6 41.8    281       Pending Diagnostic Studies:     Procedure Component Value Units Date/Time    Heavy Metals Screen, Blood (Quantitative) [[id number]] Collected:  03/07/20 0726    Order Status:  Sent Lab Status:  In process Updated:  03/07/20 0745    Specimen:  Blood     Vitamin E [[id number]] Collected:  03/07/20 0726    Order Status:  Sent Lab Status:  In process Updated:  03/07/20 0745    Specimen:  Blood          Medications:  Reconciled Home Medications:      Medication List      START taking these medications    buPROPion 150 MG TB24 tablet  Commonly known as:  WELLBUTRIN XL  Take 1 tablet (150 mg total) by mouth once daily.        CONTINUE taking these medications    acetaminophen 325 MG tablet  Commonly known as:  TYLENOL  Take 2 tablets (650 mg total) by mouth every 8 (eight) hours as needed for Temperature greater than (100).     amLODIPine 5 MG tablet  Commonly known as:  NORVASC  Take 5 mg by  mouth once daily.     ARIPiprazole 5 MG Tab  Commonly known as:  ABILIFY  Take 1 tablet (5 mg total) by mouth once daily.     escitalopram oxalate 20 MG tablet  Commonly known as:  LEXAPRO  TAKE 1 TABLET (20 MG TOTAL) BY MOUTH EVERY EVENING.     HYDROcodone-acetaminophen 5-325 mg per tablet  Commonly known as:  NORCO  Take 1 tablet by mouth every 8 (eight) hours as needed for Pain.     LORazepam 2 MG Tab  Commonly known as:  ATIVAN  Take 1 tablet (2 mg total) by mouth 3 (three) times daily as needed.     mirabegron 25 mg Tb24 ER tablet  Commonly known as:  MYRBETRIQ  Take 1 tablet (25 mg total) by mouth once daily.     OXcarbazepine 300 MG Tab  Commonly known as:  TRILEPTAL  TAKE 1 TABLET (300 MG TOTAL) BY MOUTH 2 (TWO) TIMES DAILY.     rosuvastatin 10 MG tablet  Commonly known as:  CRESTOR  Take 10 mg by mouth every evening.            Indwelling Lines/Drains at time of discharge:   Lines/Drains/Airways     None                 Time spent on the discharge of patient: 50 minutes  Patient was seen and examined on the date of discharge and determined to be suitable for discharge.         Shahid Estrada MD  Department of Hospital Medicine  [facility]

## 2020-03-09 NOTE — PLAN OF CARE
Problem: Physical Therapy Goal  Goal: Physical Therapy Goal  Description  Goals to be met by: 3/20/20     Patient will increase functional independence with mobility by performin. Supine to sit with Moderate Assistance. (Met 3/9/20)   Revised: Supine to sit with Supervision - not met  2. Sit to supine with Minimal Assistance. (Met 3/9/20)   Revised: Sit to supine with supervision - not met  3. Sit to stand transfer with Contact Guard Assistance. (Met 3/9/20)   Revised: Sit to Stand transfer with Supervision - not met  4. Bed to chair transfer with Minimal Assistance using Rolling Walker. (Met 3/9/20)   Revised: Bed to chair transfer with SBA and RW  5. Gait  x 10 feet with Minimal Assistance using Rolling Walker. (Met 3/9/20)   Revised: Amb 50' with min A and RW  6. Lower extremity exercise program x 20 reps per handout, with assistance as needed.      Outcome: Ongoing, Progressing    Goals met, revised, and now appropriate.     Bahman Galan, SPT  3/9/2020

## 2020-03-09 NOTE — PT/OT/SLP PROGRESS
Physical Therapy Treatment    Patient Name:  Deb Figueroa   MRN:  [id number]    Recommendations:     Discharge Recommendations:  outpatient PT   Discharge Equipment Recommendations: none   Barriers to discharge: None    Assessment:     Deb Figueroa is a 59 y.o. female admitted with a medical diagnosis of Multiple sclerosis with acute neurologic event.  She presents with the following impairments/functional limitations:  weakness, impaired endurance, impaired functional mobilty, impaired balance, gait instability, decreased lower extremity function. Pt tolerated treatment well, which consisted of bed mobility, transfers, w/c mobility, and gait training with RW. Pt will continue to need skilled PT services in order to increase strength, endurance, overall amb/mobility with both a w/c and RW. Upon discharge, pt is recommended to seek OPPT to continue to strengthen her LEs and practice gait training with RW, which she should obtain when strong enough to amb farther distances with less assistance.    Rehab Prognosis: Good; patient would benefit from acute skilled PT services to address these deficits and reach maximum level of function.    Recent Surgery: * No surgery found *      Plan:     During this hospitalization, patient to be seen 2 x/week to address the identified rehab impairments via gait training, therapeutic activities, therapeutic exercises, neuromuscular re-education and progress toward the following goals:    · Plan of Care Expires:  04/03/20    Subjective     Chief Complaint: None: up to try whatever was asked of her  Patient/Family Comments/goals: to be able to walk without an AD  Pain/Comfort:  · Pain Rating 1: (none reported)    Pt was confused about discharge planning: she believed she was going to an in-patient rehab [facility].   Objective:     Communicated with nurse prior to session.  Patient found supine with telemetry(hep lock IV) upon PT entry to room.     General Precautions: Standard, fall    Orthopedic Precautions:N/A   Braces: N/A     Functional Mobility:  · Bed Mobility:     · Rolling Right: stand by assistance  · Scooting: stand by assistance  · Supine to Sit: stand by assistance  · Transfers:     · Sit to Stand:  minimum assistance with stand pivot transfer with assist of PT via gait belt  · Gait: amb 38' with RW and Mod A. Pt demonstrated decreased gait speed, decreased foot clearance, moderate right lateral lean, and step-through gait pattern. Pt demonstrated good safety awareness as she let us know when she was fatigued then sat down in the w/c following.  · Balance:   · Sitting: fair  · Standing: fair  · While amb: poor+  · Wheelchair Propulsion:  Pt propelled Standard wheelchair x 124 feet on Level tile with  Bilateral upper extremity with Stand-by Assistance and VC on w/c operation as pt had never been taught before. W/c propulsion was performed before gait training.       AM-PAC 6 CLICK MOBILITY  Turning over in bed (including adjusting bedclothes, sheets and blankets)?: 4  Sitting down on and standing up from a chair with arms (e.g., wheelchair, bedside commode, etc.): 3  Moving from lying on back to sitting on the side of the bed?: 4  Moving to and from a bed to a chair (including a wheelchair)?: 3(with stand-pivot transfer)  Need to walk in hospital room?: 2  Climbing 3-5 steps with a railing?: 2  Basic Mobility Total Score: 18       Therapeutic Activities and Exercises:   Educated pt on proper amb with RW and proper mobility with w/c. Discussed discharge recommendations, POC, DME needs, and PT goals with pt. She gave verbal understanding of such.     Patient left up in wheelchair with all lines intact, call button in reach and and reminded to call nurse before transfering back to bed.    GOALS:   Multidisciplinary Problems     Physical Therapy Goals        Problem: Physical Therapy Goal    Goal Priority Disciplines Outcome Goal Variances Interventions   Physical Therapy Goal     PT,  PT/OT Ongoing, Progressing     Description:  Goals to be met by: 3/20/20     Patient will increase functional independence with mobility by performin. Supine to sit with Moderate Assistance. (Met 3/9/20)   Revised: Supine to sit with Supervision - not met  2. Sit to supine with Minimal Assistance. (Met 3/9/20)   Revised: Sit to supine with supervision - not met  3. Sit to stand transfer with Contact Guard Assistance. (Met 3/9/20)   Revised: Sit to Stand transfer with Supervision - not met  4. Bed to chair transfer with Minimal Assistance using Rolling Walker. (Met 3/9/20)   Revised: Bed to chair transfer with SBA and RW  5. Gait  x 10 feet with Minimal Assistance using Rolling Walker. (Met 3/9/20)   Revised: Amb 50' with min A and RW  6. Lower extremity exercise program x 20 reps per handout, with assistance as needed.                       Time Tracking:     PT Received On: 20  PT Start Time: 1100     PT Stop Time: 1131  PT Total Time (min): 31 min     Billable Minutes: Gait Training 12 and Train/Wheelchair Management 19    Treatment Type: Treatment  PT/PTA: PT     PTA Visit Number: 0     MARY Ramon  2020

## 2020-03-09 NOTE — ASSESSMENT & PLAN NOTE
Assessment:  57 year old female admitted for mild MS flare, neuro thinking stress playing a part. Patient with worsening depression since bio father got custody of her 5 year old godchild she reports was like her baby.     Impression:  MDD, recurrent    Plan:  No need for PEC,  agrees  Continue Lexapro 20 mg  Cont Wellbutrin  mg qday  Unclear how much Ativan patient typically takes. Reported Ativan 2 mg qhs at home but prescribed Ativan 2 mg TID from PCP. Was advised to cut down to help with memory issues.   PRN ativan 1 mg q4h for benzo withdrawal with HR>95, DBP>95     [facility] Psychiatry will now sign off. Please page our team for any additional questions or concerns.

## 2020-03-09 NOTE — TELEPHONE ENCOUNTER
----- Message from Meghana Zimmer MD sent at 3/8/2020  1:30 PM CDT -----  FYBASIM, Ms Figueroa was in hospital at [facility] over the weekend; pseudoexacerbation from depression; it's been over a year since Ocrevus (maybe over 1.5 years).  I'm having her sign paperwork today--will give to you;  Hep labs and CBC done in hospital;  Let's schedule soon;   Thanks

## 2020-03-09 NOTE — PLAN OF CARE
Plan of care, treatment and medications reviewed with patient. Understanding verbalized. AAOx4, verbalizes needs. Denies pain at present. Safety/Fall precautions maintained. NADN. No acute changes noted. VSS. Will continue to monitor. Rehab placement pending discharge.
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
## 2020-03-09 NOTE — SUBJECTIVE & OBJECTIVE
"Interval History: See as above     Family History     Problem Relation (Age of Onset)    Cancer Father    Hypertension Mother        Tobacco Use    Smoking status: Never Smoker    Smokeless tobacco: Never Used   Substance and Sexual Activity    Alcohol use: No    Drug use: No    Sexual activity: Yes     Partners: Male     Comment:      Psychotherapeutics (From admission, onward)    Start     Stop Route Frequency Ordered    03/07/20 1523  lorazepam injection 1 mg      -- IV Every 4 hours PRN 03/07/20 1424    03/07/20 1515  buPROPion TB24 tablet 150 mg      -- Oral Daily 03/07/20 1404    03/06/20 2100  escitalopram oxalate tablet 20 mg      -- Oral Nightly 03/06/20 0556    03/06/20 0654  LORazepam tablet 2 mg      -- Oral Nightly PRN 03/06/20 0556           Review of Systems  Objective:     Vital Signs (Most Recent):  Temp: 97.6 °F (36.4 °C) (03/09/20 0810)  Pulse: 74 (03/09/20 0810)  Resp: 18 (03/09/20 0810)  BP: (!) 145/75 (03/09/20 0810)  SpO2: 95 % (03/09/20 0810) Vital Signs (24h Range):  Temp:  [97.3 °F (36.3 °C)-98.2 °F (36.8 °C)] 97.6 °F (36.4 °C)  Pulse:  [71-79] 74  Resp:  [15-18] 18  SpO2:  [95 %-98 %] 95 %  BP: (117-145)/(62-75) 145/75     Height: 5' 5" (165.1 cm)  Weight: 68 kg (150 lb)  Body mass index is 24.96 kg/m².      Intake/Output Summary (Last 24 hours) at 3/9/2020 1128  Last data filed at 3/8/2020 2045  Gross per 24 hour   Intake 120 ml   Output 100 ml   Net 20 ml       Physical Exam   Psychiatric:   Mental Status Exam:  Appearance: older than stated age, disheveled  Behavior/Cooperation: cooperative  Speech: normal tone, normal rate, normal volume  Mood: depressed  Affect: constricted   Thought Process: ruminative  Thought Content: normal, no suicidality, no homicidality, delusions, or paranoia, ruminations   Orientation: grossly intact  Memory: Impaired; did not know current president; 0/3 words on delayed recall  Attention Span/Concentration: Normal, [facility]  Insight: " fair  Judgment: fair        Significant Labs: All pertinent labs within the past 24 hours have been reviewed.    Significant Imaging: I have reviewed all pertinent imaging results/findings within the past 24 hours.

## 2020-03-10 LAB
A-TOCOPHEROL VIT E SERPL-MCNC: 1209 UG/DL (ref 500–1800)
ALBUMIN SERPL ELPH-MCNC: 3.63 G/DL (ref 3.35–5.55)
ALPHA1 GLOB SERPL ELPH-MCNC: 0.28 G/DL (ref 0.17–0.41)
ALPHA2 GLOB SERPL ELPH-MCNC: 0.71 G/DL (ref 0.43–0.99)
B-GLOBULIN SERPL ELPH-MCNC: 0.79 G/DL (ref 0.5–1.1)
GAMMA GLOB SERPL ELPH-MCNC: 0.99 G/DL (ref 0.67–1.58)
PATHOLOGIST INTERPRETATION IFE: NORMAL
PATHOLOGIST INTERPRETATION SPE: NORMAL
PROT SERPL-MCNC: 6.4 G/DL (ref 6–8.4)

## 2020-03-25 ENCOUNTER — OFFICE VISIT (OUTPATIENT)
Dept: NEUROLOGY | Facility: CLINIC | Age: 60
End: 2020-03-25
Payer: COMMERCIAL

## 2020-03-25 DIAGNOSIS — Z79.899 ENCOUNTER FOR LONG-TERM (CURRENT) USE OF HIGH-RISK MEDICATION: ICD-10-CM

## 2020-03-25 DIAGNOSIS — G35 MULTIPLE SCLEROSIS: Primary | ICD-10-CM

## 2020-03-25 DIAGNOSIS — F32.A DEPRESSION, UNSPECIFIED DEPRESSION TYPE: ICD-10-CM

## 2020-03-25 DIAGNOSIS — R26.9 NEUROLOGIC GAIT DYSFUNCTION: ICD-10-CM

## 2020-03-25 DIAGNOSIS — G82.20 PARAPLEGIA: ICD-10-CM

## 2020-03-25 DIAGNOSIS — G50.0 TRIGEMINAL NEURALGIA: ICD-10-CM

## 2020-03-25 DIAGNOSIS — I10 ESSENTIAL HYPERTENSION: ICD-10-CM

## 2020-03-25 PROBLEM — A41.9 SEPSIS SECONDARY TO UTI: Status: RESOLVED | Noted: 2018-09-03 | Resolved: 2020-03-25

## 2020-03-25 PROBLEM — N39.0 SEPSIS SECONDARY TO UTI: Status: RESOLVED | Noted: 2018-09-03 | Resolved: 2020-03-25

## 2020-03-25 PROCEDURE — 99214 OFFICE O/P EST MOD 30 MIN: CPT | Mod: 95,,, | Performed by: PHYSICIAN ASSISTANT

## 2020-03-25 PROCEDURE — 99214 PR OFFICE/OUTPT VISIT, EST, LEVL IV, 30-39 MIN: ICD-10-PCS | Mod: 95,,, | Performed by: PHYSICIAN ASSISTANT

## 2020-03-26 ENCOUNTER — TELEPHONE (OUTPATIENT)
Dept: NEUROLOGY | Facility: CLINIC | Age: 60
End: 2020-03-26

## 2020-05-25 ENCOUNTER — PATIENT MESSAGE (OUTPATIENT)
Dept: NEUROLOGY | Facility: CLINIC | Age: 60
End: 2020-05-25

## 2020-05-27 ENCOUNTER — OFFICE VISIT (OUTPATIENT)
Dept: NEUROLOGY | Facility: CLINIC | Age: 60
End: 2020-05-27
Payer: COMMERCIAL

## 2020-05-27 DIAGNOSIS — Z71.89 COUNSELING REGARDING GOALS OF CARE: ICD-10-CM

## 2020-05-27 DIAGNOSIS — Z91.81 AT HIGH RISK FOR FALLS: ICD-10-CM

## 2020-05-27 DIAGNOSIS — R26.9 NEUROLOGIC GAIT DYSFUNCTION: ICD-10-CM

## 2020-05-27 DIAGNOSIS — G82.20 PARAPARESIS: ICD-10-CM

## 2020-05-27 DIAGNOSIS — G35 MULTIPLE SCLEROSIS: Primary | ICD-10-CM

## 2020-05-27 DIAGNOSIS — G50.0 TRIGEMINAL NEURALGIA: ICD-10-CM

## 2020-05-27 DIAGNOSIS — N31.9 NEUROGENIC BLADDER: ICD-10-CM

## 2020-05-27 PROCEDURE — 99214 PR OFFICE/OUTPT VISIT, EST, LEVL IV, 30-39 MIN: ICD-10-PCS | Mod: 95,,, | Performed by: PHYSICIAN ASSISTANT

## 2020-05-27 PROCEDURE — 99214 OFFICE O/P EST MOD 30 MIN: CPT | Mod: 95,,, | Performed by: PHYSICIAN ASSISTANT

## 2020-05-27 RX ORDER — OXYBUTYNIN CHLORIDE 5 MG/1
5 TABLET, EXTENDED RELEASE ORAL DAILY
Qty: 30 TABLET | Refills: 5 | Status: SHIPPED | OUTPATIENT
Start: 2020-05-27 | End: 2021-04-05

## 2020-05-27 NOTE — PATIENT INSTRUCTIONS
Oxybutynin extended-release tablets  What is this medicine?  OXYBUTYNIN (ox i BYOO ti tatum) is used to treat overactive bladder. This medicine reduces the amount of bathroom visits. It may also help to control wetting accidents.  How should I use this medicine?  Take this medicine by mouth with a glass of water. Swallow whole, do not crush, cut, or chew. Follow the directions on the prescription label. You can take this medicine with or without food. Take your doses at regular intervals. Do not take your medicine more often than directed.  Talk to your pediatrician regarding the use of this medicine in children. Special care may be needed. While this drug may be prescribed for children as young as 6 years for selected conditions, precautions do apply.  What side effects may I notice from receiving this medicine?  Side effects that you should report to your doctor or health care professional as soon as possible:  · allergic reactions like skin rash, itching or hives, swelling of the face, lips, or tongue  · agitation  · breathing problems  · confusion  · fever  · flushing (reddening of the skin)  · hallucinations  · memory loss  · pain or difficulty passing urine  · palpitations  · unusually weak or tired  Side effects that usually do not require medical attention (report to your doctor or health care professional if they continue or are bothersome):  · constipation  · headache  · sexual difficulties (impotence)  What may interact with this medicine?  · antihistamines for allergy, cough and cold  · atropine  · certain medicines for bladder problems like oxybutynin, tolterodine  · certain medicines for Parkinson's disease like benztropine, trihexyphenidyl  · certain medicines for stomach problems like dicyclomine, hyoscyamine  · certain medicines for travel sickness like scopolamine  · clarithromycin  · erythromycin  · ipratropium  · medicines for fungal infections, like fluconazole, itraconazole, ketoconazole or  voriconazole  What if I miss a dose?  If you miss a dose, take it as soon as you can. If it is almost time for your next dose, take only that dose. Do not take double or extra doses.  Where should I keep my medicine?  Keep out of the reach of children.  Store at room temperature between 15 and 30 degrees C (59 and 86 degrees F). Protect from moisture and humidity. Throw away any unused medicine after the expiration date.  What should I tell my health care provider before I take this medicine?  They need to know if you have any of these conditions:  · autonomic neuropathy  · dementia  · difficulty passing urine  · glaucoma  · intestinal obstruction  · kidney disease  · liver disease  · myasthenia gravis  · Parkinson's disease  · an unusual or allergic reaction to oxybutynin, other medicines, foods, dyes, or preservatives  · pregnant or trying to get pregnant  · breast-feeding  What should I watch for while using this medicine?  It may take a few weeks to notice the full benefit from this medicine.  You may need to limit your intake tea, coffee, caffeinated sodas, and alcohol. These drinks may make your symptoms worse.  You may get drowsy or dizzy. Do not drive, use machinery, or do anything that needs mental alertness until you know how this medicine affects you. Do not stand or sit up quickly, especially if you are an older patient. This reduces the risk of dizzy or fainting spells. Alcohol may interfere with the effect of this medicine. Avoid alcoholic drinks.  Your mouth may get dry. Chewing sugarless gum or sucking hard candy, and drinking plenty of water may help. Contact your doctor if the problem does not go away or is severe.  This medicine may cause dry eyes and blurred vision. If you wear contact lenses, you may feel some discomfort. Lubricating drops may help. See your eyecare professional if the problem does not go away or is severe.  You may notice the shells of the tablets in your stool from time to  time. This is normal.  Avoid extreme heat. This medicine can cause you to sweat less than normal. Your body temperature could increase to dangerous levels, which may lead to heat stroke.  NOTE:This sheet is a summary. It may not cover all possible information. If you have questions about this medicine, talk to your doctor, pharmacist, or health care provider. Copyright© 2017 [employer]

## 2020-05-27 NOTE — PROGRESS NOTES
Subjective:          Patient ID: Deb Figueroa is a 59 y.o. female who presents today with   for a routine  Video visit for MS.  [facility] visit to [facility] in March 2020 with this provider.    MS HPI:  · DMT: None-has not started Aubagio  · Side effects from DMT? N/A  · Taking vitamin D3 as recommended? No   · Patient has had two falls recently and one required two staples  · She states she is not sure why she hasn't started Aubagio yet    The patient location is: home  The chief complaint leading to consultation is: MS follow up    Visit type: audiovisual    Face to Face time with patient: 18 minutes of total time spent on the encounter, which includes face to face time and non-face to face time preparing to see the patient (eg, review of tests), Obtaining and/or reviewing separately obtained history, Documenting clinical information in the electronic or other health record, Independently interpreting results (not separately reported) and communicating results to the patient/family/caregiver, or Care coordination (not separately reported).         Each patient to whom he or she provides medical services by telemedicine is:  (1) informed of the relationship between the physician and patient and the respective role of any other health care provider with respect to management of the patient; and (2) notified that he or she may decline to receive medical services by telemedicine and may withdraw from such care at any time.          Medications:  Current Outpatient Medications   Medication Sig    amlodipine (NORVASC) 5 MG tablet Take 5 mg by mouth once daily.    buPROPion (WELLBUTRIN XL) 150 MG TB24 tablet Take 1 tablet (150 mg total) by mouth once daily.    escitalopram oxalate (LEXAPRO) 20 MG tablet TAKE 1 TABLET (20 MG TOTAL) BY MOUTH EVERY EVENING.    lorazepam (ATIVAN) 2 MG Tab Take 1 tablet (2 mg total) by mouth 3 (three) times daily as needed. (Patient taking differently: Take 2 mg by mouth  "every evening. )    OXcarbazepine (TRILEPTAL) 300 MG Tab TAKE 1 TABLET (300 MG TOTAL) BY MOUTH 2 (TWO) TIMES DAILY. (Patient taking differently: Take 300 mg by mouth daily as needed. )    rosuvastatin (CRESTOR) 10 MG tablet Take 10 mg by mouth every evening.      No current facility-administered medications for this visit.        SOCIAL HISTORY  Social History     Tobacco Use    Smoking status: Never Smoker    Smokeless tobacco: Never Used   Substance Use Topics    Alcohol use: No    Drug use: No       Living arrangements - the patient lives with their family.    ROS:    REVIEW OF SYMPTOMS 5/27/2020   Do you feel abnormally tired on most days? Yes--night owl   Do you feel you generally sleep well? Yes   Do you have difficulty controlling your bladder?  Yes, urgency   Do you have difficulty controlling your bowels?  No    Do you have frequent muscle cramps, tightness or spasms in your limbs?  No   Do you have new visual symptoms?  No   Do you have worsening difficulty with your memory or thinking? Yes-memory issues   Do you have worsening symptoms of anxiety or depression?  Yes   Walking issues    Have you fallen since your last visit?  Yes-one fall was related to bending over to get object off of floor, other fall occurred in the bathroom and unsure what caused fall-"I just fell"   For patients who use wheelchairs: Do you have any skin wounds or breakdown? No   Do you have difficulty using your hands?  No   Do you have shooting or burning pain? No   Do you have difficulty with sexual function?  Yes   If you are sexually active, are you using birth control? Y/N  N/A No   Do you often choke when swallowing liquids or solid food?  No   Do you experience worsening symptoms when overheated? Yes   Do you need any new equipment such as a wheelchair, walker or shower chair? No   Do you receive co-pay financial assistance for your principal MS medicine? No   Would you be interested in participating in an MS research " trial in the future? Yes   For patients on Gilenya, Tecfidera, Aubagio, Rituxan, Ocrevus, Tysabri, Lemtrada or Methotrexate, are you aware that you should NOT receive live virus vaccines?  Yes   Do you feel you have adequate family/friend support?  Yes   Do you have health insurance?   Yes   Are you currently employed? No   Do you receive SSDI/SSI?  Yes   Do you use marijuana or cannabis products? No   Have you been diagnosed with a urinary tract infection since your last visit here? No   Have you been diagnosed with a respiratory tract infection since your last visit here? No   Have you been to the emergency room since your last visit here? Yes   Have you been hospitalized since your last visit here?  Yes                Objective:        Neurologic Exam  Deferred this date.    Imaging:       Results for orders placed during the hospital encounter of 03/06/20   MRI Brain Demyelinating W W/O Contrast    Impression Overall stable findings compatible with non active demyelinating disease affecting the brain and spinal cord.      Electronically signed by: Nate Rivera Jr  Date:    03/07/2020  Time:    15:28     Results for orders placed during the hospital encounter of 03/06/20   MRI Cervical Spine Demyelinating W W/O Contrast    Impression Overall stable findings compatible with non active demyelinating disease affecting the brain and spinal cord.      Electronically signed by: Nate Rivera Jr  Date:    03/07/2020  Time:    15:28     Results for orders placed during the hospital encounter of 03/06/20   MRI Thoracic Spine Demyelinating W W/O Contrast    Impression Overall stable findings compatible with non active demyelinating disease affecting the brain and spinal cord.      Electronically signed by: Nate Rivera Jr  Date:    03/07/2020  Time:    15:28         Labs:     Lab Results   Component Value Date    GBJZYLWJ55YB 20 (L) 03/06/2020    XPACSZDC22EU 7 (L) 07/18/2018    BVORLNSK57NI 15 (L) 11/28/2016      Lab Results   Component Value Date    JCVINDEX 0.17 07/18/2018    JCVANTIBODY Negative 07/18/2018     Lab Results   Component Value Date    KN0SDUSK 81.7 11/28/2016    ABSOLUTECD3 2375 (H) 11/28/2016    OQ4IAGHP 23.7 11/28/2016    ABSOLUTECD8 689 11/28/2016    DB1UUQUK 58.7 (H) 11/28/2016    ABSOLUTECD4 1707 (H) 11/28/2016    LABCD48 2.48 11/28/2016     Lab Results   Component Value Date    WBC 8.92 03/09/2020    RBC 4.74 03/09/2020    HGB 13.1 03/09/2020    HCT 41.8 03/09/2020    MCV 88 03/09/2020    MCH 27.6 03/09/2020    MCHC 31.3 (L) 03/09/2020    RDW 13.1 03/09/2020     03/09/2020    MPV 12.1 03/09/2020    GRAN 5.1 03/09/2020    GRAN 57.2 03/09/2020    LYMPH 2.7 03/09/2020    LYMPH 29.9 03/09/2020    MONO 0.7 03/09/2020    MONO 8.2 03/09/2020    EOS 0.3 03/09/2020    BASO 0.07 03/09/2020    EOSINOPHIL 3.6 03/09/2020    BASOPHIL 0.8 03/09/2020     Sodium   Date Value Ref Range Status   03/09/2020 143 136 - 145 mmol/L Final     Potassium   Date Value Ref Range Status   03/09/2020 3.6 3.5 - 5.1 mmol/L Final     Chloride   Date Value Ref Range Status   03/09/2020 108 95 - 110 mmol/L Final     CO2   Date Value Ref Range Status   03/09/2020 24 23 - 29 mmol/L Final     Glucose   Date Value Ref Range Status   03/09/2020 86 70 - 110 mg/dL Final     BUN, Bld   Date Value Ref Range Status   03/09/2020 10 6 - 20 mg/dL Final     Creatinine   Date Value Ref Range Status   03/09/2020 0.8 0.5 - 1.4 mg/dL Final     Calcium   Date Value Ref Range Status   03/09/2020 9.0 8.7 - 10.5 mg/dL Final     Total Protein   Date Value Ref Range Status   03/09/2020 6.9 6.0 - 8.4 g/dL Final     Albumin   Date Value Ref Range Status   03/09/2020 3.6 3.5 - 5.2 g/dL Final     Total Bilirubin   Date Value Ref Range Status   03/09/2020 0.4 0.1 - 1.0 mg/dL Final     Comment:     For infants and newborns, interpretation of results should be based  on gestational age, weight and in agreement with clinical  observations.  Premature Infant  recommended reference ranges:  Up to 24 hours.............<8.0 mg/dL  Up to 48 hours............<12.0 mg/dL  3-5 days..................<15.0 mg/dL  6-29 days.................<15.0 mg/dL       Alkaline Phosphatase   Date Value Ref Range Status   03/09/2020 96 55 - 135 U/L Final     AST   Date Value Ref Range Status   03/09/2020 25 10 - 40 U/L Final     ALT   Date Value Ref Range Status   03/09/2020 17 10 - 44 U/L Final     Anion Gap   Date Value Ref Range Status   03/09/2020 11 8 - 16 mmol/L Final     eGFR if    Date Value Ref Range Status   03/09/2020 >60.0 >60 mL/min/1.73 m^2 Final     eGFR if non    Date Value Ref Range Status   03/09/2020 >60.0 >60 mL/min/1.73 m^2 Final     Comment:     Calculation used to obtain the estimated glomerular filtration  rate (eGFR) is the CKD-EPI equation.        Lab Results   Component Value Date    HEPBSAG Negative 03/08/2020    HEPBSAB Negative 03/08/2020    HEPBCAB Negative 03/08/2020           MS Impression and Plan:     NEURO MULTIPLE SCLEROSIS IMPRESSION:   MS Status:     Number of relapses in the past year?:  0    MRI Progression:  Stable    MRI Progression comment:  MRI's of Brain, C and T spine stable in March 2020  Plan:     DMT comment:  Has not started DMT due to not completing appropriate labs. Will reschedule and have these done next week per patient request.     Symptom Management:  Implement change in symptom management    Implement Change in Symptom Management:  Gait and Bladder (referral placed for outpatient PT; initiated Ditropan XL 5mg-discussed possible side effects (dry mouth/eyes))     Next Imaging Due: 11/27/2020     Next Labs Due: 5/27/2020    Our video visit today lasted 18 minutes, and 100% of this time was spent face to face with the patient. Over 50% of this visit included discussion of the treatment plan/medication changes/symptom management/exam findings/imaging/ coordination of care.     Problem List Items Addressed  This Visit        Neuro    Trigeminal neuralgia     Needs safety labs-ordered today. TN well controlled at present              Renal/    Neurogenic bladder     Initiated Ditropan XL 5mg today. Discussed primarily possible side effect of dry mouth/eyes(provided with written material for review)         Relevant Medications    oxybutynin (DITROPAN-XL) 5 MG TR24       Other    Neurologic gait dysfunction    Relevant Orders    Ambulatory referral/consult to Physical/Occupational Therapy    At high risk for falls    Relevant Orders    Ambulatory referral/consult to Physical/Occupational Therapy      Other Visit Diagnoses     Multiple sclerosis    -  Primary    Relevant Medications    oxybutynin (DITROPAN-XL) 5 MG TR24    Other Relevant Orders    Ambulatory referral/consult to Physical/Occupational Therapy    Paraparesis        Relevant Orders    Ambulatory referral/consult to Physical/Occupational Therapy    Counseling regarding goals of care          Follow up in about 2 months (around 7/27/2020) for follow up with Dr. Zimmer.  Patient agreed to POC today.    Attending, Dr. Zimmer, was available during today's encounter.     Carlota Duran PA-C  [facility]

## 2020-05-27 NOTE — ASSESSMENT & PLAN NOTE
Initiated Ditropan XL 5mg today. Discussed primarily possible side effect of dry mouth/eyes(provided with written material for review)

## 2020-08-31 ENCOUNTER — TELEPHONE (OUTPATIENT)
Dept: NEUROLOGY | Facility: CLINIC | Age: 60
End: 2020-08-31

## 2020-08-31 NOTE — TELEPHONE ENCOUNTER
Fina Bates, RN   Caller: Bashir Cope - Cousin (3 days ago,  9:43 AM)             Good morning,     My apologies, I have been having some issues w/ [employer]. Here's the pt's information: Deb Figueroa (Legal)     59 y.o., 1960   Legal sex: Female   Marital Status:    Employer: disabled   SSN:    MRN: [id number]     [address] [facility] [id number] [phone] (Mobile)     [phone] (Home Phone)   [phone] (Work Phone)      Called and left  for Bashir Brody. Called pt's number and reached her . He stated he is not sure if he can bring her tomorrow for 4pm. He is going to make some phone calls and call me back in an hour.

## 2020-09-08 ENCOUNTER — TELEPHONE (OUTPATIENT)
Dept: NEUROLOGY | Facility: CLINIC | Age: 60
End: 2020-09-08

## 2020-09-08 NOTE — TELEPHONE ENCOUNTER
Attempted to reach Bashir. NO answer. Due to HIPPA I did not leave a message.      Spoke with patient and informed her that unfortunately Sarai is only in the office on a Tuesday/Wednesday. Per Sarai she will have to keep her appt for tomorrow afternoon.    Advised patient to sign an Involvement of Care form if her niece is going to be involved in her care.

## 2020-09-08 NOTE — TELEPHONE ENCOUNTER
----- Message from Ravi Chen sent at 9/8/2020 10:37 AM CDT -----  Contact: Alvarezmartín (charis ) @ [phone]  Patient returning a missed call, pls return call

## 2020-09-08 NOTE — TELEPHONE ENCOUNTER
Pt.s niece called. She is wanting to bring pt in today. She stated they have an appt for tomorrow but she is getting worse She is having problems sitting in wheelchair. She is sliding right out. Instructed will send a message to Dr. Moon's staff.

## 2020-09-09 ENCOUNTER — TELEPHONE (OUTPATIENT)
Dept: NEUROLOGY | Facility: CLINIC | Age: 60
End: 2020-09-09

## 2020-09-09 ENCOUNTER — LAB VISIT (OUTPATIENT)
Dept: LAB | Facility: HOSPITAL | Age: 60
End: 2020-09-09
Attending: PHYSICIAN ASSISTANT
Payer: COMMERCIAL

## 2020-09-09 DIAGNOSIS — G35 MULTIPLE SCLEROSIS: ICD-10-CM

## 2020-09-09 DIAGNOSIS — R39.9 UTI SYMPTOMS: ICD-10-CM

## 2020-09-09 LAB
ALBUMIN SERPL BCP-MCNC: 3.4 G/DL (ref 3.5–5.2)
ALP SERPL-CCNC: 90 U/L (ref 55–135)
ALT SERPL W/O P-5'-P-CCNC: 10 U/L (ref 10–44)
ANION GAP SERPL CALC-SCNC: 10 MMOL/L (ref 8–16)
AST SERPL-CCNC: 12 U/L (ref 10–40)
BASOPHILS # BLD AUTO: 0.05 K/UL (ref 0–0.2)
BASOPHILS NFR BLD: 0.4 % (ref 0–1.9)
BILIRUB SERPL-MCNC: 0.4 MG/DL (ref 0.1–1)
BUN SERPL-MCNC: 12 MG/DL (ref 6–20)
CALCIUM SERPL-MCNC: 9.4 MG/DL (ref 8.7–10.5)
CHLORIDE SERPL-SCNC: 101 MMOL/L (ref 95–110)
CO2 SERPL-SCNC: 27 MMOL/L (ref 23–29)
CREAT SERPL-MCNC: 0.9 MG/DL (ref 0.5–1.4)
DIFFERENTIAL METHOD: ABNORMAL
EOSINOPHIL # BLD AUTO: 0.2 K/UL (ref 0–0.5)
EOSINOPHIL NFR BLD: 1.3 % (ref 0–8)
ERYTHROCYTE [DISTWIDTH] IN BLOOD BY AUTOMATED COUNT: 12.9 % (ref 11.5–14.5)
EST. GFR  (AFRICAN AMERICAN): >60 ML/MIN/1.73 M^2
EST. GFR  (NON AFRICAN AMERICAN): >60 ML/MIN/1.73 M^2
GLUCOSE SERPL-MCNC: 128 MG/DL (ref 70–110)
HCT VFR BLD AUTO: 38.6 % (ref 37–48.5)
HGB BLD-MCNC: 12.6 G/DL (ref 12–16)
IMM GRANULOCYTES # BLD AUTO: 0.04 K/UL (ref 0–0.04)
IMM GRANULOCYTES NFR BLD AUTO: 0.3 % (ref 0–0.5)
LYMPHOCYTES # BLD AUTO: 2 K/UL (ref 1–4.8)
LYMPHOCYTES NFR BLD: 17.4 % (ref 18–48)
MCH RBC QN AUTO: 27.4 PG (ref 27–31)
MCHC RBC AUTO-ENTMCNC: 32.6 G/DL (ref 32–36)
MCV RBC AUTO: 84 FL (ref 82–98)
MONOCYTES # BLD AUTO: 0.7 K/UL (ref 0.3–1)
MONOCYTES NFR BLD: 5.8 % (ref 4–15)
NEUTROPHILS # BLD AUTO: 8.8 K/UL (ref 1.8–7.7)
NEUTROPHILS NFR BLD: 74.8 % (ref 38–73)
NRBC BLD-RTO: 0 /100 WBC
PLATELET # BLD AUTO: 308 K/UL (ref 150–350)
PMV BLD AUTO: 11 FL (ref 9.2–12.9)
POTASSIUM SERPL-SCNC: 3 MMOL/L (ref 3.5–5.1)
PROT SERPL-MCNC: 7.4 G/DL (ref 6–8.4)
RBC # BLD AUTO: 4.6 M/UL (ref 4–5.4)
SODIUM SERPL-SCNC: 138 MMOL/L (ref 136–145)
WBC # BLD AUTO: 11.75 K/UL (ref 3.9–12.7)

## 2020-09-09 PROCEDURE — 36415 COLL VENOUS BLD VENIPUNCTURE: CPT

## 2020-09-09 PROCEDURE — 80053 COMPREHEN METABOLIC PANEL: CPT

## 2020-09-09 PROCEDURE — 85025 COMPLETE CBC W/AUTO DIFF WBC: CPT

## 2020-09-09 NOTE — TELEPHONE ENCOUNTER
Spoke with patients spouse and informed him that patient needs to see  and the MS team at [facility]. Advised him to call and leave a message with 's office.

## 2020-09-10 ENCOUNTER — HOSPITAL ENCOUNTER (EMERGENCY)
Facility: HOSPITAL | Age: 60
Discharge: HOME | End: 2020-09-10
Attending: SURGERY
Payer: COMMERCIAL

## 2020-09-10 VITALS
BODY MASS INDEX: 20.8 KG/M2 | RESPIRATION RATE: 18 BRPM | TEMPERATURE: 97 F | WEIGHT: 125 LBS | SYSTOLIC BLOOD PRESSURE: 122 MMHG | DIASTOLIC BLOOD PRESSURE: 72 MMHG | HEART RATE: 79 BPM | OXYGEN SATURATION: 98 %

## 2020-09-10 DIAGNOSIS — E87.6 HYPOKALEMIA: ICD-10-CM

## 2020-09-10 DIAGNOSIS — N30.00 ACUTE CYSTITIS WITHOUT HEMATURIA: Primary | ICD-10-CM

## 2020-09-10 DIAGNOSIS — R53.83 FATIGUE: ICD-10-CM

## 2020-09-10 LAB
ALBUMIN SERPL BCP-MCNC: 3.5 G/DL (ref 3.5–5.2)
ALP SERPL-CCNC: 82 U/L (ref 55–135)
ALT SERPL W/O P-5'-P-CCNC: 9 U/L (ref 10–44)
ANION GAP SERPL CALC-SCNC: 12 MMOL/L (ref 8–16)
APTT BLDCRRT: 27.9 SEC (ref 21–32)
AST SERPL-CCNC: 16 U/L (ref 10–40)
BACTERIA #/AREA URNS HPF: ABNORMAL /HPF
BASOPHILS # BLD AUTO: 0.03 K/UL (ref 0–0.2)
BASOPHILS NFR BLD: 0.3 % (ref 0–1.9)
BILIRUB SERPL-MCNC: 0.3 MG/DL (ref 0.1–1)
BILIRUB UR QL STRIP: NEGATIVE
BNP SERPL-MCNC: 18 PG/ML (ref 0–99)
BUN SERPL-MCNC: 10 MG/DL (ref 6–20)
CALCIUM SERPL-MCNC: 10.1 MG/DL (ref 8.7–10.5)
CHLORIDE SERPL-SCNC: 103 MMOL/L (ref 95–110)
CK MB SERPL-MCNC: 0.9 NG/ML (ref 0.1–6.5)
CK MB SERPL-RTO: 1.8 % (ref 0–5)
CK SERPL-CCNC: 51 U/L (ref 20–180)
CK SERPL-CCNC: 51 U/L (ref 20–180)
CLARITY UR: ABNORMAL
CO2 SERPL-SCNC: 30 MMOL/L (ref 23–29)
COLOR UR: YELLOW
CREAT SERPL-MCNC: 1.1 MG/DL (ref 0.5–1.4)
D DIMER PPP IA.FEU-MCNC: 0.32 MG/L FEU
DIFFERENTIAL METHOD: NORMAL
EOSINOPHIL # BLD AUTO: 0.2 K/UL (ref 0–0.5)
EOSINOPHIL NFR BLD: 1.6 % (ref 0–8)
ERYTHROCYTE [DISTWIDTH] IN BLOOD BY AUTOMATED COUNT: 13 % (ref 11.5–14.5)
ERYTHROCYTE [SEDIMENTATION RATE] IN BLOOD BY WESTERGREN METHOD: 64 MM/HR (ref 0–20)
EST. GFR  (AFRICAN AMERICAN): >60 ML/MIN/1.73 M^2
EST. GFR  (NON AFRICAN AMERICAN): 55 ML/MIN/1.73 M^2
GLUCOSE SERPL-MCNC: 117 MG/DL (ref 70–110)
GLUCOSE UR QL STRIP: NEGATIVE
GROUP A STREP, MOLECULAR: NEGATIVE
HCT VFR BLD AUTO: 40.2 % (ref 37–48.5)
HGB BLD-MCNC: 13.1 G/DL (ref 12–16)
HGB UR QL STRIP: ABNORMAL
IMM GRANULOCYTES # BLD AUTO: 0.03 K/UL (ref 0–0.04)
IMM GRANULOCYTES NFR BLD AUTO: 0.3 % (ref 0–0.5)
INFLUENZA A, MOLECULAR: NEGATIVE
INFLUENZA B, MOLECULAR: NEGATIVE
INR PPP: 1 (ref 0.8–1.2)
KETONES UR QL STRIP: NEGATIVE
LACTATE SERPL-SCNC: 1.2 MMOL/L (ref 0.5–2.2)
LDH SERPL L TO P-CCNC: 134 U/L (ref 110–260)
LEUKOCYTE ESTERASE UR QL STRIP: ABNORMAL
LIPASE SERPL-CCNC: 61 U/L (ref 4–60)
LYMPHOCYTES # BLD AUTO: 2 K/UL (ref 1–4.8)
LYMPHOCYTES NFR BLD: 22.2 % (ref 18–48)
MAGNESIUM SERPL-MCNC: 2.2 MG/DL (ref 1.6–2.6)
MCH RBC QN AUTO: 27.2 PG (ref 27–31)
MCHC RBC AUTO-ENTMCNC: 32.6 G/DL (ref 32–36)
MCV RBC AUTO: 83 FL (ref 82–98)
MICROSCOPIC COMMENT: ABNORMAL
MONOCYTES # BLD AUTO: 0.6 K/UL (ref 0.3–1)
MONOCYTES NFR BLD: 6.2 % (ref 4–15)
NEUTROPHILS # BLD AUTO: 6.3 K/UL (ref 1.8–7.7)
NEUTROPHILS NFR BLD: 69.4 % (ref 38–73)
NITRITE UR QL STRIP: NEGATIVE
NRBC BLD-RTO: 0 /100 WBC
PH UR STRIP: 7 [PH] (ref 5–8)
PHOSPHATE SERPL-MCNC: 3.3 MG/DL (ref 2.7–4.5)
PLATELET # BLD AUTO: 339 K/UL (ref 150–350)
PMV BLD AUTO: 11.2 FL (ref 9.2–12.9)
POTASSIUM SERPL-SCNC: 3.1 MMOL/L (ref 3.5–5.1)
PROCALCITONIN SERPL IA-MCNC: 0.05 NG/ML
PROT SERPL-MCNC: 7.8 G/DL (ref 6–8.4)
PROT UR QL STRIP: ABNORMAL
PROTHROMBIN TIME: 10.5 SEC (ref 9–12.5)
RBC # BLD AUTO: 4.82 M/UL (ref 4–5.4)
RBC #/AREA URNS HPF: 4 /HPF (ref 0–4)
SARS-COV-2 RDRP RESP QL NAA+PROBE: NEGATIVE
SODIUM SERPL-SCNC: 145 MMOL/L (ref 136–145)
SP GR UR STRIP: 1.02 (ref 1–1.03)
SPECIMEN SOURCE: NORMAL
SQUAMOUS #/AREA URNS HPF: 40 /HPF
TROPONIN I SERPL DL<=0.01 NG/ML-MCNC: <0.006 NG/ML (ref 0–0.03)
TSH SERPL DL<=0.005 MIU/L-ACNC: 0.64 UIU/ML (ref 0.4–4)
URN SPEC COLLECT METH UR: ABNORMAL
UROBILINOGEN UR STRIP-ACNC: NEGATIVE EU/DL
WBC # BLD AUTO: 9.13 K/UL (ref 3.9–12.7)
WBC #/AREA URNS HPF: >100 /HPF (ref 0–5)
WBC CLUMPS URNS QL MICRO: ABNORMAL

## 2020-09-10 PROCEDURE — 80053 COMPREHEN METABOLIC PANEL: CPT

## 2020-09-10 PROCEDURE — 63600175 PHARM REV CODE 636 W HCPCS: Performed by: SURGERY

## 2020-09-10 PROCEDURE — 87502 INFLUENZA DNA AMP PROBE: CPT

## 2020-09-10 PROCEDURE — 81000 URINALYSIS NONAUTO W/SCOPE: CPT

## 2020-09-10 PROCEDURE — 84145 PROCALCITONIN (PCT): CPT

## 2020-09-10 PROCEDURE — 93010 ELECTROCARDIOGRAM REPORT: CPT | Mod: ,,, | Performed by: INTERNAL MEDICINE

## 2020-09-10 PROCEDURE — 82728 ASSAY OF FERRITIN: CPT

## 2020-09-10 PROCEDURE — 36415 COLL VENOUS BLD VENIPUNCTURE: CPT

## 2020-09-10 PROCEDURE — 87086 URINE CULTURE/COLONY COUNT: CPT

## 2020-09-10 PROCEDURE — 84100 ASSAY OF PHOSPHORUS: CPT

## 2020-09-10 PROCEDURE — 82553 CREATINE MB FRACTION: CPT

## 2020-09-10 PROCEDURE — 83615 LACTATE (LD) (LDH) ENZYME: CPT

## 2020-09-10 PROCEDURE — 93005 ELECTROCARDIOGRAM TRACING: CPT

## 2020-09-10 PROCEDURE — 87088 URINE BACTERIA CULTURE: CPT

## 2020-09-10 PROCEDURE — 87077 CULTURE AEROBIC IDENTIFY: CPT

## 2020-09-10 PROCEDURE — 84443 ASSAY THYROID STIM HORMONE: CPT

## 2020-09-10 PROCEDURE — 82550 ASSAY OF CK (CPK): CPT

## 2020-09-10 PROCEDURE — 85610 PROTHROMBIN TIME: CPT

## 2020-09-10 PROCEDURE — 85730 THROMBOPLASTIN TIME PARTIAL: CPT

## 2020-09-10 PROCEDURE — 87651 STREP A DNA AMP PROBE: CPT

## 2020-09-10 PROCEDURE — 85025 COMPLETE CBC W/AUTO DIFF WBC: CPT

## 2020-09-10 PROCEDURE — 84484 ASSAY OF TROPONIN QUANT: CPT

## 2020-09-10 PROCEDURE — 96366 THER/PROPH/DIAG IV INF ADDON: CPT

## 2020-09-10 PROCEDURE — 83880 ASSAY OF NATRIURETIC PEPTIDE: CPT

## 2020-09-10 PROCEDURE — 85379 FIBRIN DEGRADATION QUANT: CPT

## 2020-09-10 PROCEDURE — U0002 COVID-19 LAB TEST NON-CDC: HCPCS

## 2020-09-10 PROCEDURE — 83735 ASSAY OF MAGNESIUM: CPT

## 2020-09-10 PROCEDURE — 93010 EKG 12-LEAD: ICD-10-PCS | Mod: ,,, | Performed by: INTERNAL MEDICINE

## 2020-09-10 PROCEDURE — 83605 ASSAY OF LACTIC ACID: CPT

## 2020-09-10 PROCEDURE — 99285 EMERGENCY DEPT VISIT HI MDM: CPT | Mod: 25

## 2020-09-10 PROCEDURE — 83690 ASSAY OF LIPASE: CPT

## 2020-09-10 PROCEDURE — 85651 RBC SED RATE NONAUTOMATED: CPT

## 2020-09-10 PROCEDURE — 86140 C-REACTIVE PROTEIN: CPT

## 2020-09-10 PROCEDURE — 96365 THER/PROPH/DIAG IV INF INIT: CPT

## 2020-09-10 PROCEDURE — 87040 BLOOD CULTURE FOR BACTERIA: CPT

## 2020-09-10 PROCEDURE — 96367 TX/PROPH/DG ADDL SEQ IV INF: CPT

## 2020-09-10 PROCEDURE — 87186 SC STD MICRODIL/AGAR DIL: CPT

## 2020-09-10 RX ORDER — SODIUM CHLORIDE AND POTASSIUM CHLORIDE 150; 900 MG/100ML; MG/100ML
1000 INJECTION, SOLUTION INTRAVENOUS
Status: COMPLETED | OUTPATIENT
Start: 2020-09-10 | End: 2020-09-10

## 2020-09-10 RX ORDER — NITROFURANTOIN 25; 75 MG/1; MG/1
100 CAPSULE ORAL 2 TIMES DAILY
Qty: 14 CAPSULE | Refills: 0 | Status: SHIPPED | OUTPATIENT
Start: 2020-09-10 | End: 2020-09-17

## 2020-09-10 RX ORDER — POTASSIUM CHLORIDE 20 MEQ/1
20 TABLET, EXTENDED RELEASE ORAL 2 TIMES DAILY
Qty: 30 TABLET | Refills: 0 | Status: SHIPPED | OUTPATIENT
Start: 2020-09-10 | End: 2020-09-25

## 2020-09-10 RX ADMIN — CEFTRIAXONE 2 G: 2 INJECTION, SOLUTION INTRAVENOUS at 08:09

## 2020-09-10 RX ADMIN — SODIUM CHLORIDE AND POTASSIUM CHLORIDE 1000 ML: 9; 1.49 INJECTION, SOLUTION INTRAVENOUS at 05:09

## 2020-09-10 NOTE — ED PROVIDER NOTES
Encounter Date: 9/10/2020       History     Chief Complaint   Patient presents with    Abnormal Lab     pt reports K+ of 3.0 from blood work done yesterday.      Deb Figueroa is a 59 y.o. female with PMH of HTN and MS who was referred to the ED for abnormal labwork.   Patient reports that she has been progressively getting weaker (legs), multiple falls. She notified her neurologist and outpatient blood work was ordered yesterday.   She was noted to have low k+ of 3.0 and increased granulocytes concerning for infection.   Patient denies URI symptoms; denies urinary symptoms.  She does not endorse fever, chills or body aches.     The history is provided by the patient.     Review of patient's allergies indicates:   Allergen Reactions    Vancomycin analogues Itching     Past Medical History:   Diagnosis Date    Hypertension     MS (multiple sclerosis)     Nephrolithiasis      Past Surgical History:   Procedure Laterality Date    LITHOTRIPSY  2016    Large renal stone.     Family History   Problem Relation Age of Onset    Cancer Father         prostate    Hypertension Mother     Breast cancer Neg Hx     Ovarian cancer Neg Hx     Colon cancer Neg Hx      Social History     Tobacco Use    Smoking status: Never Smoker    Smokeless tobacco: Never Used   Substance Use Topics    Alcohol use: No    Drug use: No     Review of Systems   Constitutional: Negative for activity change, chills and fever.   HENT: Negative.  Negative for congestion, ear discharge, ear pain, postnasal drip, sinus pressure, sinus pain and sore throat.    Eyes: Negative.    Respiratory: Negative.  Negative for cough, chest tightness and shortness of breath.    Cardiovascular: Negative.  Negative for chest pain.   Gastrointestinal: Negative.  Negative for abdominal distention, abdominal pain and nausea.   Endocrine: Negative.    Genitourinary: Negative.  Negative for dysuria, frequency and urgency.   Musculoskeletal: Positive for gait  problem. Negative for back pain.   Skin: Negative.  Negative for rash.   Allergic/Immunologic: Negative.    Neurological: Positive for weakness. Negative for dizziness, light-headedness and numbness.   Hematological: Negative.  Does not bruise/bleed easily.   Psychiatric/Behavioral: Negative.        Physical Exam     Initial Vitals [09/10/20 1605]   BP Pulse Resp Temp SpO2   122/72 79 18 97.3 °F (36.3 °C) 98 %      MAP       --         Physical Exam    Nursing note and vitals reviewed.  Constitutional: She appears well-developed and well-nourished.   HENT:   Head: Normocephalic and atraumatic.   Right Ear: Tympanic membrane, external ear and ear canal normal. Tympanic membrane is not erythematous. No middle ear effusion.   Left Ear: Tympanic membrane, external ear and ear canal normal. Tympanic membrane is not erythematous.  No middle ear effusion.   Nose: Nose normal.   Mouth/Throat: Uvula is midline, oropharynx is clear and moist and mucous membranes are normal. Mucous membranes are not pale and not dry.   Eyes: Conjunctivae and EOM are normal. Pupils are equal, round, and reactive to light.   Neck: Normal range of motion. Neck supple.   Cardiovascular: Normal rate, regular rhythm, normal heart sounds and intact distal pulses.   Pulmonary/Chest: Effort normal and breath sounds normal. She has no decreased breath sounds. She has no wheezes. She has no rhonchi. She has no rales.   Abdominal: Soft. Bowel sounds are normal. There is no abdominal tenderness.   Musculoskeletal: Normal range of motion.   Neurological: She is alert and oriented to person, place, and time. She has normal strength. She displays normal reflexes. No cranial nerve deficit or sensory deficit.   Skin: Skin is warm and dry. Capillary refill takes less than 2 seconds. No rash noted.   Psychiatric: She has a normal mood and affect. Her behavior is normal. Judgment and thought content normal.         ED Course   Procedures  Labs Reviewed   LIPASE -  Abnormal; Notable for the following components:       Result Value    Lipase 61 (*)     All other components within normal limits   URINALYSIS, REFLEX TO URINE CULTURE - Abnormal; Notable for the following components:    Appearance, UA Cloudy (*)     Protein, UA Trace (*)     Occult Blood UA Trace (*)     Leukocytes, UA 2+ (*)     All other components within normal limits    Narrative:     Specimen Source->Urine   COMPREHENSIVE METABOLIC PANEL - Abnormal; Notable for the following components:    Potassium 3.1 (*)     CO2 30 (*)     Glucose 117 (*)     ALT 9 (*)     eGFR if non  55 (*)     All other components within normal limits   SEDIMENTATION RATE - Abnormal; Notable for the following components:    Sed Rate 64 (*)     All other components within normal limits   URINALYSIS MICROSCOPIC - Abnormal; Notable for the following components:    WBC, UA >100 (*)     WBC Clumps, UA Few (*)     Bacteria Many (*)     All other components within normal limits    Narrative:     Specimen Source->Urine   INFLUENZA A & B BY MOLECULAR   GROUP A STREP, MOLECULAR   CULTURE, BLOOD   CULTURE, BLOOD   CULTURE, URINE   TSH   MAGNESIUM   PHOSPHORUS   SARS-COV-2 RNA AMPLIFICATION, QUAL   CBC W/ AUTO DIFFERENTIAL   TROPONIN I   CK-MB   B-TYPE NATRIURETIC PEPTIDE   CK   PROTIME-INR   LACTIC ACID, PLASMA   APTT   D DIMER, QUANTITATIVE   PROCALCITONIN   LACTATE DEHYDROGENASE   C-REACTIVE PROTEIN   FERRITIN          Imaging Results          X-Ray Chest 1 View (Final result)  Result time 09/10/20 17:20:54    Final result by Jeffrey Ramirez MD (09/10/20 17:20:54)                 Impression:      No acute abnormality.      Electronically signed by: Jeffrey Ramirez  Date:    09/10/2020  Time:    17:20             Narrative:    EXAMINATION:  XR CHEST 1 VIEW    CLINICAL HISTORY:  CP;    TECHNIQUE:  Single frontal view of the chest was performed.    COMPARISON:  03/06/2020    FINDINGS:  The lungs are clear, with normal appearance of  pulmonary vasculature and no pleural effusion or pneumothorax.    The cardiac silhouette is normal in size. The hilar and mediastinal contours are unremarkable.    Bones are intact.                                                             Clinical Impression:       ICD-10-CM ICD-9-CM   1. Acute cystitis without hematuria  N30.00 595.0   2. Fatigue  R53.83 780.79   3. Hypokalemia  E87.6 276.8         Disposition:   Disposition: Discharged  Condition: Stable     ED Disposition Condition    Discharge Stable        ED Prescriptions     Medication Sig Dispense Start Date End Date Auth. Provider    nitrofurantoin, macrocrystal-monohydrate, (MACROBID) 100 MG capsule Take 1 capsule (100 mg total) by mouth 2 (two) times daily. for 7 days 14 capsule 9/10/2020 9/17/2020 Michi Diallo MD    potassium chloride SA (K-DUR,KLOR-CON) 20 MEQ tablet Take 1 tablet (20 mEq total) by mouth 2 (two) times daily. for 15 days 30 tablet 9/10/2020 9/25/2020 Michi Diallo MD        Follow-up Information     Follow up With Specialties Details Why Contact Info    Jesus Thrasher MD Family Medicine Schedule an appointment as soon as possible for a visit in 2 days  [address]  [facility]  [place] [address]  [phone]                                This Patient Was Turned Over To Me From The Nurse Practitioner     -- I took over this note from the nurse practitioner this shift  -- His/her documentation and exam is above this line, my documentation is below  -- patient with longstanding history of multiple sclerosis, had outpatient lab work  -- patient had a small home neutrophil shift, sent to the ER for evaluation today?  -- extensive workup showed hypokalemia, replace as an outpatient on discharge  -- patient also with urinary tract infection, IV Rocephin given in the emergency room  -- patient prescribed Macrobid with close follow-up by PCP advised today  -- asymptomatic on discharge, return to the ER with any concerning  symptoms            Michi Diallo MD  09/10/20 2021

## 2020-09-10 NOTE — ED TRIAGE NOTES
Pt reports low potassium from blood work done yesterday. Pt reports damien leg weakness at this time and loss of appetite over the last 6 months. Pt has hx of MS

## 2020-09-11 LAB
CRP SERPL-MCNC: 21.7 MG/L (ref 0–8.2)
FERRITIN SERPL-MCNC: 94 NG/ML (ref 20–300)

## 2020-09-14 LAB — BACTERIA UR CULT: ABNORMAL

## 2020-09-15 LAB
BACTERIA BLD CULT: NORMAL
BACTERIA BLD CULT: NORMAL

## 2020-11-02 ENCOUNTER — PATIENT MESSAGE (OUTPATIENT)
Dept: PSYCHIATRY | Facility: CLINIC | Age: 60
End: 2020-11-02

## 2021-02-05 ENCOUNTER — PATIENT MESSAGE (OUTPATIENT)
Dept: NEUROLOGY | Facility: CLINIC | Age: 61
End: 2021-02-05

## 2021-04-05 ENCOUNTER — SPECIALTY PHARMACY (OUTPATIENT)
Dept: PHARMACY | Facility: CLINIC | Age: 61
End: 2021-04-05

## 2021-04-05 ENCOUNTER — OFFICE VISIT (OUTPATIENT)
Dept: NEUROLOGY | Facility: CLINIC | Age: 61
End: 2021-04-05
Payer: COMMERCIAL

## 2021-04-05 ENCOUNTER — LAB VISIT (OUTPATIENT)
Dept: LAB | Facility: HOSPITAL | Age: 61
End: 2021-04-05
Attending: PSYCHIATRY & NEUROLOGY
Payer: COMMERCIAL

## 2021-04-05 VITALS
SYSTOLIC BLOOD PRESSURE: 127 MMHG | HEIGHT: 65 IN | HEART RATE: 88 BPM | BODY MASS INDEX: 24.41 KG/M2 | DIASTOLIC BLOOD PRESSURE: 87 MMHG | WEIGHT: 146.5 LBS

## 2021-04-05 DIAGNOSIS — N31.9 NEUROGENIC BLADDER: Primary | ICD-10-CM

## 2021-04-05 DIAGNOSIS — G35 MS (MULTIPLE SCLEROSIS): ICD-10-CM

## 2021-04-05 DIAGNOSIS — D84.9 IMMUNOSUPPRESSION: ICD-10-CM

## 2021-04-05 DIAGNOSIS — R26.9 GAIT DISTURBANCE: ICD-10-CM

## 2021-04-05 DIAGNOSIS — Z71.89 COUNSELING REGARDING GOALS OF CARE: ICD-10-CM

## 2021-04-05 DIAGNOSIS — F34.1 DYSTHYMIA: ICD-10-CM

## 2021-04-05 PROCEDURE — 3008F PR BODY MASS INDEX (BMI) DOCUMENTED: ICD-10-PCS | Mod: CPTII,S$GLB,, | Performed by: PSYCHIATRY & NEUROLOGY

## 2021-04-05 PROCEDURE — 99999 PR PBB SHADOW E&M-EST. PATIENT-LVL III: ICD-10-PCS | Mod: PBBFAC,,, | Performed by: PSYCHIATRY & NEUROLOGY

## 2021-04-05 PROCEDURE — 1126F AMNT PAIN NOTED NONE PRSNT: CPT | Mod: S$GLB,,, | Performed by: PSYCHIATRY & NEUROLOGY

## 2021-04-05 PROCEDURE — 99215 OFFICE O/P EST HI 40 MIN: CPT | Mod: S$GLB,,, | Performed by: PSYCHIATRY & NEUROLOGY

## 2021-04-05 PROCEDURE — 3074F SYST BP LT 130 MM HG: CPT | Mod: CPTII,S$GLB,, | Performed by: PSYCHIATRY & NEUROLOGY

## 2021-04-05 PROCEDURE — 86480 TB TEST CELL IMMUN MEASURE: CPT | Performed by: PSYCHIATRY & NEUROLOGY

## 2021-04-05 PROCEDURE — 3079F PR MOST RECENT DIASTOLIC BLOOD PRESSURE 80-89 MM HG: ICD-10-PCS | Mod: CPTII,S$GLB,, | Performed by: PSYCHIATRY & NEUROLOGY

## 2021-04-05 PROCEDURE — 3008F BODY MASS INDEX DOCD: CPT | Mod: CPTII,S$GLB,, | Performed by: PSYCHIATRY & NEUROLOGY

## 2021-04-05 PROCEDURE — 99999 PR PBB SHADOW E&M-EST. PATIENT-LVL III: CPT | Mod: PBBFAC,,, | Performed by: PSYCHIATRY & NEUROLOGY

## 2021-04-05 PROCEDURE — 99215 PR OFFICE/OUTPT VISIT, EST, LEVL V, 40-54 MIN: ICD-10-PCS | Mod: S$GLB,,, | Performed by: PSYCHIATRY & NEUROLOGY

## 2021-04-05 PROCEDURE — 36415 COLL VENOUS BLD VENIPUNCTURE: CPT | Performed by: PSYCHIATRY & NEUROLOGY

## 2021-04-05 PROCEDURE — 3074F PR MOST RECENT SYSTOLIC BLOOD PRESSURE < 130 MM HG: ICD-10-PCS | Mod: CPTII,S$GLB,, | Performed by: PSYCHIATRY & NEUROLOGY

## 2021-04-05 PROCEDURE — 3079F DIAST BP 80-89 MM HG: CPT | Mod: CPTII,S$GLB,, | Performed by: PSYCHIATRY & NEUROLOGY

## 2021-04-05 PROCEDURE — 1126F PR PAIN SEVERITY QUANTIFIED, NO PAIN PRESENT: ICD-10-PCS | Mod: S$GLB,,, | Performed by: PSYCHIATRY & NEUROLOGY

## 2021-04-05 RX ORDER — SOLIFENACIN SUCCINATE 10 MG/1
10 TABLET, FILM COATED ORAL DAILY
Qty: 30 TABLET | Refills: 5 | Status: SHIPPED | OUTPATIENT
Start: 2021-04-05 | End: 2022-08-08

## 2021-04-05 RX ORDER — BUPROPION HYDROCHLORIDE 150 MG/1
150 TABLET ORAL DAILY
Qty: 30 TABLET | Refills: 11 | Status: SHIPPED | OUTPATIENT
Start: 2021-04-05 | End: 2022-04-25

## 2021-04-05 RX ORDER — TERIFLUNOMIDE 14 MG/1
1 TABLET, FILM COATED ORAL DAILY
Qty: 30 TABLET | Refills: 2 | OUTPATIENT
Start: 2021-04-05 | End: 2021-04-19 | Stop reason: SDUPTHER

## 2021-04-06 LAB
GAMMA INTERFERON BACKGROUND BLD IA-ACNC: 0.07 IU/ML
M TB IFN-G CD4+ BCKGRND COR BLD-ACNC: 0.01 IU/ML
MITOGEN IGNF BCKGRD COR BLD-ACNC: >10 IU/ML
TB GOLD PLUS: NEGATIVE
TB2 - NIL: 0.01 IU/ML

## 2021-04-19 ENCOUNTER — PATIENT MESSAGE (OUTPATIENT)
Dept: NEUROLOGY | Facility: CLINIC | Age: 61
End: 2021-04-19

## 2021-04-19 DIAGNOSIS — G35 MS (MULTIPLE SCLEROSIS): ICD-10-CM

## 2021-04-19 RX ORDER — TERIFLUNOMIDE 14 MG/1
1 TABLET, FILM COATED ORAL DAILY
Qty: 30 TABLET | Refills: 2 | Status: SHIPPED | OUTPATIENT
Start: 2021-04-19 | End: 2021-07-19

## 2021-04-23 ENCOUNTER — DOCUMENTATION ONLY (OUTPATIENT)
Dept: NEUROLOGY | Facility: CLINIC | Age: 61
End: 2021-04-23

## 2021-04-28 ENCOUNTER — PATIENT MESSAGE (OUTPATIENT)
Dept: NEUROLOGY | Facility: CLINIC | Age: 61
End: 2021-04-28

## 2021-04-30 ENCOUNTER — PATIENT MESSAGE (OUTPATIENT)
Dept: NEUROLOGY | Facility: CLINIC | Age: 61
End: 2021-04-30

## 2021-05-17 ENCOUNTER — LAB VISIT (OUTPATIENT)
Dept: LAB | Facility: HOSPITAL | Age: 61
End: 2021-05-17
Attending: PSYCHIATRY & NEUROLOGY
Payer: COMMERCIAL

## 2021-05-17 DIAGNOSIS — G35 MS (MULTIPLE SCLEROSIS): ICD-10-CM

## 2021-05-17 LAB
ALBUMIN SERPL BCP-MCNC: 3.8 G/DL (ref 3.5–5.2)
ALP SERPL-CCNC: 104 U/L (ref 55–135)
ALT SERPL W/O P-5'-P-CCNC: 13 U/L (ref 10–44)
AST SERPL-CCNC: 18 U/L (ref 10–40)
BASOPHILS # BLD AUTO: 0.08 K/UL (ref 0–0.2)
BASOPHILS NFR BLD: 1 % (ref 0–1.9)
BILIRUB DIRECT SERPL-MCNC: 0.2 MG/DL (ref 0.1–0.3)
BILIRUB SERPL-MCNC: 0.4 MG/DL (ref 0.1–1)
DIFFERENTIAL METHOD: ABNORMAL
EOSINOPHIL # BLD AUTO: 0.2 K/UL (ref 0–0.5)
EOSINOPHIL NFR BLD: 2.8 % (ref 0–8)
ERYTHROCYTE [DISTWIDTH] IN BLOOD BY AUTOMATED COUNT: 13.5 % (ref 11.5–14.5)
HCT VFR BLD AUTO: 43.7 % (ref 37–48.5)
HGB BLD-MCNC: 13.8 G/DL (ref 12–16)
IMM GRANULOCYTES # BLD AUTO: 0.1 K/UL (ref 0–0.04)
IMM GRANULOCYTES NFR BLD AUTO: 1.2 % (ref 0–0.5)
LYMPHOCYTES # BLD AUTO: 2.1 K/UL (ref 1–4.8)
LYMPHOCYTES NFR BLD: 25.8 % (ref 18–48)
MCH RBC QN AUTO: 26.4 PG (ref 27–31)
MCHC RBC AUTO-ENTMCNC: 31.6 G/DL (ref 32–36)
MCV RBC AUTO: 84 FL (ref 82–98)
MONOCYTES # BLD AUTO: 0.5 K/UL (ref 0.3–1)
MONOCYTES NFR BLD: 5.8 % (ref 4–15)
NEUTROPHILS # BLD AUTO: 5.1 K/UL (ref 1.8–7.7)
NEUTROPHILS NFR BLD: 63.4 % (ref 38–73)
NRBC BLD-RTO: 0 /100 WBC
PLATELET # BLD AUTO: 309 K/UL (ref 150–450)
PMV BLD AUTO: 10.6 FL (ref 9.2–12.9)
PROT SERPL-MCNC: 8.2 G/DL (ref 6–8.4)
RBC # BLD AUTO: 5.23 M/UL (ref 4–5.4)
WBC # BLD AUTO: 8.09 K/UL (ref 3.9–12.7)

## 2021-05-17 PROCEDURE — 80076 HEPATIC FUNCTION PANEL: CPT | Performed by: PSYCHIATRY & NEUROLOGY

## 2021-05-17 PROCEDURE — 36415 COLL VENOUS BLD VENIPUNCTURE: CPT | Performed by: PSYCHIATRY & NEUROLOGY

## 2021-05-17 PROCEDURE — 85025 COMPLETE CBC W/AUTO DIFF WBC: CPT | Performed by: PSYCHIATRY & NEUROLOGY

## 2021-05-26 ENCOUNTER — DOCUMENTATION ONLY (OUTPATIENT)
Dept: NEUROLOGY | Facility: CLINIC | Age: 61
End: 2021-05-26

## 2021-05-31 ENCOUNTER — PATIENT MESSAGE (OUTPATIENT)
Dept: PSYCHIATRY | Facility: CLINIC | Age: 61
End: 2021-05-31

## 2021-06-14 ENCOUNTER — LAB VISIT (OUTPATIENT)
Dept: LAB | Facility: HOSPITAL | Age: 61
End: 2021-06-14
Attending: PSYCHIATRY & NEUROLOGY
Payer: COMMERCIAL

## 2021-06-14 DIAGNOSIS — G35 MS (MULTIPLE SCLEROSIS): ICD-10-CM

## 2021-06-14 LAB
ALBUMIN SERPL BCP-MCNC: 3.6 G/DL (ref 3.5–5.2)
ALP SERPL-CCNC: 107 U/L (ref 55–135)
ALT SERPL W/O P-5'-P-CCNC: 9 U/L (ref 10–44)
AST SERPL-CCNC: 21 U/L (ref 10–40)
BASOPHILS # BLD AUTO: 0.06 K/UL (ref 0–0.2)
BASOPHILS NFR BLD: 0.6 % (ref 0–1.9)
BILIRUB DIRECT SERPL-MCNC: 0.1 MG/DL (ref 0.1–0.3)
BILIRUB SERPL-MCNC: 0.3 MG/DL (ref 0.1–1)
DIFFERENTIAL METHOD: ABNORMAL
EOSINOPHIL # BLD AUTO: 0.2 K/UL (ref 0–0.5)
EOSINOPHIL NFR BLD: 2.1 % (ref 0–8)
ERYTHROCYTE [DISTWIDTH] IN BLOOD BY AUTOMATED COUNT: 13.1 % (ref 11.5–14.5)
HCT VFR BLD AUTO: 41 % (ref 37–48.5)
HGB BLD-MCNC: 13.2 G/DL (ref 12–16)
IMM GRANULOCYTES # BLD AUTO: 0.03 K/UL (ref 0–0.04)
IMM GRANULOCYTES NFR BLD AUTO: 0.3 % (ref 0–0.5)
LYMPHOCYTES # BLD AUTO: 1.7 K/UL (ref 1–4.8)
LYMPHOCYTES NFR BLD: 16.5 % (ref 18–48)
MCH RBC QN AUTO: 26.5 PG (ref 27–31)
MCHC RBC AUTO-ENTMCNC: 32.2 G/DL (ref 32–36)
MCV RBC AUTO: 82 FL (ref 82–98)
MONOCYTES # BLD AUTO: 0.4 K/UL (ref 0.3–1)
MONOCYTES NFR BLD: 4.3 % (ref 4–15)
NEUTROPHILS # BLD AUTO: 7.7 K/UL (ref 1.8–7.7)
NEUTROPHILS NFR BLD: 76.2 % (ref 38–73)
NRBC BLD-RTO: 0 /100 WBC
PLATELET # BLD AUTO: 327 K/UL (ref 150–450)
PMV BLD AUTO: 10.7 FL (ref 9.2–12.9)
PROT SERPL-MCNC: 8.1 G/DL (ref 6–8.4)
RBC # BLD AUTO: 4.99 M/UL (ref 4–5.4)
WBC # BLD AUTO: 10.05 K/UL (ref 3.9–12.7)

## 2021-06-14 PROCEDURE — 80076 HEPATIC FUNCTION PANEL: CPT | Performed by: PSYCHIATRY & NEUROLOGY

## 2021-06-14 PROCEDURE — 85025 COMPLETE CBC W/AUTO DIFF WBC: CPT | Performed by: PSYCHIATRY & NEUROLOGY

## 2021-06-14 PROCEDURE — 36415 COLL VENOUS BLD VENIPUNCTURE: CPT | Performed by: PSYCHIATRY & NEUROLOGY

## 2021-07-19 ENCOUNTER — SPECIALTY PHARMACY (OUTPATIENT)
Dept: PHARMACY | Facility: CLINIC | Age: 61
End: 2021-07-19

## 2021-07-26 ENCOUNTER — LAB VISIT (OUTPATIENT)
Dept: LAB | Facility: HOSPITAL | Age: 61
End: 2021-07-26
Attending: PSYCHIATRY & NEUROLOGY
Payer: COMMERCIAL

## 2021-07-26 DIAGNOSIS — G35 MS (MULTIPLE SCLEROSIS): ICD-10-CM

## 2021-07-26 LAB
ALBUMIN SERPL BCP-MCNC: 3.7 G/DL (ref 3.5–5.2)
ALP SERPL-CCNC: 105 U/L (ref 55–135)
ALT SERPL W/O P-5'-P-CCNC: 13 U/L (ref 10–44)
AST SERPL-CCNC: 21 U/L (ref 10–40)
BASOPHILS # BLD AUTO: 0.08 K/UL (ref 0–0.2)
BASOPHILS NFR BLD: 0.9 % (ref 0–1.9)
BILIRUB DIRECT SERPL-MCNC: 0.1 MG/DL (ref 0.1–0.3)
BILIRUB SERPL-MCNC: 0.3 MG/DL (ref 0.1–1)
DIFFERENTIAL METHOD: ABNORMAL
EOSINOPHIL # BLD AUTO: 0.3 K/UL (ref 0–0.5)
EOSINOPHIL NFR BLD: 3.1 % (ref 0–8)
ERYTHROCYTE [DISTWIDTH] IN BLOOD BY AUTOMATED COUNT: 14.2 % (ref 11.5–14.5)
HCT VFR BLD AUTO: 43.5 % (ref 37–48.5)
HGB BLD-MCNC: 14 G/DL (ref 12–16)
IMM GRANULOCYTES # BLD AUTO: 0.02 K/UL (ref 0–0.04)
IMM GRANULOCYTES NFR BLD AUTO: 0.2 % (ref 0–0.5)
LYMPHOCYTES # BLD AUTO: 2 K/UL (ref 1–4.8)
LYMPHOCYTES NFR BLD: 22.1 % (ref 18–48)
MCH RBC QN AUTO: 26.3 PG (ref 27–31)
MCHC RBC AUTO-ENTMCNC: 32.2 G/DL (ref 32–36)
MCV RBC AUTO: 82 FL (ref 82–98)
MONOCYTES # BLD AUTO: 0.7 K/UL (ref 0.3–1)
MONOCYTES NFR BLD: 7.6 % (ref 4–15)
NEUTROPHILS # BLD AUTO: 5.9 K/UL (ref 1.8–7.7)
NEUTROPHILS NFR BLD: 66.1 % (ref 38–73)
NRBC BLD-RTO: 0 /100 WBC
PLATELET # BLD AUTO: 298 K/UL (ref 150–450)
PMV BLD AUTO: 10.9 FL (ref 9.2–12.9)
PROT SERPL-MCNC: 7.9 G/DL (ref 6–8.4)
RBC # BLD AUTO: 5.33 M/UL (ref 4–5.4)
WBC # BLD AUTO: 8.93 K/UL (ref 3.9–12.7)

## 2021-07-26 PROCEDURE — 80076 HEPATIC FUNCTION PANEL: CPT | Performed by: PSYCHIATRY & NEUROLOGY

## 2021-07-26 PROCEDURE — 36415 COLL VENOUS BLD VENIPUNCTURE: CPT | Performed by: PSYCHIATRY & NEUROLOGY

## 2021-07-26 PROCEDURE — 85025 COMPLETE CBC W/AUTO DIFF WBC: CPT | Performed by: PSYCHIATRY & NEUROLOGY

## 2021-08-18 ENCOUNTER — PATIENT MESSAGE (OUTPATIENT)
Dept: NEUROLOGY | Facility: CLINIC | Age: 61
End: 2021-08-18

## 2021-08-18 DIAGNOSIS — G35 MS (MULTIPLE SCLEROSIS): Primary | ICD-10-CM

## 2021-08-19 ENCOUNTER — LAB VISIT (OUTPATIENT)
Dept: LAB | Facility: HOSPITAL | Age: 61
End: 2021-08-19
Attending: PSYCHIATRY & NEUROLOGY
Payer: COMMERCIAL

## 2021-08-19 DIAGNOSIS — G35 MS (MULTIPLE SCLEROSIS): ICD-10-CM

## 2021-08-19 LAB
ALBUMIN SERPL BCP-MCNC: 3.5 G/DL (ref 3.5–5.2)
ALBUMIN SERPL BCP-MCNC: 3.5 G/DL (ref 3.5–5.2)
ALP SERPL-CCNC: 112 U/L (ref 55–135)
ALP SERPL-CCNC: 112 U/L (ref 55–135)
ALT SERPL W/O P-5'-P-CCNC: 22 U/L (ref 10–44)
ALT SERPL W/O P-5'-P-CCNC: 22 U/L (ref 10–44)
ANION GAP SERPL CALC-SCNC: 13 MMOL/L (ref 8–16)
AST SERPL-CCNC: 41 U/L (ref 10–40)
AST SERPL-CCNC: 41 U/L (ref 10–40)
BACTERIA #/AREA URNS HPF: ABNORMAL /HPF
BASOPHILS # BLD AUTO: 0.09 K/UL (ref 0–0.2)
BASOPHILS NFR BLD: 1 % (ref 0–1.9)
BILIRUB DIRECT SERPL-MCNC: 0.1 MG/DL (ref 0.1–0.3)
BILIRUB SERPL-MCNC: 0.3 MG/DL (ref 0.1–1)
BILIRUB SERPL-MCNC: 0.3 MG/DL (ref 0.1–1)
BILIRUB UR QL STRIP: NEGATIVE
BUN SERPL-MCNC: 12 MG/DL (ref 6–20)
CALCIUM SERPL-MCNC: 9.6 MG/DL (ref 8.7–10.5)
CHLORIDE SERPL-SCNC: 104 MMOL/L (ref 95–110)
CLARITY UR: ABNORMAL
CO2 SERPL-SCNC: 22 MMOL/L (ref 23–29)
COLOR UR: YELLOW
CREAT SERPL-MCNC: 1 MG/DL (ref 0.5–1.4)
DIFFERENTIAL METHOD: ABNORMAL
EOSINOPHIL # BLD AUTO: 0.3 K/UL (ref 0–0.5)
EOSINOPHIL NFR BLD: 3.4 % (ref 0–8)
ERYTHROCYTE [DISTWIDTH] IN BLOOD BY AUTOMATED COUNT: 14.4 % (ref 11.5–14.5)
EST. GFR  (AFRICAN AMERICAN): >60 ML/MIN/1.73 M^2
EST. GFR  (NON AFRICAN AMERICAN): >60 ML/MIN/1.73 M^2
GLUCOSE SERPL-MCNC: 233 MG/DL (ref 70–110)
GLUCOSE UR QL STRIP: NEGATIVE
HCT VFR BLD AUTO: 41.8 % (ref 37–48.5)
HGB BLD-MCNC: 13.3 G/DL (ref 12–16)
HGB UR QL STRIP: ABNORMAL
HYALINE CASTS #/AREA URNS LPF: 0 /LPF
IMM GRANULOCYTES # BLD AUTO: 0.03 K/UL (ref 0–0.04)
IMM GRANULOCYTES NFR BLD AUTO: 0.3 % (ref 0–0.5)
KETONES UR QL STRIP: NEGATIVE
LEUKOCYTE ESTERASE UR QL STRIP: ABNORMAL
LYMPHOCYTES # BLD AUTO: 2.1 K/UL (ref 1–4.8)
LYMPHOCYTES NFR BLD: 23.8 % (ref 18–48)
MCH RBC QN AUTO: 26.3 PG (ref 27–31)
MCHC RBC AUTO-ENTMCNC: 31.8 G/DL (ref 32–36)
MCV RBC AUTO: 83 FL (ref 82–98)
MICROSCOPIC COMMENT: ABNORMAL
MONOCYTES # BLD AUTO: 0.6 K/UL (ref 0.3–1)
MONOCYTES NFR BLD: 6.5 % (ref 4–15)
NEUTROPHILS # BLD AUTO: 5.8 K/UL (ref 1.8–7.7)
NEUTROPHILS NFR BLD: 65 % (ref 38–73)
NITRITE UR QL STRIP: NEGATIVE
NRBC BLD-RTO: 0 /100 WBC
PH UR STRIP: 7 [PH] (ref 5–8)
PLATELET # BLD AUTO: 301 K/UL (ref 150–450)
PMV BLD AUTO: 10.9 FL (ref 9.2–12.9)
POTASSIUM SERPL-SCNC: 3.3 MMOL/L (ref 3.5–5.1)
PROT SERPL-MCNC: 8 G/DL (ref 6–8.4)
PROT SERPL-MCNC: 8 G/DL (ref 6–8.4)
PROT UR QL STRIP: ABNORMAL
RBC # BLD AUTO: 5.06 M/UL (ref 4–5.4)
RBC #/AREA URNS HPF: 40 /HPF (ref 0–4)
SODIUM SERPL-SCNC: 139 MMOL/L (ref 136–145)
SP GR UR STRIP: 1.02 (ref 1–1.03)
SQUAMOUS #/AREA URNS HPF: 10 /HPF
URN SPEC COLLECT METH UR: ABNORMAL
UROBILINOGEN UR STRIP-ACNC: NEGATIVE EU/DL
WBC # BLD AUTO: 8.9 K/UL (ref 3.9–12.7)
WBC #/AREA URNS HPF: >100 /HPF (ref 0–5)

## 2021-08-19 PROCEDURE — 80053 COMPREHEN METABOLIC PANEL: CPT | Performed by: PSYCHIATRY & NEUROLOGY

## 2021-08-19 PROCEDURE — 87186 SC STD MICRODIL/AGAR DIL: CPT | Performed by: PSYCHIATRY & NEUROLOGY

## 2021-08-19 PROCEDURE — 81000 URINALYSIS NONAUTO W/SCOPE: CPT | Performed by: PSYCHIATRY & NEUROLOGY

## 2021-08-19 PROCEDURE — 85025 COMPLETE CBC W/AUTO DIFF WBC: CPT | Performed by: PSYCHIATRY & NEUROLOGY

## 2021-08-19 PROCEDURE — 87077 CULTURE AEROBIC IDENTIFY: CPT | Performed by: PSYCHIATRY & NEUROLOGY

## 2021-08-19 PROCEDURE — 87086 URINE CULTURE/COLONY COUNT: CPT | Performed by: PSYCHIATRY & NEUROLOGY

## 2021-08-19 PROCEDURE — 36415 COLL VENOUS BLD VENIPUNCTURE: CPT | Performed by: PSYCHIATRY & NEUROLOGY

## 2021-08-19 PROCEDURE — 87088 URINE BACTERIA CULTURE: CPT | Performed by: PSYCHIATRY & NEUROLOGY

## 2021-08-22 LAB — BACTERIA UR CULT: ABNORMAL

## 2021-09-01 ENCOUNTER — PATIENT MESSAGE (OUTPATIENT)
Dept: PSYCHIATRY | Facility: CLINIC | Age: 61
End: 2021-09-01

## 2021-09-02 ENCOUNTER — PATIENT MESSAGE (OUTPATIENT)
Dept: PSYCHIATRY | Facility: CLINIC | Age: 61
End: 2021-09-02

## 2021-09-04 ENCOUNTER — PATIENT MESSAGE (OUTPATIENT)
Dept: NEUROLOGY | Facility: CLINIC | Age: 61
End: 2021-09-04

## 2021-12-21 ENCOUNTER — PATIENT MESSAGE (OUTPATIENT)
Dept: NEUROLOGY | Facility: CLINIC | Age: 61
End: 2021-12-21
Payer: COMMERCIAL

## 2022-01-12 ENCOUNTER — PATIENT MESSAGE (OUTPATIENT)
Dept: NEUROLOGY | Facility: CLINIC | Age: 62
End: 2022-01-12
Payer: COMMERCIAL

## 2022-01-12 DIAGNOSIS — G35 MS (MULTIPLE SCLEROSIS): Primary | ICD-10-CM

## 2022-01-12 NOTE — TELEPHONE ENCOUNTER
----- Message from Monae Johnson sent at 1/12/2022  1:35 PM CST -----  Pt  requesting call back in regards to taking a urine test       Pt@ [phone]

## 2022-01-13 ENCOUNTER — LAB VISIT (OUTPATIENT)
Dept: LAB | Facility: HOSPITAL | Age: 62
End: 2022-01-13
Attending: PSYCHIATRY & NEUROLOGY
Payer: COMMERCIAL

## 2022-01-13 DIAGNOSIS — G35 MS (MULTIPLE SCLEROSIS): ICD-10-CM

## 2022-01-13 LAB
ALBUMIN SERPL BCP-MCNC: 3.5 G/DL (ref 3.5–5.2)
ALP SERPL-CCNC: 142 U/L (ref 55–135)
ALT SERPL W/O P-5'-P-CCNC: 21 U/L (ref 10–44)
AST SERPL-CCNC: 28 U/L (ref 10–40)
BASOPHILS # BLD AUTO: 0.1 K/UL (ref 0–0.2)
BASOPHILS NFR BLD: 1 % (ref 0–1.9)
BILIRUB DIRECT SERPL-MCNC: 0.1 MG/DL (ref 0.1–0.3)
BILIRUB SERPL-MCNC: 0.3 MG/DL (ref 0.1–1)
DIFFERENTIAL METHOD: ABNORMAL
EOSINOPHIL # BLD AUTO: 0.3 K/UL (ref 0–0.5)
EOSINOPHIL NFR BLD: 2.9 % (ref 0–8)
ERYTHROCYTE [DISTWIDTH] IN BLOOD BY AUTOMATED COUNT: 14.8 % (ref 11.5–14.5)
HCT VFR BLD AUTO: 38.3 % (ref 37–48.5)
HGB BLD-MCNC: 11.8 G/DL (ref 12–16)
IMM GRANULOCYTES # BLD AUTO: 0.03 K/UL (ref 0–0.04)
IMM GRANULOCYTES NFR BLD AUTO: 0.3 % (ref 0–0.5)
LYMPHOCYTES # BLD AUTO: 2.3 K/UL (ref 1–4.8)
LYMPHOCYTES NFR BLD: 21.9 % (ref 18–48)
MCH RBC QN AUTO: 25.4 PG (ref 27–31)
MCHC RBC AUTO-ENTMCNC: 30.8 G/DL (ref 32–36)
MCV RBC AUTO: 83 FL (ref 82–98)
MONOCYTES # BLD AUTO: 0.6 K/UL (ref 0.3–1)
MONOCYTES NFR BLD: 5.9 % (ref 4–15)
NEUTROPHILS # BLD AUTO: 7.2 K/UL (ref 1.8–7.7)
NEUTROPHILS NFR BLD: 68 % (ref 38–73)
NRBC BLD-RTO: 0 /100 WBC
PLATELET # BLD AUTO: 354 K/UL (ref 150–450)
PMV BLD AUTO: 10 FL (ref 9.2–12.9)
PROT SERPL-MCNC: 8.3 G/DL (ref 6–8.4)
RBC # BLD AUTO: 4.64 M/UL (ref 4–5.4)
WBC # BLD AUTO: 10.52 K/UL (ref 3.9–12.7)

## 2022-01-13 PROCEDURE — 85025 COMPLETE CBC W/AUTO DIFF WBC: CPT | Performed by: PSYCHIATRY & NEUROLOGY

## 2022-01-13 PROCEDURE — 36415 COLL VENOUS BLD VENIPUNCTURE: CPT | Performed by: PSYCHIATRY & NEUROLOGY

## 2022-01-13 PROCEDURE — 80076 HEPATIC FUNCTION PANEL: CPT | Performed by: PSYCHIATRY & NEUROLOGY

## 2022-01-16 ENCOUNTER — PATIENT MESSAGE (OUTPATIENT)
Dept: NEUROLOGY | Facility: CLINIC | Age: 62
End: 2022-01-16
Payer: COMMERCIAL

## 2022-01-18 ENCOUNTER — SPECIALTY PHARMACY (OUTPATIENT)
Dept: PHARMACY | Facility: CLINIC | Age: 62
End: 2022-01-18
Payer: COMMERCIAL

## 2022-01-19 NOTE — TELEPHONE ENCOUNTER
Patient existing to therapy and filling with [facility]. Spoke with plan and confirmed PA is not required, but cost exceeds maximum override would be needed once Rx is at [facility]. Routed Rx to [facility].

## 2022-01-25 ENCOUNTER — TELEPHONE (OUTPATIENT)
Dept: NEUROLOGY | Facility: CLINIC | Age: 62
End: 2022-01-25

## 2022-01-26 ENCOUNTER — TELEPHONE (OUTPATIENT)
Dept: NEUROLOGY | Facility: CLINIC | Age: 62
End: 2022-01-26
Payer: COMMERCIAL

## 2022-01-28 NOTE — TELEPHONE ENCOUNTER
Notified via fax from [facility] that pt's Aubagio has been approved from 1/26/22-1/25/23.

## 2022-01-31 NOTE — TELEPHONE ENCOUNTER
Spoke with pt's  who reports lower extremity weakness for the last month. She has had a fall in the last month trying to get in/out of bed. He has been having to help her out of bed more frequency due to the weakness. She is currently finishing up antibiotics prescribed to her by her pcp for a UTI, and she is also currently on an antibiotics for her gums, which was prescribed by her dentist. Educated pt's  on pseudo-relapse.

## 2022-02-03 NOTE — TELEPHONE ENCOUNTER
Pt antibiotic was changed 2 days ago once the culture came back. Pt still continues to have weakness. Educated on pseudo-relapse. Pt scheduled for a virtual visit with Jackie on 2/10/22.

## 2022-02-10 ENCOUNTER — OFFICE VISIT (OUTPATIENT)
Dept: NEUROLOGY | Facility: CLINIC | Age: 62
End: 2022-02-10
Payer: COMMERCIAL

## 2022-02-10 ENCOUNTER — TELEPHONE (OUTPATIENT)
Dept: NEUROLOGY | Facility: CLINIC | Age: 62
End: 2022-02-10

## 2022-02-10 DIAGNOSIS — R29.6 FREQUENT FALLS: ICD-10-CM

## 2022-02-10 DIAGNOSIS — G35 MULTIPLE SCLEROSIS: Primary | ICD-10-CM

## 2022-02-10 DIAGNOSIS — N39.0 FREQUENT UTI: ICD-10-CM

## 2022-02-10 DIAGNOSIS — Z71.89 COUNSELING REGARDING GOALS OF CARE: ICD-10-CM

## 2022-02-10 DIAGNOSIS — Z29.89 PROPHYLACTIC IMMUNOTHERAPY: ICD-10-CM

## 2022-02-10 PROCEDURE — 1159F MED LIST DOCD IN RCRD: CPT | Mod: CPTII,95,, | Performed by: CLINICAL NURSE SPECIALIST

## 2022-02-10 PROCEDURE — 99212 PR OFFICE/OUTPT VISIT, EST, LEVL II, 10-19 MIN: ICD-10-PCS | Mod: 95,,, | Performed by: CLINICAL NURSE SPECIALIST

## 2022-02-10 PROCEDURE — 1159F PR MEDICATION LIST DOCUMENTED IN MEDICAL RECORD: ICD-10-PCS | Mod: CPTII,95,, | Performed by: CLINICAL NURSE SPECIALIST

## 2022-02-10 PROCEDURE — 99212 OFFICE O/P EST SF 10 MIN: CPT | Mod: 95,,, | Performed by: CLINICAL NURSE SPECIALIST

## 2022-02-10 NOTE — Clinical Note
CM, please schedule UA at [facility] the week of 2/21.  VV with me in 4 weeks  CBC and LFT at [facility] in mid-April

## 2022-02-10 NOTE — Clinical Note
SP, can you call [facility] to check on the status of her Aubagio? She has been without medication since December. It looks like the Rx was sent in January.

## 2022-02-10 NOTE — PROGRESS NOTES
Subjective:          Patient ID: Deb Figueroa is a 61 y.o. female who presents today for a routine virtual visit for MS.  She was last seen by Dr. Zimmer in April 2021. The history has been provided by the patient.     The patient location is: her home   The chief complaint leading to consultation is: MS     Visit type: audiovisual    Face to Face time with patient: 15 minutes   25 minutes of total time spent on the encounter, which includes face to face time and non-face to face time preparing to see the patient (eg, review of tests), Obtaining and/or reviewing separately obtained history, Documenting clinical information in the electronic or other health record, Independently interpreting results (not separately reported) and communicating results to the patient/family/caregiver, or Care coordination (not separately reported).       Each patient to whom he or she provides medical services by telemedicine is:  (1) informed of the relationship between the physician and patient and the respective role of any other health care provider with respect to management of the patient; and (2) notified that he or she may decline to receive medical services by telemedicine and may withdraw from such care at any time.      MS HPI:  · DMT: teriflunomide--she has been without medication since December  · She has had a few UTIs since the last visit that made her very weak. She has one now, but will finish antibiotics tomorrow.   · She has been falling a lot. Her  has to help her with all transfers. Before Christmas, she was able to do this on her own.   · She has had some urinary incontinence because she is not getting out of bed to go to the bathroom.     Medications:  Current Outpatient Medications   Medication Sig    amlodipine (NORVASC) 5 MG tablet Take 5 mg by mouth once daily.    AUBAGIO 14 mg Tab TAKE 1 TABLET DAILY    buPROPion (WELLBUTRIN XL) 150 MG TB24 tablet Take 1 tablet (150 mg total) by mouth once daily.     escitalopram oxalate (LEXAPRO) 20 MG tablet TAKE 1 TABLET (20 MG TOTAL) BY MOUTH EVERY EVENING.    lorazepam (ATIVAN) 2 MG Tab Take 1 tablet (2 mg total) by mouth 3 (three) times daily as needed. (Patient taking differently: Take 2 mg by mouth every evening. )    nitrofurantoin, macrocrystal-monohydrate, (MACROBID) 100 MG capsule Take 1 capsule (100 mg total) by mouth 2 (two) times daily.    rosuvastatin (CRESTOR) 10 MG tablet Take 10 mg by mouth every evening.     solifenacin (VESICARE) 10 MG tablet Take 1 tablet (10 mg total) by mouth once daily.     SOCIAL HISTORY  Social History     Tobacco Use    Smoking status: Never Smoker    Smokeless tobacco: Never Used   Substance Use Topics    Alcohol use: No    Drug use: No       Living arrangements - the patient lives with their spouse.         Objective:        Neurologic Exam    Deferred   Imaging:       Results for orders placed during the hospital encounter of 03/06/20    MRI Brain Demyelinating W W/O Contrast    Impression  Overall stable findings compatible with non active demyelinating disease affecting the brain and spinal cord.      Electronically signed by: Nate Rivera Jr  Date:    03/07/2020  Time:    15:28    Results for orders placed during the hospital encounter of 03/06/20    MRI Cervical Spine Demyelinating W W/O Contrast    Impression  Overall stable findings compatible with non active demyelinating disease affecting the brain and spinal cord.      Electronically signed by: Nate Rivera Jr  Date:    03/07/2020  Time:    15:28    Results for orders placed during the hospital encounter of 03/06/20    MRI Thoracic Spine Demyelinating W W/O Contrast    Impression  Overall stable findings compatible with non active demyelinating disease affecting the brain and spinal cord.      Electronically signed by: Nate Rivera Jr  Date:    03/07/2020  Time:    15:28        Labs:     Lab Results   Component Value Date    VSWQFFPN13HI 20 (L)  03/06/2020    JGBWRIKS64WA 7 (L) 07/18/2018    JVZTFIDM95MI 15 (L) 11/28/2016     Lab Results   Component Value Date    JCVINDEX 0.17 07/18/2018    JCVANTIBODY Negative 07/18/2018     Lab Results   Component Value Date    WBC 10.52 01/13/2022    RBC 4.64 01/13/2022    HGB 11.8 (L) 01/13/2022    HCT 38.3 01/13/2022    MCV 83 01/13/2022    MCH 25.4 (L) 01/13/2022    MCHC 30.8 (L) 01/13/2022    RDW 14.8 (H) 01/13/2022     01/13/2022    MPV 10.0 01/13/2022    GRAN 7.2 01/13/2022    GRAN 68.0 01/13/2022    LYMPH 2.3 01/13/2022    LYMPH 21.9 01/13/2022    MONO 0.6 01/13/2022    MONO 5.9 01/13/2022    EOS 0.3 01/13/2022    BASO 0.10 01/13/2022    EOSINOPHIL 2.9 01/13/2022    BASOPHIL 1.0 01/13/2022     Sodium   Date Value Ref Range Status   08/19/2021 139 136 - 145 mmol/L Final     Potassium   Date Value Ref Range Status   08/19/2021 3.3 (L) 3.5 - 5.1 mmol/L Final     Chloride   Date Value Ref Range Status   08/19/2021 104 95 - 110 mmol/L Final     CO2   Date Value Ref Range Status   08/19/2021 22 (L) 23 - 29 mmol/L Final     Glucose   Date Value Ref Range Status   08/19/2021 233 (H) 70 - 110 mg/dL Final     BUN   Date Value Ref Range Status   08/19/2021 12 6 - 20 mg/dL Final     Creatinine   Date Value Ref Range Status   08/19/2021 1.0 0.5 - 1.4 mg/dL Final     Calcium   Date Value Ref Range Status   08/19/2021 9.6 8.7 - 10.5 mg/dL Final     Total Protein   Date Value Ref Range Status   01/13/2022 8.3 6.0 - 8.4 g/dL Final     Albumin   Date Value Ref Range Status   01/13/2022 3.5 3.5 - 5.2 g/dL Final     Total Bilirubin   Date Value Ref Range Status   01/13/2022 0.3 0.1 - 1.0 mg/dL Final     Comment:     For infants and newborns, interpretation of results should be based  on gestational age, weight and in agreement with clinical  observations.    Premature Infant recommended reference ranges:  Up to 24 hours.............<8.0 mg/dL  Up to 48 hours............<12.0 mg/dL  3-5 days..................<15.0 mg/dL  6-29  days.................<15.0 mg/dL       Alkaline Phosphatase   Date Value Ref Range Status   01/13/2022 142 (H) 55 - 135 U/L Final     AST   Date Value Ref Range Status   01/13/2022 28 10 - 40 U/L Final     ALT   Date Value Ref Range Status   01/13/2022 21 10 - 44 U/L Final     Anion Gap   Date Value Ref Range Status   08/19/2021 13 8 - 16 mmol/L Final     eGFR if    Date Value Ref Range Status   08/19/2021 >60 >60 mL/min/1.73 m^2 Final     eGFR if non    Date Value Ref Range Status   08/19/2021 >60 >60 mL/min/1.73 m^2 Final     Comment:     Calculation used to obtain the estimated glomerular filtration  rate (eGFR) is the CKD-EPI equation.        Lab Results   Component Value Date    HEPBSAG Negative 03/08/2020    HEPBSAB Negative 03/08/2020    HEPBCAB Negative 03/08/2020           MS Impression and Plan:     NEURO MULTIPLE SCLEROSIS IMPRESSION:   MS Status:     Number of relapses in the past year? comment:  I suspect that she has had a pseudorelapse, as well as some deconditioning. We will recheck her urine in a week (per her 's request so he can bring her to lab). If UTI is not resolved, we can treat with additional antibiotics. I also recommend physical therapy for overall strengthening and transfer training. Order to be sent to [facility]. She is agreeable to this.   Plan:     DMT:  No change in management    DMT comment:  She has been without Aubagio since December. We will call her pharmacy to figure out why this delay is happening. Once she receives medication, she will restart Aubagio.  We will plan to check CBC and LFT in mid-April.     Symptom Management:  No change in symptom management       I will see her back in a virtual visit in 4 weeks to follow up.       JANES Jiang, CNS     Problem List Items Addressed This Visit    None     Visit Diagnoses     Multiple sclerosis    -  Primary    Relevant Orders    Ambulatory referral/consult to Physical/Occupational  Therapy    CBC auto differential    Hepatic function panel    Frequent UTI        Relevant Orders    Urinalysis, Reflex to Urine Culture Urine, Clean Catch

## 2022-02-11 NOTE — TELEPHONE ENCOUNTER
----- Message from JANES Lynch, CNS sent at 2/10/2022  4:30 PM CST -----  SP, [facility] the status of her Aubagio? She has been without medication since December. It looks like the Rx was sent in January.

## 2022-02-14 ENCOUNTER — TELEPHONE (OUTPATIENT)
Dept: NEUROLOGY | Facility: CLINIC | Age: 62
End: 2022-02-14
Payer: COMMERCIAL

## 2022-02-14 ENCOUNTER — DOCUMENTATION ONLY (OUTPATIENT)
Dept: NEUROLOGY | Facility: CLINIC | Age: 62
End: 2022-02-14
Payer: COMMERCIAL

## 2022-02-14 NOTE — TELEPHONE ENCOUNTER
----- Message from JANES Lynch, CNS sent at 2/10/2022  4:32 PM CST -----  CM, please schedule UA at [facility] the week of 2/21.   VV with me in 4 weeks   CBC and LFT at [facility] in mid-April

## 2022-02-19 ENCOUNTER — PATIENT MESSAGE (OUTPATIENT)
Dept: NEUROLOGY | Facility: CLINIC | Age: 62
End: 2022-02-19
Payer: COMMERCIAL

## 2022-02-21 ENCOUNTER — LAB VISIT (OUTPATIENT)
Dept: LAB | Facility: HOSPITAL | Age: 62
End: 2022-02-21
Attending: CLINICAL NURSE SPECIALIST
Payer: COMMERCIAL

## 2022-02-21 DIAGNOSIS — N39.0 FREQUENT UTI: ICD-10-CM

## 2022-02-21 LAB
BACTERIA #/AREA URNS HPF: ABNORMAL /HPF
BILIRUB UR QL STRIP: NEGATIVE
CLARITY UR: CLEAR
COLOR UR: YELLOW
GLUCOSE UR QL STRIP: NEGATIVE
HGB UR QL STRIP: ABNORMAL
KETONES UR QL STRIP: NEGATIVE
LEUKOCYTE ESTERASE UR QL STRIP: ABNORMAL
MICROSCOPIC COMMENT: ABNORMAL
NITRITE UR QL STRIP: NEGATIVE
PH UR STRIP: 7 [PH] (ref 5–8)
PROT UR QL STRIP: ABNORMAL
RBC #/AREA URNS HPF: 35 /HPF (ref 0–4)
SP GR UR STRIP: 1.02 (ref 1–1.03)
SQUAMOUS #/AREA URNS HPF: 3 /HPF
URN SPEC COLLECT METH UR: ABNORMAL
UROBILINOGEN UR STRIP-ACNC: NEGATIVE EU/DL
WBC #/AREA URNS HPF: 5 /HPF (ref 0–5)

## 2022-02-21 PROCEDURE — 81000 URINALYSIS NONAUTO W/SCOPE: CPT | Performed by: CLINICAL NURSE SPECIALIST

## 2022-02-21 PROCEDURE — 81003 URINALYSIS AUTO W/O SCOPE: CPT | Performed by: CLINICAL NURSE SPECIALIST

## 2022-02-22 ENCOUNTER — PATIENT MESSAGE (OUTPATIENT)
Dept: NEUROLOGY | Facility: CLINIC | Age: 62
End: 2022-02-22
Payer: COMMERCIAL

## 2022-02-22 DIAGNOSIS — G35 MULTIPLE SCLEROSIS: ICD-10-CM

## 2022-02-22 DIAGNOSIS — G50.0 TRIGEMINAL NEURALGIA: Primary | ICD-10-CM

## 2022-02-22 RX ORDER — OXCARBAZEPINE 300 MG/1
300 TABLET, FILM COATED ORAL 2 TIMES DAILY
Qty: 60 TABLET | Refills: 2 | Status: SHIPPED | OUTPATIENT
Start: 2022-02-22 | End: 2022-04-07

## 2022-02-22 NOTE — TELEPHONE ENCOUNTER
Pt's  also sent the following message:    The appointments above are confirmed . Deb has been having a lot of pain in her mouth we have been to the dentist but where the pain is she has no teeth there. A few years back she had this problem and Dr. Moon did test and found out she had Trigeminal neuralgia. He prescribed Oxcarbazepine she had 1 refill left and has been taking it and it seems to be working. I wanted to see if there is any way she could get the prescription refilled.     Thanks,  Jayesh Figueroa  [phone]

## 2022-02-23 ENCOUNTER — PATIENT MESSAGE (OUTPATIENT)
Dept: NEUROLOGY | Facility: CLINIC | Age: 62
End: 2022-02-23
Payer: COMMERCIAL

## 2022-02-28 ENCOUNTER — PATIENT MESSAGE (OUTPATIENT)
Dept: PSYCHIATRY | Facility: CLINIC | Age: 62
End: 2022-02-28
Payer: COMMERCIAL

## 2022-03-02 ENCOUNTER — LAB VISIT (OUTPATIENT)
Dept: LAB | Facility: HOSPITAL | Age: 62
End: 2022-03-02
Attending: CLINICAL NURSE SPECIALIST
Payer: COMMERCIAL

## 2022-03-02 DIAGNOSIS — G35 MS (MULTIPLE SCLEROSIS): ICD-10-CM

## 2022-03-02 DIAGNOSIS — G50.0 TRIGEMINAL NEURALGIA: ICD-10-CM

## 2022-03-02 LAB
BACTERIA #/AREA URNS HPF: ABNORMAL /HPF
BILIRUB UR QL STRIP: NEGATIVE
CLARITY UR: ABNORMAL
COLOR UR: YELLOW
GLUCOSE UR QL STRIP: NEGATIVE
HGB UR QL STRIP: ABNORMAL
HYALINE CASTS #/AREA URNS LPF: 0 /LPF
KETONES UR QL STRIP: NEGATIVE
LEUKOCYTE ESTERASE UR QL STRIP: ABNORMAL
MICROSCOPIC COMMENT: ABNORMAL
NITRITE UR QL STRIP: NEGATIVE
PH UR STRIP: 6 [PH] (ref 5–8)
PROT UR QL STRIP: ABNORMAL
RBC #/AREA URNS HPF: 4 /HPF (ref 0–4)
SP GR UR STRIP: 1.02 (ref 1–1.03)
SQUAMOUS #/AREA URNS HPF: 4 /HPF
URN SPEC COLLECT METH UR: ABNORMAL
UROBILINOGEN UR STRIP-ACNC: NEGATIVE EU/DL
WBC #/AREA URNS HPF: >100 /HPF (ref 0–5)

## 2022-03-02 PROCEDURE — 81000 URINALYSIS NONAUTO W/SCOPE: CPT | Performed by: PSYCHIATRY & NEUROLOGY

## 2022-03-02 PROCEDURE — 87088 URINE BACTERIA CULTURE: CPT | Performed by: PSYCHIATRY & NEUROLOGY

## 2022-03-02 PROCEDURE — 87186 SC STD MICRODIL/AGAR DIL: CPT | Performed by: PSYCHIATRY & NEUROLOGY

## 2022-03-02 PROCEDURE — 87086 URINE CULTURE/COLONY COUNT: CPT | Performed by: PSYCHIATRY & NEUROLOGY

## 2022-03-02 PROCEDURE — 87077 CULTURE AEROBIC IDENTIFY: CPT | Performed by: PSYCHIATRY & NEUROLOGY

## 2022-03-02 PROCEDURE — 80183 DRUG SCRN QUANT OXCARBAZEPIN: CPT | Performed by: CLINICAL NURSE SPECIALIST

## 2022-03-02 PROCEDURE — 36415 COLL VENOUS BLD VENIPUNCTURE: CPT | Performed by: CLINICAL NURSE SPECIALIST

## 2022-03-05 LAB
BACTERIA UR CULT: ABNORMAL
OXCARBAZEPINE METABOLITE: 10 MCG/ML (ref 10–35)

## 2022-03-08 ENCOUNTER — PATIENT MESSAGE (OUTPATIENT)
Dept: NEUROLOGY | Facility: CLINIC | Age: 62
End: 2022-03-08
Payer: COMMERCIAL

## 2022-03-10 ENCOUNTER — PATIENT MESSAGE (OUTPATIENT)
Dept: NEUROLOGY | Facility: CLINIC | Age: 62
End: 2022-03-10
Payer: COMMERCIAL

## 2022-03-14 ENCOUNTER — OFFICE VISIT (OUTPATIENT)
Dept: NEUROLOGY | Facility: CLINIC | Age: 62
End: 2022-03-14
Payer: COMMERCIAL

## 2022-03-14 DIAGNOSIS — G35 MULTIPLE SCLEROSIS: Primary | ICD-10-CM

## 2022-03-14 DIAGNOSIS — N31.9 NEUROGENIC DYSFUNCTION OF THE URINARY BLADDER: ICD-10-CM

## 2022-03-14 DIAGNOSIS — Z29.89 PROPHYLACTIC IMMUNOTHERAPY: ICD-10-CM

## 2022-03-14 DIAGNOSIS — Z71.89 COUNSELING REGARDING GOALS OF CARE: ICD-10-CM

## 2022-03-14 DIAGNOSIS — R26.9 GAIT DISTURBANCE: ICD-10-CM

## 2022-03-14 PROCEDURE — 99214 OFFICE O/P EST MOD 30 MIN: CPT | Mod: 95,,, | Performed by: CLINICAL NURSE SPECIALIST

## 2022-03-14 PROCEDURE — 1159F PR MEDICATION LIST DOCUMENTED IN MEDICAL RECORD: ICD-10-PCS | Mod: CPTII,95,, | Performed by: CLINICAL NURSE SPECIALIST

## 2022-03-14 PROCEDURE — 1159F MED LIST DOCD IN RCRD: CPT | Mod: CPTII,95,, | Performed by: CLINICAL NURSE SPECIALIST

## 2022-03-14 PROCEDURE — 99214 PR OFFICE/OUTPT VISIT, EST, LEVL IV, 30-39 MIN: ICD-10-PCS | Mod: 95,,, | Performed by: CLINICAL NURSE SPECIALIST

## 2022-03-14 RX ORDER — SULFAMETHOXAZOLE AND TRIMETHOPRIM 800; 160 MG/1; MG/1
1 TABLET ORAL 2 TIMES DAILY
Qty: 6 TABLET | Refills: 0 | Status: SHIPPED | OUTPATIENT
Start: 2022-03-14 | End: 2022-03-17

## 2022-03-14 NOTE — Clinical Note
UR, please contact Deb and her  to schedule MRIs here at main campus at their convenience (morning time) and then a follow-up in clinic with me the same day. Thanks.
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
## 2022-03-15 ENCOUNTER — PATIENT MESSAGE (OUTPATIENT)
Dept: NEUROLOGY | Facility: CLINIC | Age: 62
End: 2022-03-15
Payer: COMMERCIAL

## 2022-03-17 ENCOUNTER — TELEPHONE (OUTPATIENT)
Dept: NEUROLOGY | Facility: CLINIC | Age: 62
End: 2022-03-17
Payer: COMMERCIAL

## 2022-03-17 ENCOUNTER — PATIENT MESSAGE (OUTPATIENT)
Dept: NEUROLOGY | Facility: CLINIC | Age: 62
End: 2022-03-17
Payer: COMMERCIAL

## 2022-03-17 NOTE — TELEPHONE ENCOUNTER
Unable to reach patient via phone sent patient [employer] message    ----- Message from JANES Lynch, CNS sent at 3/14/2022  4:42 PM CDT -----  UR, please contact Deb and her  to schedule MRIs here at main campus at their convenience (morning time) and then a follow-up in clinic with me the same day. Thanks.

## 2022-03-21 ENCOUNTER — DOCUMENTATION ONLY (OUTPATIENT)
Dept: NEUROLOGY | Facility: CLINIC | Age: 62
End: 2022-03-21
Payer: COMMERCIAL

## 2022-04-07 ENCOUNTER — HOSPITAL ENCOUNTER (OUTPATIENT)
Dept: RADIOLOGY | Facility: HOSPITAL | Age: 62
Discharge: HOME | End: 2022-04-07
Attending: CLINICAL NURSE SPECIALIST
Payer: COMMERCIAL

## 2022-04-07 ENCOUNTER — OFFICE VISIT (OUTPATIENT)
Dept: NEUROLOGY | Facility: CLINIC | Age: 62
End: 2022-04-07
Payer: COMMERCIAL

## 2022-04-07 VITALS
HEIGHT: 65 IN | WEIGHT: 182.63 LBS | SYSTOLIC BLOOD PRESSURE: 129 MMHG | BODY MASS INDEX: 30.43 KG/M2 | DIASTOLIC BLOOD PRESSURE: 84 MMHG | HEART RATE: 95 BPM

## 2022-04-07 DIAGNOSIS — G35 MULTIPLE SCLEROSIS: ICD-10-CM

## 2022-04-07 DIAGNOSIS — G50.0 TRIGEMINAL NEURALGIA OF LEFT SIDE OF FACE: ICD-10-CM

## 2022-04-07 DIAGNOSIS — Z29.89 PROPHYLACTIC IMMUNOTHERAPY: ICD-10-CM

## 2022-04-07 DIAGNOSIS — Z71.89 COUNSELING REGARDING GOALS OF CARE: ICD-10-CM

## 2022-04-07 DIAGNOSIS — Z79.899 HIGH RISK MEDICATION USE: ICD-10-CM

## 2022-04-07 DIAGNOSIS — G35 MULTIPLE SCLEROSIS: Primary | ICD-10-CM

## 2022-04-07 PROCEDURE — 25500020 PHARM REV CODE 255: Performed by: CLINICAL NURSE SPECIALIST

## 2022-04-07 PROCEDURE — 72157 MRI CHEST SPINE W/O & W/DYE: CPT | Mod: TC

## 2022-04-07 PROCEDURE — 3074F SYST BP LT 130 MM HG: CPT | Mod: CPTII,S$GLB,, | Performed by: CLINICAL NURSE SPECIALIST

## 2022-04-07 PROCEDURE — 70553 MRI BRAIN DEMYELINATING W/ WO CONTRAST: ICD-10-PCS | Mod: 26,,, | Performed by: RADIOLOGY

## 2022-04-07 PROCEDURE — 70553 MRI BRAIN STEM W/O & W/DYE: CPT | Mod: 26,,, | Performed by: RADIOLOGY

## 2022-04-07 PROCEDURE — 99999 PR PBB SHADOW E&M-EST. PATIENT-LVL IV: CPT | Mod: PBBFAC,,, | Performed by: CLINICAL NURSE SPECIALIST

## 2022-04-07 PROCEDURE — 99999 PR PBB SHADOW E&M-EST. PATIENT-LVL IV: ICD-10-PCS | Mod: PBBFAC,,, | Performed by: CLINICAL NURSE SPECIALIST

## 2022-04-07 PROCEDURE — 99215 OFFICE O/P EST HI 40 MIN: CPT | Mod: S$GLB,,, | Performed by: CLINICAL NURSE SPECIALIST

## 2022-04-07 PROCEDURE — 70553 MRI BRAIN STEM W/O & W/DYE: CPT | Mod: TC

## 2022-04-07 PROCEDURE — 1159F PR MEDICATION LIST DOCUMENTED IN MEDICAL RECORD: ICD-10-PCS | Mod: CPTII,S$GLB,, | Performed by: CLINICAL NURSE SPECIALIST

## 2022-04-07 PROCEDURE — 99215 PR OFFICE/OUTPT VISIT, EST, LEVL V, 40-54 MIN: ICD-10-PCS | Mod: S$GLB,,, | Performed by: CLINICAL NURSE SPECIALIST

## 2022-04-07 PROCEDURE — 3074F PR MOST RECENT SYSTOLIC BLOOD PRESSURE < 130 MM HG: ICD-10-PCS | Mod: CPTII,S$GLB,, | Performed by: CLINICAL NURSE SPECIALIST

## 2022-04-07 PROCEDURE — 1159F MED LIST DOCD IN RCRD: CPT | Mod: CPTII,S$GLB,, | Performed by: CLINICAL NURSE SPECIALIST

## 2022-04-07 PROCEDURE — A9585 GADOBUTROL INJECTION: HCPCS | Performed by: CLINICAL NURSE SPECIALIST

## 2022-04-07 PROCEDURE — 3008F PR BODY MASS INDEX (BMI) DOCUMENTED: ICD-10-PCS | Mod: CPTII,S$GLB,, | Performed by: CLINICAL NURSE SPECIALIST

## 2022-04-07 PROCEDURE — 3079F DIAST BP 80-89 MM HG: CPT | Mod: CPTII,S$GLB,, | Performed by: CLINICAL NURSE SPECIALIST

## 2022-04-07 PROCEDURE — 3079F PR MOST RECENT DIASTOLIC BLOOD PRESSURE 80-89 MM HG: ICD-10-PCS | Mod: CPTII,S$GLB,, | Performed by: CLINICAL NURSE SPECIALIST

## 2022-04-07 PROCEDURE — 3008F BODY MASS INDEX DOCD: CPT | Mod: CPTII,S$GLB,, | Performed by: CLINICAL NURSE SPECIALIST

## 2022-04-07 PROCEDURE — 72156 MRI NECK SPINE W/O & W/DYE: CPT | Mod: TC

## 2022-04-07 RX ORDER — CHLORHEXIDINE GLUCONATE ORAL RINSE 1.2 MG/ML
SOLUTION DENTAL
COMMUNITY
Start: 2022-03-22

## 2022-04-07 RX ORDER — AMOXICILLIN 500 MG/1
CAPSULE ORAL
COMMUNITY
End: 2022-05-16

## 2022-04-07 RX ORDER — OXCARBAZEPINE 600 MG/1
600 TABLET, FILM COATED ORAL 2 TIMES DAILY
Qty: 60 TABLET | Refills: 5 | Status: SHIPPED | OUTPATIENT
Start: 2022-04-07 | End: 2022-10-25

## 2022-04-07 RX ORDER — CLINDAMYCIN HYDROCHLORIDE 150 MG/1
150 CAPSULE ORAL 4 TIMES DAILY
COMMUNITY
Start: 2022-02-16 | End: 2022-05-16

## 2022-04-07 RX ORDER — GADOBUTROL 604.72 MG/ML
6 INJECTION INTRAVENOUS
Status: COMPLETED | OUTPATIENT
Start: 2022-04-07 | End: 2022-04-07

## 2022-04-07 RX ORDER — MIRABEGRON 25 MG/1
TABLET, FILM COATED, EXTENDED RELEASE ORAL
COMMUNITY
End: 2022-08-08

## 2022-04-07 RX ADMIN — GADOBUTROL 6 ML: 604.72 INJECTION INTRAVENOUS at 10:04

## 2022-04-07 NOTE — PROGRESS NOTES
Subjective:          Patient ID: Deb Figueroa is a 61 y.o. female who presents today for a routine clinic visit for MS and power mobility evaluation.  She was last seen virtually in March. The history has been provided by the patient.       MS HPI:  · DMT: teriflunomide  · Side effects from DMT? No  · Taking vitamin D3 as recommended? Yes -  Dose:    She has 4 kidney stones. She will have them removed next Thursday. One of them is blocking the kidney. This procedure will be [facility]--Dr. Flores.  · She has terrible TN pain. She is taking oxcarbazepine 300mg twice daily. This pain has more intense in the past few weeks.   · She went to PT once since the last visit. She plans to go back once her kidney stone issues are resolved.   · Her  lifts her for transfers. She is unable to lift her foot. She falls often either during transfers or when bending over from the chair.   · She has a scooter that she uses when she goes out. She has had this for about 8-9 years. She feels steady in the scooter. She uses a manual wheelchair in the house. She would like to get a new scooter.   · Before Anurag, she was a lot more independent and was able to dress herself, get food for herself, etc. She is hoping to return to her baseline.     Medications:  Current Outpatient Medications   Medication Sig    amlodipine (NORVASC) 5 MG tablet Take 5 mg by mouth once daily.    AUBAGIO 14 mg Tab TAKE 1 TABLET DAILY    buPROPion (WELLBUTRIN XL) 150 MG TB24 tablet Take 1 tablet (150 mg total) by mouth once daily.    escitalopram oxalate (LEXAPRO) 20 MG tablet TAKE 1 TABLET (20 MG TOTAL) BY MOUTH EVERY EVENING.    lorazepam (ATIVAN) 2 MG Tab Take 1 tablet (2 mg total) by mouth 3 (three) times daily as needed. (Patient taking differently: Take 2 mg by mouth every evening.)    OXcarbazepine (TRILEPTAL) 300 MG Tab Take 1 tablet (300 mg total) by mouth 2 (two) times daily.    rosuvastatin (CRESTOR) 10 MG tablet Take 10 mg by  mouth every evening.     solifenacin (VESICARE) 10 MG tablet Take 1 tablet (10 mg total) by mouth once daily. (Patient not taking: Reported on 3/14/2022)         SOCIAL HISTORY  Social History     Tobacco Use    Smoking status: Never Smoker    Smokeless tobacco: Never Used   Substance Use Topics    Alcohol use: No    Drug use: No       Living arrangements - the patient lives with their spouse.           Objective:        Neurologic Exam     Mental Status   Oriented to person, place, and time.   Attention: normal. Concentration: normal.   Speech: speech is normal   Level of consciousness: alert  Knowledge: consistent with education.   She looked to her  to answer most questions.      Cranial Nerves     CN V   Right facial sensation deficit: none  Left facial sensation deficit: none    CN VIII   Hearing: intact  Mild right EVAN      Motor Exam     Strength   Right biceps: 4/5  Left biceps: 4/5  Right triceps: 4/5  Left triceps: 4/5  Right wrist flexion: 4/5  Left wrist flexion: 3/5  Right wrist extension: 4/5  Left wrist extension: 3/5  Right interossei: 4/5  Left interossei: 3/5  Right iliopsoas: 1/5  Left iliopsoas: 1/5  Right quadriceps: 2/5  Left quadriceps: 2/5  Right hamstrin/5  Left hamstrin/5  Right anterior tibial: 2/5  Left anterior tibial: 1/5  Right gastroc: 2/5  Left gastroc: 1/5    Sensory Exam   Right arm vibration: normal  Left arm vibration: normal  She does not feel vibration or light touch in the bilateral lower extremities      Gait, Coordination, and Reflexes     Gait  Gait: (unable )    Coordination   Finger to nose coordination: abnormal (dysmetria bilaterally )    Reflexes   Right biceps: 2+  Left biceps: 2+  Right patellar: 3+  Left patellar: 3+  Right achilles: 1+  Left achilles: 1+  Right plantar: equivocal  Left plantar: equivocal  She has ataxia with normal sitting           Imaging:         Results for orders placed during the hospital encounter of 20    MRI Brain  Demyelinating W W/O Contrast    Impression  Overall stable findings compatible with non active demyelinating disease affecting the brain and spinal cord.      Electronically signed by: Nate Rivera Jr  Date:    03/07/2020  Time:    15:28    Results for orders placed during the hospital encounter of 03/06/20    MRI Cervical Spine Demyelinating W W/O Contrast    Impression  Overall stable findings compatible with non active demyelinating disease affecting the brain and spinal cord.      Electronically signed by: Nate Rivera Jr  Date:    03/07/2020  Time:    15:28    Results for orders placed during the hospital encounter of 03/06/20    MRI Thoracic Spine Demyelinating W W/O Contrast    Impression  Overall stable findings compatible with non active demyelinating disease affecting the brain and spinal cord.      Electronically signed by: Nate Rivera Jr  Date:    03/07/2020  Time:    15:28        Labs:     Lab Results   Component Value Date    BEJKPTRI26WL 20 (L) 03/06/2020    AANTMEZX60SM 7 (L) 07/18/2018    GWWFBFRC05XR 15 (L) 11/28/2016     Lab Results   Component Value Date    JCVINDEX 0.17 07/18/2018    JCVANTIBODY Negative 07/18/2018     Lab Results   Component Value Date    WBC 10.52 01/13/2022    RBC 4.64 01/13/2022    HGB 11.8 (L) 01/13/2022    HCT 38.3 01/13/2022    MCV 83 01/13/2022    MCH 25.4 (L) 01/13/2022    MCHC 30.8 (L) 01/13/2022    RDW 14.8 (H) 01/13/2022     01/13/2022    MPV 10.0 01/13/2022    GRAN 7.2 01/13/2022    GRAN 68.0 01/13/2022    LYMPH 2.3 01/13/2022    LYMPH 21.9 01/13/2022    MONO 0.6 01/13/2022    MONO 5.9 01/13/2022    EOS 0.3 01/13/2022    BASO 0.10 01/13/2022    EOSINOPHIL 2.9 01/13/2022    BASOPHIL 1.0 01/13/2022     Sodium   Date Value Ref Range Status   08/19/2021 139 136 - 145 mmol/L Final     Potassium   Date Value Ref Range Status   08/19/2021 3.3 (L) 3.5 - 5.1 mmol/L Final     Chloride   Date Value Ref Range Status   08/19/2021 104 95 - 110 mmol/L Final      CO2   Date Value Ref Range Status   08/19/2021 22 (L) 23 - 29 mmol/L Final     Glucose   Date Value Ref Range Status   08/19/2021 233 (H) 70 - 110 mg/dL Final     BUN   Date Value Ref Range Status   08/19/2021 12 6 - 20 mg/dL Final     Creatinine   Date Value Ref Range Status   08/19/2021 1.0 0.5 - 1.4 mg/dL Final     Calcium   Date Value Ref Range Status   08/19/2021 9.6 8.7 - 10.5 mg/dL Final     Total Protein   Date Value Ref Range Status   01/13/2022 8.3 6.0 - 8.4 g/dL Final     Albumin   Date Value Ref Range Status   01/13/2022 3.5 3.5 - 5.2 g/dL Final     Total Bilirubin   Date Value Ref Range Status   01/13/2022 0.3 0.1 - 1.0 mg/dL Final     Comment:     For infants and newborns, interpretation of results should be based  on gestational age, weight and in agreement with clinical  observations.    Premature Infant recommended reference ranges:  Up to 24 hours.............<8.0 mg/dL  Up to 48 hours............<12.0 mg/dL  3-5 days..................<15.0 mg/dL  6-29 days.................<15.0 mg/dL       Alkaline Phosphatase   Date Value Ref Range Status   01/13/2022 142 (H) 55 - 135 U/L Final     AST   Date Value Ref Range Status   01/13/2022 28 10 - 40 U/L Final     ALT   Date Value Ref Range Status   01/13/2022 21 10 - 44 U/L Final     Anion Gap   Date Value Ref Range Status   08/19/2021 13 8 - 16 mmol/L Final     eGFR if    Date Value Ref Range Status   08/19/2021 >60 >60 mL/min/1.73 m^2 Final     eGFR if non    Date Value Ref Range Status   08/19/2021 >60 >60 mL/min/1.73 m^2 Final     Comment:     Calculation used to obtain the estimated glomerular filtration  rate (eGFR) is the CKD-EPI equation.        Lab Results   Component Value Date    HEPBSAG Negative 03/08/2020    HEPBSAB Negative 03/08/2020    HEPBCAB Negative 03/08/2020           MS Impression and Plan:     NEURO MULTIPLE SCLEROSIS IMPRESSION:   MS Status:     Number of relapses in the past year?:  0    Number of  relapses in the past year? comment:  No clear relapse, but she has clinical worsening.     Clinical Progression:  Worsened  Plan:     DMT:  No change in management    DMT comment:  Continue Aubagio and Vitamin D for now. We will check Aubagio safety labs today.     Symptom Management:  Implement change in symptom management    Implement Change in Symptom Management:  Pain (Oxcarbazepine increased to 600mg twice daily; oxcarbazepine blood level ordered )       She will plan to restart PT once kidney stones are resolved.   I will see her back virtually in 4 weeks, and she will see Dr. Zimmer in the late summer. If there is no clinical improvement after kidney stones, we will discuss restarting Ocrevus or perhaps consider Kesimpta. She is open to this.   She is interested in getting a new scooter for use outside the home. I will discuss with our .     Total time spent with patient: 37 minutes  Total time spent on encounter: 52 minutes    JANES Jiang, CNS     Problem List Items Addressed This Visit    None     Visit Diagnoses     Trigeminal neuralgia of left side of face    -  Primary    Relevant Medications    OXcarbazepine (TRILEPTAL) 600 MG Tab    Other Relevant Orders    Oxcarbazepine level

## 2022-04-07 NOTE — Clinical Note
Deb Guillen would like a new scooter for use outside the home. Her current one is 8-9 years old and came from  [facility]. Please discuss with patient and let me know if I need to do anything. Thanks!  Jackie

## 2022-05-02 ENCOUNTER — TELEPHONE (OUTPATIENT)
Dept: PSYCHIATRY | Facility: CLINIC | Age: 62
End: 2022-05-02
Payer: COMMERCIAL

## 2022-05-02 ENCOUNTER — PATIENT MESSAGE (OUTPATIENT)
Dept: NEUROLOGY | Facility: CLINIC | Age: 62
End: 2022-05-02
Payer: COMMERCIAL

## 2022-05-02 DIAGNOSIS — Z78.9 IMPAIRED MOBILITY AND ACTIVITIES OF DAILY LIVING: ICD-10-CM

## 2022-05-02 DIAGNOSIS — Z74.09 IMPAIRED MOBILITY AND ACTIVITIES OF DAILY LIVING: ICD-10-CM

## 2022-05-02 DIAGNOSIS — G35 MULTIPLE SCLEROSIS: Primary | ICD-10-CM

## 2022-05-02 NOTE — TELEPHONE ENCOUNTER
Received referral from JANES Segovia to assist with with need for scooter, primarily for outdoor use.  LVM for pt to call and sent [employer] message.

## 2022-05-05 NOTE — TELEPHONE ENCOUNTER
"Attempted to reach pt a second time to discuss her request for a scooter "primarily for outdoor use", per provider.  Pt was sleeping but her  shared that pt last received a scooter through insurance in 2012 through Dr. Moon, which CHRISTIANO confirmed through a review of the medical record.  CHRISTIANO agreed to discuss with JANES Segovia to see if she will serve as ordering provider or if she'd like pt evaluated through PMR.   "

## 2022-05-16 ENCOUNTER — OFFICE VISIT (OUTPATIENT)
Dept: NEUROLOGY | Facility: CLINIC | Age: 62
End: 2022-05-16
Payer: COMMERCIAL

## 2022-05-16 ENCOUNTER — PATIENT MESSAGE (OUTPATIENT)
Dept: NEUROLOGY | Facility: CLINIC | Age: 62
End: 2022-05-16

## 2022-05-16 DIAGNOSIS — R26.9 GAIT DISTURBANCE: ICD-10-CM

## 2022-05-16 DIAGNOSIS — G35 MULTIPLE SCLEROSIS: Primary | ICD-10-CM

## 2022-05-16 DIAGNOSIS — Z71.89 COUNSELING REGARDING GOALS OF CARE: ICD-10-CM

## 2022-05-16 DIAGNOSIS — Z78.9 IMPAIRED MOBILITY AND ADLS: ICD-10-CM

## 2022-05-16 DIAGNOSIS — Z29.89 PROPHYLACTIC IMMUNOTHERAPY: ICD-10-CM

## 2022-05-16 DIAGNOSIS — Z74.09 IMPAIRED MOBILITY AND ADLS: ICD-10-CM

## 2022-05-16 PROCEDURE — 99215 OFFICE O/P EST HI 40 MIN: CPT | Mod: 95,,, | Performed by: CLINICAL NURSE SPECIALIST

## 2022-05-16 PROCEDURE — 1159F PR MEDICATION LIST DOCUMENTED IN MEDICAL RECORD: ICD-10-PCS | Mod: CPTII,95,, | Performed by: CLINICAL NURSE SPECIALIST

## 2022-05-16 PROCEDURE — 1159F MED LIST DOCD IN RCRD: CPT | Mod: CPTII,95,, | Performed by: CLINICAL NURSE SPECIALIST

## 2022-05-16 PROCEDURE — 99215 PR OFFICE/OUTPT VISIT, EST, LEVL V, 40-54 MIN: ICD-10-PCS | Mod: 95,,, | Performed by: CLINICAL NURSE SPECIALIST
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
## 2022-05-16 NOTE — Clinical Note
We will likely start Kesimpta. I'm just waiting for her and her  to review literature and let me know if they want to proceed. If so, we can get labs and send them the form to sign. I also need to find out about accelerated elimination from [facility].

## 2022-05-18 ENCOUNTER — PATIENT MESSAGE (OUTPATIENT)
Dept: NEUROLOGY | Facility: CLINIC | Age: 62
End: 2022-05-18
Payer: COMMERCIAL

## 2022-05-18 ENCOUNTER — DOCUMENTATION ONLY (OUTPATIENT)
Dept: NEUROLOGY | Facility: CLINIC | Age: 62
End: 2022-05-18
Payer: COMMERCIAL

## 2022-05-24 ENCOUNTER — PATIENT MESSAGE (OUTPATIENT)
Dept: NEUROLOGY | Facility: CLINIC | Age: 62
End: 2022-05-24
Payer: COMMERCIAL

## 2022-05-24 DIAGNOSIS — G35 MULTIPLE SCLEROSIS: ICD-10-CM

## 2022-05-24 DIAGNOSIS — G50.0 TRIGEMINAL NEURALGIA: ICD-10-CM

## 2022-05-24 RX ORDER — OXCARBAZEPINE 300 MG/1
TABLET, FILM COATED ORAL
Qty: 60 TABLET | Refills: 2 | OUTPATIENT
Start: 2022-05-24

## 2022-05-26 NOTE — TELEPHONE ENCOUNTER
Phoned pt/ to follow up on order for miguelina.  LVM on home and cell phone.  Awaiting return call to discuss vendor options of [employer], [employer], and [employer].

## 2022-05-27 NOTE — TELEPHONE ENCOUNTER
Spoke with pt's  and then faxed order for scooter to Chandni Wilkinson [phone].  He is aware that this writer is on vacation next week, so if any problems arise they will be handled upon return to the office.

## 2022-06-01 ENCOUNTER — DOCUMENTATION ONLY (OUTPATIENT)
Dept: NEUROLOGY | Facility: CLINIC | Age: 62
End: 2022-06-01
Payer: COMMERCIAL

## 2022-06-02 NOTE — PROGRESS NOTES
[phone]

## 2022-06-10 ENCOUNTER — PATIENT MESSAGE (OUTPATIENT)
Dept: NEUROLOGY | Facility: CLINIC | Age: 62
End: 2022-06-10
Payer: COMMERCIAL

## 2022-06-17 ENCOUNTER — TELEPHONE (OUTPATIENT)
Dept: NEUROLOGY | Facility: CLINIC | Age: 62
End: 2022-06-17

## 2022-06-17 NOTE — TELEPHONE ENCOUNTER
Left message for Deb to call me back re: change to Kesimpta. I sent two messages via the portal, but it doesn't look like she has read these.

## 2022-06-24 ENCOUNTER — PATIENT MESSAGE (OUTPATIENT)
Dept: PSYCHIATRY | Facility: CLINIC | Age: 62
End: 2022-06-24
Payer: COMMERCIAL

## 2022-06-27 ENCOUNTER — HOSPITAL ENCOUNTER (OUTPATIENT)
Dept: RADIOLOGY | Facility: HOSPITAL | Age: 62
Discharge: HOME | End: 2022-06-27
Attending: CLINICAL NURSE SPECIALIST
Payer: COMMERCIAL

## 2022-06-27 ENCOUNTER — TELEPHONE (OUTPATIENT)
Dept: NEUROLOGY | Facility: CLINIC | Age: 62
End: 2022-06-27
Payer: COMMERCIAL

## 2022-06-27 DIAGNOSIS — E04.1 THYROID NODULE: Primary | ICD-10-CM

## 2022-06-27 DIAGNOSIS — G35 MULTIPLE SCLEROSIS: ICD-10-CM

## 2022-06-27 PROCEDURE — 76536 US SOFT TISSUE HEAD NECK THYROID: ICD-10-PCS | Mod: 26,,, | Performed by: RADIOLOGY

## 2022-06-27 PROCEDURE — 76536 US EXAM OF HEAD AND NECK: CPT | Mod: 26,,, | Performed by: RADIOLOGY

## 2022-06-27 PROCEDURE — 76536 US EXAM OF HEAD AND NECK: CPT | Mod: TC

## 2022-06-29 ENCOUNTER — PATIENT MESSAGE (OUTPATIENT)
Dept: NEUROLOGY | Facility: CLINIC | Age: 62
End: 2022-06-29
Payer: COMMERCIAL

## 2022-06-30 NOTE — TELEPHONE ENCOUNTER
Message addressed in separate encounter by AP. Pt schedule with [facility] 07/06/22 1:00PM with Dr. Rojelio Mccracken. Pt confirmed appt.

## 2022-07-06 ENCOUNTER — DOCUMENTATION ONLY (OUTPATIENT)
Dept: NEUROLOGY | Facility: CLINIC | Age: 62
End: 2022-07-06
Payer: COMMERCIAL

## 2022-07-06 ENCOUNTER — OFFICE VISIT (OUTPATIENT)
Dept: ENDOCRINOLOGY | Facility: CLINIC | Age: 62
End: 2022-07-06
Payer: COMMERCIAL

## 2022-07-06 VITALS
HEIGHT: 65 IN | HEART RATE: 79 BPM | OXYGEN SATURATION: 96 % | WEIGHT: 182.75 LBS | DIASTOLIC BLOOD PRESSURE: 86 MMHG | SYSTOLIC BLOOD PRESSURE: 132 MMHG | RESPIRATION RATE: 18 BRPM | BODY MASS INDEX: 30.45 KG/M2

## 2022-07-06 DIAGNOSIS — E04.2 MULTIPLE THYROID NODULES: Primary | ICD-10-CM

## 2022-07-06 DIAGNOSIS — E04.1 THYROID NODULE: ICD-10-CM

## 2022-07-06 DIAGNOSIS — F17.200 TOBACCO USE DISORDER: ICD-10-CM

## 2022-07-06 PROCEDURE — 3075F PR MOST RECENT SYSTOLIC BLOOD PRESS GE 130-139MM HG: ICD-10-PCS | Mod: CPTII,S$GLB,, | Performed by: INTERNAL MEDICINE

## 2022-07-06 PROCEDURE — 1159F MED LIST DOCD IN RCRD: CPT | Mod: CPTII,S$GLB,, | Performed by: INTERNAL MEDICINE

## 2022-07-06 PROCEDURE — 99999 PR PBB SHADOW E&M-EST. PATIENT-LVL V: CPT | Mod: PBBFAC,,, | Performed by: INTERNAL MEDICINE

## 2022-07-06 PROCEDURE — 3079F PR MOST RECENT DIASTOLIC BLOOD PRESSURE 80-89 MM HG: ICD-10-PCS | Mod: CPTII,S$GLB,, | Performed by: INTERNAL MEDICINE

## 2022-07-06 PROCEDURE — 3075F SYST BP GE 130 - 139MM HG: CPT | Mod: CPTII,S$GLB,, | Performed by: INTERNAL MEDICINE

## 2022-07-06 PROCEDURE — 3079F DIAST BP 80-89 MM HG: CPT | Mod: CPTII,S$GLB,, | Performed by: INTERNAL MEDICINE

## 2022-07-06 PROCEDURE — 3008F PR BODY MASS INDEX (BMI) DOCUMENTED: ICD-10-PCS | Mod: CPTII,S$GLB,, | Performed by: INTERNAL MEDICINE

## 2022-07-06 PROCEDURE — 3008F BODY MASS INDEX DOCD: CPT | Mod: CPTII,S$GLB,, | Performed by: INTERNAL MEDICINE

## 2022-07-06 PROCEDURE — 99204 PR OFFICE/OUTPT VISIT, NEW, LEVL IV, 45-59 MIN: ICD-10-PCS | Mod: S$GLB,,, | Performed by: INTERNAL MEDICINE

## 2022-07-06 PROCEDURE — 99999 PR PBB SHADOW E&M-EST. PATIENT-LVL V: ICD-10-PCS | Mod: PBBFAC,,, | Performed by: INTERNAL MEDICINE

## 2022-07-06 PROCEDURE — 1159F PR MEDICATION LIST DOCUMENTED IN MEDICAL RECORD: ICD-10-PCS | Mod: CPTII,S$GLB,, | Performed by: INTERNAL MEDICINE

## 2022-07-06 PROCEDURE — 99204 OFFICE O/P NEW MOD 45 MIN: CPT | Mod: S$GLB,,, | Performed by: INTERNAL MEDICINE

## 2022-07-06 NOTE — PROGRESS NOTES
NEW PATIENT VISIT    Subjective:      Chief Complaint: Thyroid Nodule      HPI: Deb Figueroa is a 61 y.o. female who is here for an initial evaluation for thyroid nodules      Past Medical History:   Diagnosis Date    Hypertension     MS (multiple sclerosis)     Nephrolithiasis        With regards to the thyroid nodule:    Thyroid nodules originally found on MRI cervical spine in 4/2022, and subsequently evaluated by ultrasound on 6/27/2022.    Last ultrasound in 6/27/22 was independently reviewed:      2.6 x 1.8 x 2.0 cm Right upper lobe nodule. The nodule is mostly solid with isoechoic echotexture and areas of macrocystic/microcystic degeneration. Vascularity is Grade 3 (penetrating). Borders are regular with a thin surrounding halo. Microcalcifications are not present.    0.9 x 0.8 x 0.9 cm Right upper lobe nodule. The nodule is solid with isoechoic echotexture. Vascularity is Grade 2 (peripheral). Borders are lobulated. Microcalcifications are not present.    Few other subcentimeter nodules noted in the report, which do not meet criteria for FNA.      No   Yes  [x]    [] Preexisting thyroid disease    Compressive Symptoms:  No  Yes  [x]    [] Anterior neck pressure  [x]    [] Dysphagia  [x]    [] Voice changes    Risk Factors:  No   Yes  [x]    [] Radiation to head or neck for any treatment of cancer or exposure to radiation  [x]    [] Personal history of colon or breast cancer  []    [x] Tobacco use  [x]    [] Family history of thyroid cancer      Previous biopsy results: None    Lab Results   Component Value Date    TSH 0.641 09/10/2020    TSH 1.065 03/07/2020           Reviewed past medical, family, social history and updated as appropriate.    Objective:     Vitals:    07/06/22 1319   BP: 132/86   Pulse: 79   Resp: 18         BP Readings from Last 5 Encounters:   07/06/22 132/86   04/07/22 129/84   04/05/21 127/87   09/10/20 122/72   03/09/20 (!) 147/87         Physical Exam  Vitals and nursing note  reviewed.   Constitutional:       General: She is not in acute distress.     Appearance: She is well-developed.   HENT:      Head: Normocephalic and atraumatic.   Eyes:      General:         Right eye: No discharge.         Left eye: No discharge.      Conjunctiva/sclera: Conjunctivae normal.   Neck:      Thyroid: Thyroid mass (RUL nodule) present. No thyromegaly or thyroid tenderness.      Trachea: No tracheal deviation.   Cardiovascular:      Rate and Rhythm: Normal rate.   Pulmonary:      Effort: Pulmonary effort is normal. No respiratory distress.   Musculoskeletal:      Cervical back: Normal range of motion. No tenderness.      Comments: No digital clubbing or extremity cyanosis   Neurological:      Mental Status: She is alert and oriented to person, place, and time.      Coordination: Coordination normal.   Psychiatric:         Behavior: Behavior normal.           Wt Readings from Last 30 Encounters:   07/06/22 1319 82.9 kg (182 lb 12.2 oz)   04/07/22 1257 82.9 kg (182 lb 10.4 oz)   04/05/21 1144 66.4 kg (146 lb 7.9 oz)   09/10/20 1606 56.7 kg (125 lb)   03/06/20 0414 68 kg (150 lb)   03/06/20 0134 68 kg (150 lb)   03/05/20 2148 68 kg (150 lb)   02/12/19 0900 74 kg (163 lb 2.3 oz)   01/28/19 1302 71.7 kg (158 lb)   09/07/18 1438 71.7 kg (158 lb)   09/04/18 0450 72.1 kg (158 lb 15.2 oz)   09/03/18 0203 72.7 kg (160 lb 4.4 oz)   09/02/18 2259 74.8 kg (165 lb)   08/28/18 0800 74.2 kg (163 lb 9.3 oz)   08/14/18 0700 71.2 kg (156 lb 15.5 oz)   07/18/18 1118 71.2 kg (156 lb 15.5 oz)   03/12/18 1618 65.8 kg (145 lb)   04/12/17 1611 65.8 kg (145 lb)   11/28/16 1118 72.6 kg (160 lb 1.6 oz)   10/14/16 1550 70.1 kg (154 lb 8.7 oz)   01/06/16 1507 62.6 kg (138 lb)   09/22/15 1448 65.3 kg (144 lb)   09/22/15 1156 65.8 kg (145 lb)   07/28/15 1317 65.8 kg (145 lb)   07/28/15 1219 65.8 kg (145 lb)   06/24/15 1436 63.5 kg (140 lb)   06/24/15 1148 63.5 kg (140 lb)   06/24/15 1144 63.5 kg (140 lb)   01/07/15 1106 63.5 kg (140  lb)   10/29/14 1153 63.5 kg (140 lb)   07/08/14 1206 64.4 kg (142 lb)   06/16/14 1538 59 kg (130 lb)   03/17/14 1231 61.2 kg (135 lb)       Lab Results   Component Value Date    HGBA1C 5.7 (H) 03/07/2020     No results found for: CHOL, HDL, LDLCALC, TRIG, CHOLHDL  Lab Results   Component Value Date     08/19/2021    K 3.3 (L) 08/19/2021     08/19/2021    CO2 22 (L) 08/19/2021     (H) 08/19/2021    BUN 12 08/19/2021    CREATININE 1.0 08/19/2021    CALCIUM 9.6 08/19/2021    PROT 8.3 04/07/2022    ALBUMIN 3.8 04/07/2022    BILITOT 0.2 04/07/2022    ALKPHOS 131 04/07/2022    AST 20 04/07/2022    ALT 8 (L) 04/07/2022    ANIONGAP 13 08/19/2021    ESTGFRAFRICA >60 08/19/2021    EGFRNONAA >60 08/19/2021    TSH 0.641 09/10/2020      No results found for: MICALBCREAT    Assessment/Plan:     Tobacco use disorder  Risk factor for thyroid cancer. She is not interested in cessation at the moment.    Multiple thyroid nodules  I have reviewed management options including observation or FNA.  All of the patients questions were answered.     Discussed indications for a FNA  Discussed possible FNA results (benign, FLUS,  follicular or hurthle lesion, suspicious for cancer, cancer and non diagnostic)     Reviewed that a non diagnostic or FLUS would require a repeat FNA or molecular marker testing.    Will proceed with FNA of 0.9 cm and 2.6 cm right superior thyroid nodules    Discussed indications for repeat FNA as well as surgical indications (abnormal FNA, compressive symptoms or interval change)     If FNA is negative then plan follow up in 1 year with TSH and thyroid u/s prior          Follow up in about 1 year (around 7/6/2023).

## 2022-07-06 NOTE — ASSESSMENT & PLAN NOTE
I have reviewed management options including observation or FNA.  All of the patients questions were answered.     Discussed indications for a FNA  Discussed possible FNA results (benign, FLUS,  follicular or hurthle lesion, suspicious for cancer, cancer and non diagnostic)     Reviewed that a non diagnostic or FLUS would require a repeat FNA or molecular marker testing.    Will proceed with FNA of 0.9 cm and 2.6 cm right superior thyroid nodules    Discussed indications for repeat FNA as well as surgical indications (abnormal FNA, compressive symptoms or interval change)     If FNA is negative then plan follow up in 1 year with TSH and thyroid u/s prior

## 2022-07-18 ENCOUNTER — DOCUMENTATION ONLY (OUTPATIENT)
Dept: NEUROLOGY | Facility: CLINIC | Age: 62
End: 2022-07-18
Payer: COMMERCIAL

## 2022-07-19 ENCOUNTER — SPECIALTY PHARMACY (OUTPATIENT)
Dept: PHARMACY | Facility: CLINIC | Age: 62
End: 2022-07-19
Payer: COMMERCIAL

## 2022-07-20 NOTE — TELEPHONE ENCOUNTER
Per plan, PA for Kesimpta is required. However, the patient needs to complete their enrollment link with the required dispensing pharmacy ([facility]) before the PA questions are sent to the office. Rep states questions will be faxed. Will continue to follow.

## 2022-07-24 ENCOUNTER — PATIENT MESSAGE (OUTPATIENT)
Dept: NEUROLOGY | Facility: CLINIC | Age: 62
End: 2022-07-24
Payer: COMMERCIAL

## 2022-07-27 NOTE — TELEPHONE ENCOUNTER
Lia JOSEPH approved 7/26/22 to 7/25/23  Ticket Number: [id number]    Patient must fill with [facility]. Scripts routed. Patient aware. Closing referral at [facility].

## 2022-07-28 ENCOUNTER — TELEPHONE (OUTPATIENT)
Dept: NEUROLOGY | Facility: CLINIC | Age: 62
End: 2022-07-28
Payer: COMMERCIAL

## 2022-07-28 NOTE — TELEPHONE ENCOUNTER
----- Message from Ann Pham sent at 7/28/2022 11:00 AM CDT -----  Regarding: PHARM WANTS TO VERIFY DIRECTONS  Contact: pharm  ofatumumab (KESIMPTA PEN) 20 mg/0.4 mL Fela  Pharm calling in reference to getting clarification on meds above..     Confirmed contact info below:  Contact Name: Deb Figueroa  Phone Number: [phone]      [facility] press 0 twice and ask from [facility]  [phone]

## 2022-08-05 ENCOUNTER — DOCUMENTATION ONLY (OUTPATIENT)
Dept: NEUROLOGY | Facility: CLINIC | Age: 62
End: 2022-08-05
Payer: COMMERCIAL

## 2022-08-08 ENCOUNTER — OFFICE VISIT (OUTPATIENT)
Dept: NEUROLOGY | Facility: CLINIC | Age: 62
End: 2022-08-08
Payer: COMMERCIAL

## 2022-08-08 VITALS
BODY MASS INDEX: 24.4 KG/M2 | WEIGHT: 146.44 LBS | HEART RATE: 84 BPM | DIASTOLIC BLOOD PRESSURE: 83 MMHG | HEIGHT: 65 IN | SYSTOLIC BLOOD PRESSURE: 137 MMHG

## 2022-08-08 DIAGNOSIS — R26.9 GAIT DISTURBANCE: ICD-10-CM

## 2022-08-08 DIAGNOSIS — E55.9 VITAMIN D DEFICIENCY: ICD-10-CM

## 2022-08-08 DIAGNOSIS — Z78.9 IMPAIRED MOBILITY AND ADLS: ICD-10-CM

## 2022-08-08 DIAGNOSIS — Z74.09 IMPAIRED MOBILITY AND ADLS: ICD-10-CM

## 2022-08-08 DIAGNOSIS — Z71.89 COUNSELING REGARDING GOALS OF CARE: ICD-10-CM

## 2022-08-08 DIAGNOSIS — N31.9 NEUROGENIC DYSFUNCTION OF THE URINARY BLADDER: ICD-10-CM

## 2022-08-08 DIAGNOSIS — D84.9 IMMUNOSUPPRESSION: ICD-10-CM

## 2022-08-08 DIAGNOSIS — G35 MS (MULTIPLE SCLEROSIS): Primary | ICD-10-CM

## 2022-08-08 PROCEDURE — 99999 PR PBB SHADOW E&M-EST. PATIENT-LVL IV: ICD-10-PCS | Mod: PBBFAC,,, | Performed by: PSYCHIATRY & NEUROLOGY

## 2022-08-08 PROCEDURE — 99215 OFFICE O/P EST HI 40 MIN: CPT | Mod: S$GLB,,, | Performed by: PSYCHIATRY & NEUROLOGY

## 2022-08-08 PROCEDURE — 3075F SYST BP GE 130 - 139MM HG: CPT | Mod: CPTII,S$GLB,, | Performed by: PSYCHIATRY & NEUROLOGY

## 2022-08-08 PROCEDURE — 3008F PR BODY MASS INDEX (BMI) DOCUMENTED: ICD-10-PCS | Mod: CPTII,S$GLB,, | Performed by: PSYCHIATRY & NEUROLOGY

## 2022-08-08 PROCEDURE — 3079F DIAST BP 80-89 MM HG: CPT | Mod: CPTII,S$GLB,, | Performed by: PSYCHIATRY & NEUROLOGY

## 2022-08-08 PROCEDURE — 3008F BODY MASS INDEX DOCD: CPT | Mod: CPTII,S$GLB,, | Performed by: PSYCHIATRY & NEUROLOGY

## 2022-08-08 PROCEDURE — 99215 PR OFFICE/OUTPT VISIT, EST, LEVL V, 40-54 MIN: ICD-10-PCS | Mod: S$GLB,,, | Performed by: PSYCHIATRY & NEUROLOGY

## 2022-08-08 PROCEDURE — 3079F PR MOST RECENT DIASTOLIC BLOOD PRESSURE 80-89 MM HG: ICD-10-PCS | Mod: CPTII,S$GLB,, | Performed by: PSYCHIATRY & NEUROLOGY

## 2022-08-08 PROCEDURE — 1160F RVW MEDS BY RX/DR IN RCRD: CPT | Mod: CPTII,S$GLB,, | Performed by: PSYCHIATRY & NEUROLOGY

## 2022-08-08 PROCEDURE — 1160F PR REVIEW ALL MEDS BY PRESCRIBER/CLIN PHARMACIST DOCUMENTED: ICD-10-PCS | Mod: CPTII,S$GLB,, | Performed by: PSYCHIATRY & NEUROLOGY

## 2022-08-08 PROCEDURE — 99999 PR PBB SHADOW E&M-EST. PATIENT-LVL IV: CPT | Mod: PBBFAC,,, | Performed by: PSYCHIATRY & NEUROLOGY

## 2022-08-08 PROCEDURE — 1159F MED LIST DOCD IN RCRD: CPT | Mod: CPTII,S$GLB,, | Performed by: PSYCHIATRY & NEUROLOGY

## 2022-08-08 PROCEDURE — 1159F PR MEDICATION LIST DOCUMENTED IN MEDICAL RECORD: ICD-10-PCS | Mod: CPTII,S$GLB,, | Performed by: PSYCHIATRY & NEUROLOGY

## 2022-08-08 PROCEDURE — 3075F PR MOST RECENT SYSTOLIC BLOOD PRESS GE 130-139MM HG: ICD-10-PCS | Mod: CPTII,S$GLB,, | Performed by: PSYCHIATRY & NEUROLOGY

## 2022-08-08 RX ORDER — ASPIRIN 325 MG
50000 TABLET, DELAYED RELEASE (ENTERIC COATED) ORAL
Qty: 12 CAPSULE | Refills: 3 | Status: SHIPPED | OUTPATIENT
Start: 2022-08-08

## 2022-08-08 NOTE — PROGRESS NOTES
Subjective:          Patient ID: Deb Figueroa is a 61 y.o. female who presents today for a routine clinic visit for MS.      MS HPI:  · DMT: Other starting Kesimpta   · Side effects from DMT? No  · Taking vitamin D3 as recommended? Yes -     · Just started Kesimpta --has only had 2 doses;   · Her  reports that she seems to have a little more energy since starting Kesimpta;   · Going to [facility] --her  feels it is making a difference;    · Transferring independently;    · They lost their house in [place] last year.  Living in a apartment currently --about the buy a new house  · Later this month, Chandni is coming to the home for wheelchair evaluation;   · On Ativan 2mg hs   · They have not been taking mirabegron.  She wears a diaper;    · Oxcarbazepine 600mg BID has made a huge difference;   · Vaxxed x 3    Medications:  Current Outpatient Medications   Medication Instructions    amLODIPine (NORVASC) 5 mg, Oral, Daily    buPROPion (WELLBUTRIN XL) 150 MG TB24 tablet TAKE ONE TABLET BY MOUTH ONCE A DAY    chlorhexidine (PERIDEX) 0.12 % solution SMARTSI Capful(s) By Mouth Twice Daily    cholecalciferol (vitamin D3) 50,000 Units, Oral, Every 7 days    EScitalopram oxalate (LEXAPRO) 20 mg, Oral, Nightly    KESIMPTA PEN 20 mg, Subcutaneous, Every 28 days    LORazepam (ATIVAN) 2 mg, Oral, 3 times daily PRN    ofatumumab (KESIMPTA PEN) 20 mg/0.4 mL PnIj Loading Dose: Inject 20 mg (0.4 ml) SQ at week 0, 1 and 2.    OXcarbazepine (TRILEPTAL) 600 mg, Oral, 2 times daily    rosuvastatin (CRESTOR) 10 mg, Oral, Nightly       SOCIAL HISTORY  Social History     Tobacco Use    Smoking status: Current Every Day Smoker    Smokeless tobacco: Never Used   Substance Use Topics    Alcohol use: No    Drug use: No       Living arrangements - the patient lives with their spouse.    REVIEW OF SYMPTOMS 2022   Do you feel abnormally tired on most days? Yes   Do you feel you generally sleep well? Yes   Do you  have difficulty controlling your bladder?  Yes   Do you have difficulty controlling your bowels?  Yes   Do you have frequent muscle cramps, tightness or spasms in your limbs?  No   Do you have new visual symptoms?  Yes   Do you have worsening difficulty with your memory or thinking? Yes   Do you have worsening symptoms of anxiety or depression?  Yes   For patients who walk, Do you have more difficulty walking?  Not Applicable   Have you fallen since your last visit?  Yes   For patients who use wheelchairs: Do you have any skin wounds or breakdown? No   Do you have difficulty using your hands?  Yes   Do you have shooting or burning pain? Yes   Do you have difficulty with sexual function?  Yes   If you are sexually active, are you using birth control? Y/N  N/A Not Applicable   Do you often choke when swallowing liquids or solid food?  No   Do you experience worsening symptoms when overheated? Yes   Do you need any new equipment such as a wheelchair, walker or shower chair? Yes   Do you receive co-pay financial assistance for your principal MS medicine? Yes   Would you be interested in participating in an MS research trial in the future? Yes   For patients on Gilenya, Tecfidera, Aubagio, Rituxan, Ocrevus, Tysabri, Lemtrada or Methotrexate, are you aware that you should NOT receive live virus vaccines?  Yes   Do you feel you have adequate family/friend support?  Yes   Do you have health insurance?   Yes   Are you currently employed? No   Do you receive SSDI/SSI?  No   Do you use marijuana or cannabis products? No   Have you been diagnosed with a urinary tract infection since your last visit here? Yes   Have you been diagnosed with a respiratory tract infection since your last visit here? No   Have you been to the emergency room since your last visit here? Yes   Have you been hospitalized since your last visit here?  No                Objective:          Neurologic Exam    In [facility]  MS: expansive affect; follows all  verbal command;  CN: no EVAN, no dysarthria  MOTOR; RHF 1/5, RKF 3/5, RDF 4/5  LHF 2/5, LKF 3/5, LDF 4/5  SENS: moderate decrease in vib RLE  GAIT: unable    Imaging:     Results for orders placed during the hospital encounter of 04/07/22    MRI Brain Demyelinating W W/O Contrast    Impression  Findings in the cerebral white matter and cervical/thoracic cord which are typical for multiple sclerosis inter similar in number and distribution compared to prior exam.  No new or enhancing lesions to suggest active disease.    Nonspecific right thyroid nodule measuring 21 mm mildly increased in size but noted dating back to 2015.    Multiple prominent parapelvic renal cysts again identified.  Hydronephrosis on the left is difficult to exclude CT imaging to be considered if clinically warranted.    Electronically signed by resident: Angelika Ding  Date:    04/07/2022  Time:    10:43    Electronically signed by: Robin Pacheco  Date:    04/07/2022  Time:    15:37    Results for orders placed during the hospital encounter of 04/07/22    MRI Cervical Spine Demyelinating W W/O Contrast    Impression  Findings in the cerebral white matter and cervical/thoracic cord which are typical for multiple sclerosis inter similar in number and distribution compared to prior exam.  No new or enhancing lesions to suggest active disease.    Nonspecific right thyroid nodule measuring 21 mm mildly increased in size but noted dating back to 2015.    Multiple prominent parapelvic renal cysts again identified.  Hydronephrosis on the left is difficult to exclude CT imaging to be considered if clinically warranted.    Electronically signed by resident: Angelika Ding  Date:    04/07/2022  Time:    10:43    Electronically signed by: Robin Pacheco  Date:    04/07/2022  Time:    15:37    Results for orders placed during the hospital encounter of 04/07/22    MRI Thoracic Spine Demyelinating W W/O Contrast    Impression  Findings in the cerebral white  matter and cervical/thoracic cord which are typical for multiple sclerosis inter similar in number and distribution compared to prior exam.  No new or enhancing lesions to suggest active disease.    Nonspecific right thyroid nodule measuring 21 mm mildly increased in size but noted dating back to 2015.    Multiple prominent parapelvic renal cysts again identified.  Hydronephrosis on the left is difficult to exclude CT imaging to be considered if clinically warranted.    Electronically signed by resident: Angelika Ding  Date:    04/07/2022  Time:    10:43    Electronically signed by: Robin Pacheco  Date:    04/07/2022  Time:    15:37        Labs:     Lab Results   Component Value Date    OYIKRFBP65HS 19 (L) 06/27/2022    SQBLOZRT98NY 20 (L) 03/06/2020    VZGGOAWA39BC 7 (L) 07/18/2018     Lab Results   Component Value Date    JCVINDEX 0.17 07/18/2018    JCVANTIBODY Negative 07/18/2018     Lab Results   Component Value Date    CX5PKAOQ 81.7 11/28/2016    ABSOLUTECD3 2375 (H) 11/28/2016    QY6IILWP 23.7 11/28/2016    ABSOLUTECD8 689 11/28/2016    WT5QBBQI 58.7 (H) 11/28/2016    ABSOLUTECD4 1707 (H) 11/28/2016    LABCD48 2.48 11/28/2016     Lab Results   Component Value Date    WBC 7.18 06/27/2022    RBC 5.16 06/27/2022    HGB 13.8 06/27/2022    HCT 43.3 06/27/2022    MCV 84 06/27/2022    MCH 26.7 (L) 06/27/2022    MCHC 31.9 (L) 06/27/2022    RDW 14.3 06/27/2022     06/27/2022    MPV 11.2 06/27/2022    GRAN 4.9 06/27/2022    GRAN 68.3 06/27/2022    LYMPH 1.6 06/27/2022    LYMPH 22.6 06/27/2022    MONO 0.4 06/27/2022    MONO 5.4 06/27/2022    EOS 0.2 06/27/2022    BASO 0.06 06/27/2022    EOSINOPHIL 2.8 06/27/2022    BASOPHIL 0.8 06/27/2022     Sodium   Date Value Ref Range Status   08/19/2021 139 136 - 145 mmol/L Final     Potassium   Date Value Ref Range Status   08/19/2021 3.3 (L) 3.5 - 5.1 mmol/L Final     Chloride   Date Value Ref Range Status   08/19/2021 104 95 - 110 mmol/L Final     CO2   Date Value Ref  Range Status   08/19/2021 22 (L) 23 - 29 mmol/L Final     Glucose   Date Value Ref Range Status   08/19/2021 233 (H) 70 - 110 mg/dL Final     BUN   Date Value Ref Range Status   08/19/2021 12 6 - 20 mg/dL Final     Creatinine   Date Value Ref Range Status   08/19/2021 1.0 0.5 - 1.4 mg/dL Final     Calcium   Date Value Ref Range Status   08/19/2021 9.6 8.7 - 10.5 mg/dL Final     Total Protein   Date Value Ref Range Status   04/07/2022 8.3 6.0 - 8.4 g/dL Final     Albumin   Date Value Ref Range Status   04/07/2022 3.8 3.5 - 5.2 g/dL Final     Total Bilirubin   Date Value Ref Range Status   04/07/2022 0.2 0.1 - 1.0 mg/dL Final     Comment:     For infants and newborns, interpretation of results should be based  on gestational age, weight and in agreement with clinical  observations.    Premature Infant recommended reference ranges:  Up to 24 hours.............<8.0 mg/dL  Up to 48 hours............<12.0 mg/dL  3-5 days..................<15.0 mg/dL  6-29 days.................<15.0 mg/dL       Alkaline Phosphatase   Date Value Ref Range Status   04/07/2022 131 55 - 135 U/L Final     AST   Date Value Ref Range Status   04/07/2022 20 10 - 40 U/L Final     ALT   Date Value Ref Range Status   04/07/2022 8 (L) 10 - 44 U/L Final     Anion Gap   Date Value Ref Range Status   08/19/2021 13 8 - 16 mmol/L Final     eGFR if    Date Value Ref Range Status   08/19/2021 >60 >60 mL/min/1.73 m^2 Final     eGFR if non    Date Value Ref Range Status   08/19/2021 >60 >60 mL/min/1.73 m^2 Final     Comment:     Calculation used to obtain the estimated glomerular filtration  rate (eGFR) is the CKD-EPI equation.        Lab Results   Component Value Date    HEPBSAG Negative 06/27/2022    HEPBSAB Negative 06/27/2022    HEPBCAB Negative 06/27/2022           MS Impression and Plan:     NEURO MULTIPLE SCLEROSIS IMPRESSION:   MS Status:     Clinical Progression:  Clinically Stable    MRI Progression:  N/A  Plan:      DMT:  No change in management    DMT comment:  Tolerating Kesimpta; She is in middle of loading doses; advised 4th vaccine and Evusheld    Symptom Management:  Implement change in symptom management    Implement Change in Symptom Management:  Gait (PT ordered)     Next Imaging Due: 2/8/2023     Next Labs Due: 2/7/2023     Change vit D to prescription form   MRI brain / labs in February   Continue PT [facility]  [facility]          Problem List Items Addressed This Visit        Unprioritized    Immunosuppression    Relevant Orders    Ambulatory referral/consult Order for Evusheld    CBC Auto Differential    Hepatitis B Core Antibody, Total    Hepatitis B Surface Ab, Qualitative    Hepatitis B Surface Antigen    Immunoglobulins (IgG, IgA, IgM) Quantitative    MS (multiple sclerosis) - Primary    Relevant Orders    Ambulatory referral/consult to Physical/Occupational Therapy    Ambulatory referral/consult Order for Evusheld    MRI Brain Demyelinating W W/O Contrast    CBC Auto Differential    Hepatitis B Core Antibody, Total    Hepatitis B Surface Ab, Qualitative    Hepatitis B Surface Antigen    Immunoglobulins (IgG, IgA, IgM) Quantitative    Gait disturbance    Relevant Orders    Ambulatory referral/consult to Physical/Occupational Therapy      Other Visit Diagnoses     Vitamin D deficiency        Relevant Medications    cholecalciferol, vitamin D3, 1,250 mcg (50,000 unit) capsule    Other Relevant Orders    Vitamin D    Neurogenic dysfunction of the urinary bladder        Counseling regarding goals of care        Impaired mobility and ADLs              Meghana Zimmer MD    I spent a total of 40 minutes on the day of the visit.This includes face to face time and non-face to face time preparing to see the patient (eg, review of tests), obtaining and/or reviewing separately obtained history, documenting clinical information in the electronic or other health record, independently  interpreting results and communicating results to the patient/family/caregiver, or care coordinator.

## 2022-08-23 ENCOUNTER — TELEPHONE (OUTPATIENT)
Dept: NEUROLOGY | Facility: CLINIC | Age: 62
End: 2022-08-23
Payer: COMMERCIAL

## 2022-08-23 NOTE — TELEPHONE ENCOUNTER
----- Message from Luz Uriarte sent at 8/23/2022  9:34 AM CDT -----  Regarding: Prescription for Scooter  Contact: Star @ [phone]  Star from [facility] is calling to get prescription for motorized scooter faxed to office @ [phone]. Pt is coming to PT for 1pm, need prescription faxed before 1pm. Asking to be faxed asap

## 2022-08-29 ENCOUNTER — TELEPHONE (OUTPATIENT)
Dept: PSYCHIATRY | Facility: CLINIC | Age: 62
End: 2022-08-29
Payer: COMMERCIAL

## 2022-08-29 NOTE — TELEPHONE ENCOUNTER
Phoned pt to inform her that we received the order request for her motorized scooter appointment too late in the day, and inquired as to whether her needs had been met.

## 2022-08-30 ENCOUNTER — TELEPHONE (OUTPATIENT)
Dept: NEUROLOGY | Facility: CLINIC | Age: 62
End: 2022-08-30
Payer: COMMERCIAL

## 2022-08-30 ENCOUNTER — DOCUMENTATION ONLY (OUTPATIENT)
Dept: NEUROLOGY | Facility: CLINIC | Age: 62
End: 2022-08-30
Payer: COMMERCIAL

## 2022-08-30 NOTE — TELEPHONE ENCOUNTER
----- Message from Meera Zamorano sent at 8/30/2022  2:37 PM CDT -----  Regarding: order  Contact: Hardy @ [facility] [phone]  Star calling second attempt made, needs order for pt miguelina. Order is in chart but Hardy needs a copy. Pls call and fax @[phone]

## 2022-09-01 ENCOUNTER — HOSPITAL ENCOUNTER (OUTPATIENT)
Dept: ENDOCRINOLOGY | Facility: CLINIC | Age: 62
Discharge: HOME | End: 2022-09-01
Attending: INTERNAL MEDICINE
Payer: COMMERCIAL

## 2022-09-01 ENCOUNTER — DOCUMENTATION ONLY (OUTPATIENT)
Dept: NEUROLOGY | Facility: CLINIC | Age: 62
End: 2022-09-01
Payer: COMMERCIAL

## 2022-09-01 DIAGNOSIS — E04.2 MULTIPLE THYROID NODULES: ICD-10-CM

## 2022-09-01 PROCEDURE — 10005 FNA BX W/US GDN 1ST LES: CPT | Mod: S$GLB,,, | Performed by: INTERNAL MEDICINE

## 2022-09-01 PROCEDURE — 10005 US FINE NEEDLE ASPIRATION BIOPSY, FIRST LESION: ICD-10-PCS | Mod: S$GLB,,, | Performed by: INTERNAL MEDICINE

## 2022-09-01 PROCEDURE — 10006 US FINE NEEDLE ASPIRATION BIOPSY , ADDL LESION: ICD-10-PCS | Mod: S$GLB,,, | Performed by: INTERNAL MEDICINE

## 2022-09-01 PROCEDURE — 88173 PR  INTERPRETATION OF FNA SMEAR: ICD-10-PCS | Mod: 26,,, | Performed by: PATHOLOGY

## 2022-09-01 PROCEDURE — 88173 CYTOPATH EVAL FNA REPORT: CPT | Performed by: PATHOLOGY

## 2022-09-01 PROCEDURE — 88173 CYTOPATH EVAL FNA REPORT: CPT | Mod: 26,,, | Performed by: PATHOLOGY

## 2022-09-01 PROCEDURE — 10006 FNA BX W/US GDN EA ADDL: CPT | Mod: S$GLB,,, | Performed by: INTERNAL MEDICINE

## 2022-09-02 ENCOUNTER — PATIENT MESSAGE (OUTPATIENT)
Dept: ENDOCRINOLOGY | Facility: CLINIC | Age: 62
End: 2022-09-02
Payer: COMMERCIAL

## 2022-09-02 DIAGNOSIS — E04.1 THYROID NODULE: Primary | ICD-10-CM

## 2022-09-02 LAB
FINAL PATHOLOGIC DIAGNOSIS: ABNORMAL
FINAL PATHOLOGIC DIAGNOSIS: NORMAL
Lab: ABNORMAL
Lab: NORMAL

## 2022-09-07 ENCOUNTER — TELEPHONE (OUTPATIENT)
Dept: PSYCHIATRY | Facility: CLINIC | Age: 62
End: 2022-09-07
Payer: COMMERCIAL

## 2022-09-07 NOTE — TELEPHONE ENCOUNTER
CHRISTIANO spoke with Hardy from [facility] and explained that our intern had tried contacting pt regarding her request, but without success.  Hardy has the order that she needed from the vendor, so the problem is solved.

## 2022-09-27 ENCOUNTER — DOCUMENTATION ONLY (OUTPATIENT)
Dept: NEUROLOGY | Facility: CLINIC | Age: 62
End: 2022-09-27
Payer: COMMERCIAL

## 2022-10-05 ENCOUNTER — PATIENT MESSAGE (OUTPATIENT)
Dept: NEUROLOGY | Facility: CLINIC | Age: 62
End: 2022-10-05
Payer: COMMERCIAL

## 2022-10-10 ENCOUNTER — TELEPHONE (OUTPATIENT)
Dept: NEUROLOGY | Facility: CLINIC | Age: 62
End: 2022-10-10
Payer: COMMERCIAL

## 2022-10-10 NOTE — TELEPHONE ENCOUNTER
Spoke to pt's . I offered him AP's soonest in person appt dates, but he said he will wait to see if she needs to see us after discussing pt's issues with family doc. He told me to cancel the appt gregg and he will call us back if they want to reschedule.

## 2022-10-10 NOTE — TELEPHONE ENCOUNTER
----- Message from Blanca Carmona RN sent at 10/10/2022 10:45 AM CDT -----  Contact: Pt spouse    ----- Message -----  From: Mary Florez  Sent: 10/10/2022   9:03 AM CDT  To: Petar CALDERÓN Staff    Pt would like to reschedule  visit on tomorrow. Pt would like to be seen in person and not virtual. Pt would like a call back.     Confirmed contact below:   Contact Name:Deb Figueroa  Phone Number: [phone]

## 2022-10-13 ENCOUNTER — DOCUMENTATION ONLY (OUTPATIENT)
Dept: NEUROLOGY | Facility: CLINIC | Age: 62
End: 2022-10-13
Payer: COMMERCIAL

## 2022-11-10 ENCOUNTER — HOSPITAL ENCOUNTER (OUTPATIENT)
Dept: ENDOCRINOLOGY | Facility: CLINIC | Age: 62
Discharge: HOME | End: 2022-11-10
Attending: INTERNAL MEDICINE
Payer: COMMERCIAL

## 2022-11-10 DIAGNOSIS — E04.1 THYROID NODULE: ICD-10-CM

## 2022-11-10 DIAGNOSIS — E04.2 MULTIPLE THYROID NODULES: Primary | ICD-10-CM

## 2022-11-10 PROCEDURE — 10005 FNA BX W/US GDN 1ST LES: CPT | Mod: S$GLB,,, | Performed by: INTERNAL MEDICINE

## 2022-11-10 PROCEDURE — 88173 CYTOPATH EVAL FNA REPORT: CPT | Performed by: PATHOLOGY

## 2022-11-10 PROCEDURE — 88173 PR  INTERPRETATION OF FNA SMEAR: ICD-10-PCS | Mod: 26,,, | Performed by: PATHOLOGY

## 2022-11-10 PROCEDURE — 88173 CYTOPATH EVAL FNA REPORT: CPT | Mod: 26,,, | Performed by: PATHOLOGY

## 2022-11-10 PROCEDURE — 10005 US FINE NEEDLE ASPIRATION THYROID, FIRST LESION: ICD-10-PCS | Mod: S$GLB,,, | Performed by: INTERNAL MEDICINE

## 2022-11-16 LAB
FINAL PATHOLOGIC DIAGNOSIS: NORMAL
Lab: NORMAL

## 2022-11-17 ENCOUNTER — PATIENT MESSAGE (OUTPATIENT)
Dept: ENDOCRINOLOGY | Facility: CLINIC | Age: 62
End: 2022-11-17
Payer: COMMERCIAL

## 2022-12-01 ENCOUNTER — PATIENT MESSAGE (OUTPATIENT)
Dept: PSYCHIATRY | Facility: CLINIC | Age: 62
End: 2022-12-01
Payer: COMMERCIAL

## 2023-01-19 ENCOUNTER — PATIENT MESSAGE (OUTPATIENT)
Dept: NEUROLOGY | Facility: CLINIC | Age: 63
End: 2023-01-19
Payer: COMMERCIAL

## 2023-02-20 ENCOUNTER — PATIENT MESSAGE (OUTPATIENT)
Dept: PSYCHIATRY | Facility: CLINIC | Age: 63
End: 2023-02-20
Payer: COMMERCIAL

## 2023-03-30 ENCOUNTER — OFFICE VISIT (OUTPATIENT)
Dept: NEUROLOGY | Facility: CLINIC | Age: 63
End: 2023-03-30
Payer: COMMERCIAL

## 2023-03-30 ENCOUNTER — HOSPITAL ENCOUNTER (OUTPATIENT)
Dept: RADIOLOGY | Facility: HOSPITAL | Age: 63
Discharge: HOME | End: 2023-03-30
Attending: PSYCHIATRY & NEUROLOGY
Payer: COMMERCIAL

## 2023-03-30 VITALS
HEIGHT: 65 IN | HEART RATE: 77 BPM | SYSTOLIC BLOOD PRESSURE: 133 MMHG | DIASTOLIC BLOOD PRESSURE: 79 MMHG | BODY MASS INDEX: 23.47 KG/M2 | WEIGHT: 140.88 LBS

## 2023-03-30 DIAGNOSIS — Z79.899 HIGH RISK MEDICATION USE: ICD-10-CM

## 2023-03-30 DIAGNOSIS — G35 MULTIPLE SCLEROSIS: Primary | ICD-10-CM

## 2023-03-30 DIAGNOSIS — G50.0 TRIGEMINAL NEURALGIA OF LEFT SIDE OF FACE: ICD-10-CM

## 2023-03-30 DIAGNOSIS — G35 MS (MULTIPLE SCLEROSIS): ICD-10-CM

## 2023-03-30 DIAGNOSIS — Z29.89 PROPHYLACTIC IMMUNOTHERAPY: ICD-10-CM

## 2023-03-30 DIAGNOSIS — Z71.89 COUNSELING REGARDING GOALS OF CARE: ICD-10-CM

## 2023-03-30 PROCEDURE — 3008F PR BODY MASS INDEX (BMI) DOCUMENTED: ICD-10-PCS | Mod: CPTII,S$GLB,, | Performed by: CLINICAL NURSE SPECIALIST

## 2023-03-30 PROCEDURE — 3075F SYST BP GE 130 - 139MM HG: CPT | Mod: CPTII,S$GLB,, | Performed by: CLINICAL NURSE SPECIALIST

## 2023-03-30 PROCEDURE — 99214 PR OFFICE/OUTPT VISIT, EST, LEVL IV, 30-39 MIN: ICD-10-PCS | Mod: S$GLB,,, | Performed by: CLINICAL NURSE SPECIALIST

## 2023-03-30 PROCEDURE — A9585 GADOBUTROL INJECTION: HCPCS | Performed by: PSYCHIATRY & NEUROLOGY

## 2023-03-30 PROCEDURE — 3078F PR MOST RECENT DIASTOLIC BLOOD PRESSURE < 80 MM HG: ICD-10-PCS | Mod: CPTII,S$GLB,, | Performed by: CLINICAL NURSE SPECIALIST

## 2023-03-30 PROCEDURE — 3008F BODY MASS INDEX DOCD: CPT | Mod: CPTII,S$GLB,, | Performed by: CLINICAL NURSE SPECIALIST

## 2023-03-30 PROCEDURE — 99214 OFFICE O/P EST MOD 30 MIN: CPT | Mod: S$GLB,,, | Performed by: CLINICAL NURSE SPECIALIST

## 2023-03-30 PROCEDURE — 99999 PR PBB SHADOW E&M-EST. PATIENT-LVL IV: CPT | Mod: PBBFAC,,, | Performed by: CLINICAL NURSE SPECIALIST

## 2023-03-30 PROCEDURE — 70553 MRI BRAIN STEM W/O & W/DYE: CPT | Mod: TC

## 2023-03-30 PROCEDURE — 99999 PR PBB SHADOW E&M-EST. PATIENT-LVL IV: ICD-10-PCS | Mod: PBBFAC,,, | Performed by: CLINICAL NURSE SPECIALIST

## 2023-03-30 PROCEDURE — 3075F PR MOST RECENT SYSTOLIC BLOOD PRESS GE 130-139MM HG: ICD-10-PCS | Mod: CPTII,S$GLB,, | Performed by: CLINICAL NURSE SPECIALIST

## 2023-03-30 PROCEDURE — 1159F PR MEDICATION LIST DOCUMENTED IN MEDICAL RECORD: ICD-10-PCS | Mod: CPTII,S$GLB,, | Performed by: CLINICAL NURSE SPECIALIST

## 2023-03-30 PROCEDURE — 70553 MRI BRAIN STEM W/O & W/DYE: CPT | Mod: 26,,, | Performed by: RADIOLOGY

## 2023-03-30 PROCEDURE — 1159F MED LIST DOCD IN RCRD: CPT | Mod: CPTII,S$GLB,, | Performed by: CLINICAL NURSE SPECIALIST

## 2023-03-30 PROCEDURE — 70553 MRI BRAIN DEMYELINATING W/ WO CONTRAST: ICD-10-PCS | Mod: 26,,, | Performed by: RADIOLOGY

## 2023-03-30 PROCEDURE — 25500020 PHARM REV CODE 255: Performed by: PSYCHIATRY & NEUROLOGY

## 2023-03-30 PROCEDURE — 3078F DIAST BP <80 MM HG: CPT | Mod: CPTII,S$GLB,, | Performed by: CLINICAL NURSE SPECIALIST

## 2023-03-30 RX ORDER — OXCARBAZEPINE 600 MG/1
600 TABLET, FILM COATED ORAL 2 TIMES DAILY
Qty: 60 TABLET | Refills: 5 | Status: SHIPPED | OUTPATIENT
Start: 2023-03-30

## 2023-03-30 RX ORDER — PHENAZOPYRIDINE HYDROCHLORIDE 100 MG/1
100 TABLET, FILM COATED ORAL 3 TIMES DAILY
COMMUNITY
Start: 2023-02-16

## 2023-03-30 RX ORDER — ESTRADIOL 0.1 MG/G
1 CREAM VAGINAL
COMMUNITY
Start: 2023-03-28

## 2023-03-30 RX ORDER — CIPROFLOXACIN 500 MG/1
500 TABLET ORAL 2 TIMES DAILY
COMMUNITY
Start: 2023-01-25

## 2023-03-30 RX ORDER — GADOBUTROL 604.72 MG/ML
7 INJECTION INTRAVENOUS
Status: COMPLETED | OUTPATIENT
Start: 2023-03-30 | End: 2023-03-30

## 2023-03-30 RX ORDER — TAMSULOSIN HYDROCHLORIDE 0.4 MG/1
1 CAPSULE ORAL NIGHTLY
COMMUNITY
Start: 2023-02-16

## 2023-03-30 RX ADMIN — GADOBUTROL 7 ML: 604.72 INJECTION INTRAVENOUS at 11:03

## 2023-03-30 NOTE — Clinical Note
Please send [employer] equipment application and diagnosis letter to patient. Mail is ok.  Also, she has a brand new powerchair that she hates. She really wanted a scooter, but received a new chair. Is there any chance we can try to get a scooter for her through one of the foundations? Or do you think the chair company would possibly take it back?

## 2023-03-30 NOTE — PROGRESS NOTES
"Subjective:          Patient ID: Deb Figueroa is a 62 y.o. female who presents today for a routine clinic visit for MS.  She was last seen in 2022. The history has been provided by the patient. She is accompanied by her .     MS HPI:  DMT: Kesimpta   Side effects from DMT? No  Taking vitamin D3 as recommended? No   She had kidney stones in February.   She denies any recent infections.   She has had some falls since the last visit.   She continues to have some memory issues. She admits to feeling confused sometimes. She asks the same questions every day. At one point, she thought there were 2 of her --one at her new house and one at her old house. Her  feels like her memory is getting better in the past 10 days or so.   Her  reports that her strength has been quite low since the kidney stones. She has good days on which she is able to function independently, but there are some days during which she can't and will mostly stay in bed. Her  reports that they need more grab bars.   She uses a manual wheelchair at home. She has a powerchair, but does not use this because it is "too big." She would like to get another scooter.   She did PT last year and wants to go back now.   TN is well controlled with oxcarbazepine.   She admits to some depression.   She wears diapers for urinary incontinence and has a commode on the side of the bed.     Medications:  Current Outpatient Medications   Medication Sig    amlodipine (NORVASC) 5 MG tablet Take 5 mg by mouth once daily.    buPROPion (WELLBUTRIN XL) 150 MG TB24 tablet TAKE ONE TABLET BY MOUTH ONCE A DAY    chlorhexidine (PERIDEX) 0.12 % solution SMARTSI Capful(s) By Mouth Twice Daily    cholecalciferol, vitamin D3, 1,250 mcg (50,000 unit) capsule Take 1 capsule (50,000 Units total) by mouth every 7 days.    escitalopram oxalate (LEXAPRO) 20 MG tablet TAKE 1 TABLET (20 MG TOTAL) BY MOUTH EVERY EVENING.    lorazepam (ATIVAN) 2 MG " Tab Take 1 tablet (2 mg total) by mouth 3 (three) times daily as needed. (Patient taking differently: Take 2 mg by mouth every evening.)    ofatumumab (KESIMPTA PEN) 20 mg/0.4 mL PnIj Loading Dose: Inject 20 mg (0.4 ml) SQ at week 0, 1 and 2.    ofatumumab (KESIMPTA PEN) 20 mg/0.4 mL PnIj Inject 20 mg into the skin every 28 days.    OXcarbazepine (TRILEPTAL) 600 MG Tab TAKE ONE TABLET BY MOUTH TWICE A DAY    rosuvastatin (CRESTOR) 10 MG tablet Take 10 mg by mouth every evening.      SOCIAL HISTORY  Social History     Tobacco Use    Smoking status: Every Day    Smokeless tobacco: Never   Substance Use Topics    Alcohol use: No    Drug use: No       Living arrangements - the patient lives with their spouse.           Objective:        1. 25 foot timed walk:    Neurologic Exam     Mental Status   Oriented to person, place, and time.   Attention: normal. Concentration: normal.   Speech: speech is normal   Level of consciousness: alert  Knowledge: consistent with education.   She looked to her  to answer most questions.      Cranial Nerves     CN V   Right facial sensation deficit: none  Left facial sensation deficit: none    CN VIII   Hearing: intact  Mild right EVAN      Motor Exam     Strength   Right biceps: 4/5  Left biceps: 4/5  Right triceps: 4/5  Left triceps: 4/5  Right wrist flexion: 4/5  Left wrist flexion: 3/5  Right wrist extension: 4/5  Left wrist extension: 3/5  Right interossei: 4/5  Left interossei: 3/5  Right iliopsoas: 1/5  Left iliopsoas: 1/5  Right quadriceps: 2/5  Left quadriceps: 2/5  Right hamstrin/5  Left hamstrin/5  Right anterior tibial: 2/5  Left anterior tibial: 1/5  Right gastroc: 2/5  Left gastroc: 1/5    Sensory Exam   Right arm vibration: normal  Left arm vibration: normal  She does not feel vibration or light touch in the bilateral lower extremities      Gait, Coordination, and Reflexes     Gait  Gait: (unable )    Coordination   Finger to nose coordination:  abnormal (dysmetria bilaterally )    Reflexes   Right biceps: 2+  Left biceps: 2+  Right patellar: 3+  Left patellar: 3+  Right achilles: 1+  Left achilles: 1+  Right plantar: equivocal  Left plantar: equivocal  She has ataxia with normal sitting      20/30 OD and 20/25 OS     Imaging:       Results for orders placed during the hospital encounter of 04/07/22    MRI Brain Demyelinating W W/O Contrast    Impression  Findings in the cerebral white matter and cervical/thoracic cord which are typical for multiple sclerosis inter similar in number and distribution compared to prior exam.  No new or enhancing lesions to suggest active disease.    Nonspecific right thyroid nodule measuring 21 mm mildly increased in size but noted dating back to 2015.    Multiple prominent parapelvic renal cysts again identified.  Hydronephrosis on the left is difficult to exclude CT imaging to be considered if clinically warranted.    Electronically signed by resident: Angelika Ding  Date:    04/07/2022  Time:    10:43    Electronically signed by: Robin Pacheco  Date:    04/07/2022  Time:    15:37    Results for orders placed during the hospital encounter of 04/07/22    MRI Cervical Spine Demyelinating W W/O Contrast    Impression  Findings in the cerebral white matter and cervical/thoracic cord which are typical for multiple sclerosis inter similar in number and distribution compared to prior exam.  No new or enhancing lesions to suggest active disease.    Nonspecific right thyroid nodule measuring 21 mm mildly increased in size but noted dating back to 2015.    Multiple prominent parapelvic renal cysts again identified.  Hydronephrosis on the left is difficult to exclude CT imaging to be considered if clinically warranted.    Electronically signed by resident: Angelika Ding  Date:    04/07/2022  Time:    10:43    Electronically signed by: Robin Pacheco  Date:    04/07/2022  Time:    15:37    Results for orders placed during the  hospital encounter of 04/07/22    MRI Thoracic Spine Demyelinating W W/O Contrast    Impression  Findings in the cerebral white matter and cervical/thoracic cord which are typical for multiple sclerosis inter similar in number and distribution compared to prior exam.  No new or enhancing lesions to suggest active disease.    Nonspecific right thyroid nodule measuring 21 mm mildly increased in size but noted dating back to 2015.    Multiple prominent parapelvic renal cysts again identified.  Hydronephrosis on the left is difficult to exclude CT imaging to be considered if clinically warranted.    Electronically signed by resident: Angelika Ding  Date:    04/07/2022  Time:    10:43    Electronically signed by: Robin Pacheco  Date:    04/07/2022  Time:    15:37    FINDINGS:  Intracranial Compartment:     Ventricles are stable in size the 3rd ventricle diameter up 1.1 cm.  Ventricular enlargement slightly out of proportion of degree of sulcal enlargement.     The brain parenchyma appears unchanged.  Patchy and confluent T2/FLAIR hyperintense lesions in keeping with the reported history of multiple sclerosis.  These appear stable in size, number and distribution from the prior exam.  No new discrete lesions.  No enhancing lesions.     No new parenchymal mass, hemorrhage or infarction.     No extra-axial blood or fluid collections.     Normal vascular flow voids are preserved.     Skull/Extracranial Contents (limited evaluation):     Bone marrow signal intensity is normal.     Impression:     Findings compatible with the reported history of multiple sclerosis.  No new or enhancing lesions to indicate ongoing or active demyelination.     Ventriculomegaly out of proportion to the degree of sulcal enlargement.  This is likely related to cerebral volume loss, but the configuration does at least raise the possibility of normal pressure hydrocephalus. Clinical correlation required.        Electronically signed by: Cholo  MD Ramos  Date:                                            03/30/2023  Time:                                           12:37           Exam Ended: 03/30/23 11:38             Labs:     Lab Results   Component Value Date    UEIILQDN94WD 19 (L) 06/27/2022    [id number] 20 (L) 03/06/2020    [id number] 7 (L) 07/18/2018       Lab Results   Component Value Date    WBC 7.18 06/27/2022    RBC 5.16 06/27/2022    HGB 13.8 06/27/2022    HCT 43.3 06/27/2022    MCV 84 06/27/2022    MCH 26.7 (L) 06/27/2022    MCHC 31.9 (L) 06/27/2022    RDW 14.3 06/27/2022     06/27/2022    MPV 11.2 06/27/2022    GRAN 4.9 06/27/2022    GRAN 68.3 06/27/2022    LYMPH 1.6 06/27/2022    LYMPH 22.6 06/27/2022    MONO 0.4 06/27/2022    MONO 5.4 06/27/2022    EOS 0.2 06/27/2022    BASO 0.06 06/27/2022    EOSINOPHIL 2.8 06/27/2022    BASOPHIL 0.8 06/27/2022     Sodium   Date Value Ref Range Status   08/19/2021 139 136 - 145 mmol/L Final     Potassium   Date Value Ref Range Status   08/19/2021 3.3 (L) 3.5 - 5.1 mmol/L Final     Chloride   Date Value Ref Range Status   08/19/2021 104 95 - 110 mmol/L Final     CO2   Date Value Ref Range Status   08/19/2021 22 (L) 23 - 29 mmol/L Final     Glucose   Date Value Ref Range Status   08/19/2021 233 (H) 70 - 110 mg/dL Final     BUN   Date Value Ref Range Status   08/19/2021 12 6 - 20 mg/dL Final     Creatinine   Date Value Ref Range Status   08/19/2021 1.0 0.5 - 1.4 mg/dL Final     Calcium   Date Value Ref Range Status   08/19/2021 9.6 8.7 - 10.5 mg/dL Final     Total Protein   Date Value Ref Range Status   04/07/2022 8.3 6.0 - 8.4 g/dL Final     Albumin   Date Value Ref Range Status   04/07/2022 3.8 3.5 - 5.2 g/dL Final     Total Bilirubin   Date Value Ref Range Status   04/07/2022 0.2 0.1 - 1.0 mg/dL Final     Comment:     For infants and newborns, interpretation of results should be based  on gestational age, weight and in agreement with clinical  observations.    Premature Infant recommended  reference ranges:  Up to 24 hours.............<8.0 mg/dL  Up to 48 hours............<12.0 mg/dL  3-5 days..................<15.0 mg/dL  6-29 days.................<15.0 mg/dL       Alkaline Phosphatase   Date Value Ref Range Status   04/07/2022 131 55 - 135 U/L Final     AST   Date Value Ref Range Status   04/07/2022 20 10 - 40 U/L Final     ALT   Date Value Ref Range Status   04/07/2022 8 (L) 10 - 44 U/L Final     Anion Gap   Date Value Ref Range Status   08/19/2021 13 8 - 16 mmol/L Final     eGFR if    Date Value Ref Range Status   08/19/2021 >60 >60 mL/min/1.73 m^2 Final     eGFR if non    Date Value Ref Range Status   08/19/2021 >60 >60 mL/min/1.73 m^2 Final     Comment:     Calculation used to obtain the estimated glomerular filtration  rate (eGFR) is the CKD-EPI equation.        Lab Results   Component Value Date    HEPBSAG Negative 06/27/2022    HEPBSAB Negative 06/27/2022    HEPBCAB Negative 06/27/2022       MS Impression and Plan:     NEURO MULTIPLE SCLEROSIS IMPRESSION:   MS Status:     Number of relapses in the past year?:  0    MRI Progression:  Stable  Plan:     DMT:  No change in management    DMT comment:  Continue Kesimpta and Vitamin D. Safety labs are due today. She is aware of the risks associated with immunosuppressant therapy, including increased risk of infection.       Symptom Management:  Implement change in symptom management    Implement Change in Symptom Management:  Pain (She will continue oxcarbazepine.)       We will send [employer] equipment application and letter of diagnosis to patient for any needed equipment, such as grab bars.   She does not like her current powerchair. It is too big, and she feels more comfortable in her scooter. Her current scooter is banged up, plastic is cracked in the back, and treads on wheels are wearing down. I will discuss options with social workers.     Order for PT has been entered.     Total time spent with patient: 36  minutes  Total time spent on encounter: 50 minutes            JANES Jiang, CNS

## 2023-04-05 ENCOUNTER — DOCUMENTATION ONLY (OUTPATIENT)
Dept: NEUROLOGY | Facility: CLINIC | Age: 63
End: 2023-04-05
Payer: COMMERCIAL

## 2023-04-20 ENCOUNTER — TELEPHONE (OUTPATIENT)
Dept: PSYCHIATRY | Facility: CLINIC | Age: 63
End: 2023-04-20
Payer: COMMERCIAL

## 2023-04-20 NOTE — TELEPHONE ENCOUNTER
LVM with [employer] to f/u - Deb says she is not using wheelchair as it is too heavy. Is there anyway to remedy this?

## 2023-05-03 ENCOUNTER — DOCUMENTATION ONLY (OUTPATIENT)
Dept: NEUROLOGY | Facility: CLINIC | Age: 63
End: 2023-05-03
Payer: COMMERCIAL

## 2023-05-05 NOTE — TELEPHONE ENCOUNTER
Left 2nd VM with Pavel at [facility] - Let Jayesh know that we were waiting to hear back. Recommenced that if he could, to call or stop by Carter Ross.

## 2023-06-13 ENCOUNTER — DOCUMENTATION ONLY (OUTPATIENT)
Dept: NEUROLOGY | Facility: CLINIC | Age: 63
End: 2023-06-13
Payer: COMMERCIAL

## 2023-06-15 ENCOUNTER — DOCUMENTATION ONLY (OUTPATIENT)
Dept: NEUROLOGY | Facility: CLINIC | Age: 63
End: 2023-06-15
Payer: COMMERCIAL

## 2023-06-27 ENCOUNTER — TELEPHONE (OUTPATIENT)
Dept: NEUROLOGY | Facility: CLINIC | Age: 63
End: 2023-06-27
Payer: COMMERCIAL

## 2023-06-27 DIAGNOSIS — R41.89 COGNITIVE CHANGE: Primary | ICD-10-CM

## 2023-06-27 NOTE — TELEPHONE ENCOUNTER
----- Message from Antoinette Weiss sent at 6/27/2023  8:49 AM CDT -----  Regarding: appt access/pt advice  Contact: [phone]  Deb Figueroa /Jose Alfredoson calling regarding appt access for memory loss. He states it only happens at bedtime and he thinks it has something to do with her MS. Please call

## 2023-07-05 ENCOUNTER — OFFICE VISIT (OUTPATIENT)
Dept: NEUROLOGY | Facility: CLINIC | Age: 63
End: 2023-07-05
Payer: COMMERCIAL

## 2023-07-05 DIAGNOSIS — R41.3 MEMORY LOSS: ICD-10-CM

## 2023-07-05 DIAGNOSIS — R93.0 ABNORMAL MRI OF HEAD: ICD-10-CM

## 2023-07-05 DIAGNOSIS — Z87.440 HISTORY OF URINARY TRACT INFECTION: ICD-10-CM

## 2023-07-05 DIAGNOSIS — G35 MS (MULTIPLE SCLEROSIS): Primary | ICD-10-CM

## 2023-07-05 DIAGNOSIS — F22 CAPGRAS DELUSION: ICD-10-CM

## 2023-07-05 PROCEDURE — 99499 NO LOS: ICD-10-PCS | Mod: 95,,, | Performed by: PSYCHIATRY & NEUROLOGY

## 2023-07-05 PROCEDURE — 96121 PR NEUROBEHAVIORAL STAT EXAM, EA ADDTL HR: ICD-10-PCS | Mod: 93,95,, | Performed by: PSYCHIATRY & NEUROLOGY

## 2023-07-05 PROCEDURE — 96121 NUBHVL XM PHY/QHP EA ADDL HR: CPT | Mod: 93,95,, | Performed by: PSYCHIATRY & NEUROLOGY

## 2023-07-05 PROCEDURE — 99499 UNLISTED E&M SERVICE: CPT | Mod: 95,,, | Performed by: PSYCHIATRY & NEUROLOGY

## 2023-07-05 PROCEDURE — 96116 NUBHVL XM PHYS/QHP 1ST HR: CPT | Mod: 93,95,, | Performed by: PSYCHIATRY & NEUROLOGY

## 2023-07-05 PROCEDURE — 96116 PR NEUROBEHAVIORAL STATUS EXAM BY PSYCH/PHYS: ICD-10-PCS | Mod: 93,95,, | Performed by: PSYCHIATRY & NEUROLOGY

## 2023-07-07 ENCOUNTER — TELEPHONE (OUTPATIENT)
Dept: NEUROLOGY | Facility: CLINIC | Age: 63
End: 2023-07-07
Payer: COMMERCIAL

## 2023-07-07 ENCOUNTER — TELEPHONE (OUTPATIENT)
Dept: PSYCHIATRY | Facility: CLINIC | Age: 63
End: 2023-07-07
Payer: COMMERCIAL

## 2023-07-07 NOTE — TELEPHONE ENCOUNTER
Alan contacted pt's , Jayesh. He shared that they rcvd a wc that is to heavy and bulky to use in the home. They would like a scooter that allows her to better navigate her daily activities when she doesn't use her travel wc.      Alan contacted [facility] [phone]) to discuss pt's concerns. They shared that they would contact the pt today to identify issue and develop plan.    Alan informed pt's .

## 2023-07-07 NOTE — TELEPHONE ENCOUNTER
----- Message from Elvin Will PharmD sent at 7/5/2023  4:25 PM CDT -----  Regarding: Kesimpta PA renewal  Hi Jackie and Team:    We received a new PA request form from [facility] to complete and send back for Liban Fitzgerald. If appropriate, can you send us a new order to begin the PA reauthorization process?     Thank you,  Elvin Will PharmD  Clinical Pharmacist/Supervisor   [place] [address]  [phone]

## 2023-07-14 NOTE — TELEPHONE ENCOUNTER
Sw contacted pt's , who shared that he had connected with [employer] and that they will be sending someone out to reevaluate pt on 8/9/23.   [Other: ____] : [unfilled]

## 2023-07-20 ENCOUNTER — DOCUMENTATION ONLY (OUTPATIENT)
Dept: NEUROLOGY | Facility: CLINIC | Age: 63
End: 2023-07-20
Payer: COMMERCIAL

## 2023-07-21 ENCOUNTER — PATIENT MESSAGE (OUTPATIENT)
Dept: PSYCHIATRY | Facility: CLINIC | Age: 63
End: 2023-07-21
Payer: COMMERCIAL

## 2023-08-11 ENCOUNTER — TELEPHONE (OUTPATIENT)
Dept: PSYCHIATRY | Facility: CLINIC | Age: 63
End: 2023-08-11

## 2023-08-11 NOTE — TELEPHONE ENCOUNTER
Sw f/u w/pt  regarding evaluation for wc by Chandni scheduled for 8/9/23. Pt  shared that the OT was sick and eval has been rs to 8/18/23

## 2023-08-22 ENCOUNTER — OFFICE VISIT (OUTPATIENT)
Dept: NEUROLOGY | Facility: CLINIC | Age: 63
End: 2023-08-22
Payer: COMMERCIAL

## 2023-08-22 DIAGNOSIS — G35 MS (MULTIPLE SCLEROSIS): Primary | ICD-10-CM

## 2023-08-22 DIAGNOSIS — Z87.440 HISTORY OF URINARY TRACT INFECTION: ICD-10-CM

## 2023-08-22 DIAGNOSIS — F41.9 ANXIETY: ICD-10-CM

## 2023-08-22 DIAGNOSIS — R41.89 COGNITIVE CHANGE: ICD-10-CM

## 2023-08-22 DIAGNOSIS — F22 CAPGRAS DELUSION: ICD-10-CM

## 2023-08-22 DIAGNOSIS — F02.84 MAJOR NEUROCOGNITIVE DISORDER DUE TO MULTIPLE ETIOLOGIES, WITH ANXIETY: ICD-10-CM

## 2023-08-22 DIAGNOSIS — R93.0 ABNORMAL MRI OF HEAD: ICD-10-CM

## 2023-08-22 PROCEDURE — 99999 PR PBB SHADOW E&M-EST. PATIENT-LVL I: ICD-10-PCS | Mod: PBBFAC,,, | Performed by: PSYCHIATRY & NEUROLOGY

## 2023-08-22 PROCEDURE — 99999 PR PBB SHADOW E&M-EST. PATIENT-LVL I: CPT | Mod: PBBFAC,,, | Performed by: PSYCHIATRY & NEUROLOGY

## 2023-08-22 PROCEDURE — 96139 PR PSYCH/NEUROPSYCH TEST ADMIN/SCORING, BY TECH, 2+ TESTS, EA ADDTL 30 MIN: ICD-10-PCS | Mod: S$GLB,,, | Performed by: PSYCHIATRY & NEUROLOGY

## 2023-08-22 PROCEDURE — 96138 PR PSYCH/NEUROPSYCH TEST ADMIN/SCORING, BY TECH, 2+ TESTS, 1ST 30 MIN: ICD-10-PCS | Mod: S$GLB,,, | Performed by: PSYCHIATRY & NEUROLOGY

## 2023-08-22 PROCEDURE — 99499 NO LOS: ICD-10-PCS | Mod: S$GLB,,, | Performed by: PSYCHIATRY & NEUROLOGY

## 2023-08-22 PROCEDURE — 96132 PR NEUROPSYCHOLOGIC TEST EVAL SVCS, 1ST HR: ICD-10-PCS | Mod: S$GLB,,, | Performed by: PSYCHIATRY & NEUROLOGY

## 2023-08-22 PROCEDURE — 96133 PR NEUROPSYCHOLOGIC TEST EVAL SVCS, EA ADDTL HR: ICD-10-PCS | Mod: S$GLB,,, | Performed by: PSYCHIATRY & NEUROLOGY

## 2023-08-22 PROCEDURE — 96133 NRPSYC TST EVAL PHYS/QHP EA: CPT | Mod: S$GLB,,, | Performed by: PSYCHIATRY & NEUROLOGY

## 2023-08-22 PROCEDURE — 96132 NRPSYC TST EVAL PHYS/QHP 1ST: CPT | Mod: S$GLB,,, | Performed by: PSYCHIATRY & NEUROLOGY

## 2023-08-22 PROCEDURE — 99499 UNLISTED E&M SERVICE: CPT | Mod: S$GLB,,, | Performed by: PSYCHIATRY & NEUROLOGY

## 2023-08-22 PROCEDURE — 96139 PSYCL/NRPSYC TST TECH EA: CPT | Mod: S$GLB,,, | Performed by: PSYCHIATRY & NEUROLOGY

## 2023-08-22 PROCEDURE — 99211 OFF/OP EST MAY X REQ PHY/QHP: CPT | Mod: PBBFAC | Performed by: PSYCHIATRY & NEUROLOGY

## 2023-08-22 PROCEDURE — 96138 PSYCL/NRPSYC TECH 1ST: CPT | Mod: S$GLB,,, | Performed by: PSYCHIATRY & NEUROLOGY

## 2023-08-28 ENCOUNTER — TELEPHONE (OUTPATIENT)
Dept: NEUROLOGY | Facility: CLINIC | Age: 63
End: 2023-08-28
Payer: COMMERCIAL

## 2023-08-28 DIAGNOSIS — G35 MULTIPLE SCLEROSIS: ICD-10-CM

## 2023-08-28 RX ORDER — OFATUMUMAB 20 MG/.4ML
INJECTION, SOLUTION SUBCUTANEOUS
Qty: 0.4 ML | Refills: 0 | Status: ACTIVE | OUTPATIENT
Start: 2023-08-28 | End: 2023-09-19 | Stop reason: SDUPTHER

## 2023-08-28 NOTE — TELEPHONE ENCOUNTER
[facility]. [facility] [place] is preferred.     Prescription routed to correct pharmacy    Labs due in September

## 2023-08-28 NOTE — TELEPHONE ENCOUNTER
----- Message from Nereida Barajas RN sent at 8/28/2023  2:38 PM CDT -----  Regarding: FW: Refill  Contact: [facility] [phone] ext [phone]    ----- Message -----  From: Sola Brooks  Sent: 8/28/2023  12:27 PM CDT  To: Goran MCCAIN Staff  Subject: Refill                                           [facility] is calling to refill Rx: KESIMPTA PEN 20 mg/0.4 mL PnIj 0.4 mL please call       [facility], [facility] - [place], [place] - [address]  [address]  [address]  [facility] [address]  Phone: [phone] Fax: [phone]

## 2023-09-11 NOTE — TELEPHONE ENCOUNTER
Sw spoke to pt's  about results of [facility]. He shared that they had the visit on 8/18, but the OT never called back when he said he would. Coleen gave number for [facility] and encouraged him to call. If pt does not hear back, coleen will follow up with Blanca Munoz of [facility].

## 2023-09-11 NOTE — PROGRESS NOTES
NEUROPSYCHOLOGY CONSULT  [facility]      Referral Information  Name: Deb Figueroa   : 1960  Age: 62 y.o.    Race: White    Gender: female     MRN: [id number]  Handedness: Right  Education: 12 years      Referring Provider:   JANES Lynch, CNS  Referral Reason/Medical Necessity: Ms. Figueroa has a history of multiple sclerosis. Neuropsychological evaluation was requested to assess current cognitive functioning, aid in differential diagnosis, and provide treatment recommendations.    Consent/Emergency Plan: The patient expressed an understanding of the purpose of the evaluation and consented to all procedures. I informed the patient of limits to confidentiality and discussed an emergency plan. Pt accompanied today by her , Jayesh.  Procedures/Billing: Please see billing table at the end of this report.  Referral Diagnosis: R41.89 (ICD-10-CM) - Cognitive change      SUMMARY    Ms. Figueroa is a 62 y.o. White female with a history of multiple sclerosis, recurrent kidney stones presenting for evaluation of recent cognitive decline. Pt's  reports cognitive symptoms for the last 1.5 years, which worsened following an episode of kidney stones/possible infection in 2023, and have continued to worsen over the last month. Confusion waxes and wanes, appears worse at night. She demonstrates Capgras delusion. Neuroimaging is stable from a disease progression standpoint in MS, though notable for extensive periventricular white matter lesions and enlarged ventricles, neuroradiology notes cannot rule out NPH. No recent reversible dementia labs. She denies acute urinary symptoms today. Anticholinergic burden is elevated (ACB = 5; bupropion, escitalopram, lorazepam, oxycarbazepine). Sleep is notable for increased vivid dreams recently without abnormal movements as well as some hypersomnolence. No parkinsonism noted on neuro exam. Pt reports increasing anxiety. No agitation or personality  changes. Pt is disoriented to date, her own age. She does ok with ADLs,  manages iADLs.     Neuropsychological testing found relatively severe global, diffuse impairment. All cognitive domains are impacting, including attention, processing speed, executive functioning, memory, language, and visuospatial functioning. She endorsed moderate depression and high average anxiety on self-report screeners. She required frequent redirection to follow instructions correctly.     Ms. Figueroa is much more impaired than I would expect for MS alone, particularly since she has been largely clinically stable for some time. From a cognitive standpoint, global impairment is not inconsistent with NPH, however given baseline issues with gait and urinary incontinence, it is difficult to say whether she has had a worsening of these factors over the same timeframe. She is following with neurosurgery, if she benefits from high volume LP this will be informative. There is possibly some residual delirium from kidney/urinary issues, though it would be unusual to have impairments this extensive nearly 8 months later. Will discuss delirium prevention strategies at feedback. Certainly high lesion burden in MS likely contributes some baseline slowed processing and general cognitive difficulties, and makes her vulnerable to other pathologies. I cannot rule out an underlying neurodegenerative process. Capgras can be seen in MS, but is more common in dementia with Lewy Bodies or Alzheimer's disease. Increasing vivid dreams and cognitive fluctuations also raise concern for DLB, though parkinsonism not found on neuro exams. I also wonder whether increasing anxiety is neuropsychiatric in nature. She meets criteria for major neurocognitive disorder today. Will plan to repeat her testing in 6 to 12 months to assess for change.       IMPRESSIONS      1. MS (multiple sclerosis)        2. Cognitive change  Ambulatory referral/consult to  "Neuropsychology      3. Major neurocognitive disorder due to multiple etiologies, with anxiety        4. Capgras delusion        5. History of urinary tract infection        6. Abnormal MRI of head        7. Anxiety            PLAN     Ms. Figueroa is scheduled for feedback  Can consider sending to Dr Leon for additional workup to rule out other underlying neurodegenerative process   Will refer to caregiver support program  Will provide delirium prevention strategies   Consider med adjustment to reduce anticholinergic burden   RTC 6 to 12 months to repeat testing       Thank you for allowing me to participate in Ms. Figueroa's care.  If you have any questions, please contact me.    Caitlin Tracey PsyD  Clinical Neuropsychologist  Department of Neurology  [facility]        SUBJECTIVE:     HISTORY OF PRESENT ILLNESS   Ms. Figueroa is a 62 y.o. White female with a history of multiple sclerosis referred for neuropsychological evaluation.     Accompanied by her  today. She relies on him to provide the history. He says at night she forgets where she is, doesn't recognize him. Tells him "You're jot Jayesh. You're the other Jayesh" Asks to go home. They lost their home in [place], have been living at a new house. Frequently she is not remembering that she is in a new house. Wants to go back to the "red brick house" which is the home they lost.     Recent episode of kidney stones in Feb with possible associated infection   Memory loss since [place], worse recently, also worse at night   Weak since kidney stones   Good days and bad days. Memory loss fluctuates with fluctuations in strength and physical symptoms.   UI, wears diapers, has bedside commode. Has had kidney stones removed about 5 times in the past. Has had nocturnal UI for years - maybe 3 to 4 years. Stable. Wears diapers during the day but will still use the restroom. Still has leaking and sometimes won't make it.   She denies current painful urination or " "urinary urgency or frequency  Has not been able to walk for years due to MS. Going to PT right now.   On last neuro exam, some left sided weakness, ataxia. BLE sensory loss. Bilateral dysmetria.      Stable MRI   Jayesh says at their last neuro visit, there was concern for NPH, he is worried about this. Notes they are planning an LP.       Cognitive Symptoms:  Attention: A little more distractible, losing her train of thought.   Mental Speed: Thinking seems slower  Memory:STM loss. Worse at night. Not recognizing  or her new house for the last month or so. Says "You're the other Jayesh." She doesn't remember this. She also seems more confused, thinks she can drive. In the morning, seems more normal, but sometimes during the day she is still asking the same questions over and over.   Language: No word finding   Visuospatial/Perceptual: Not getting lost/turned around in the house. But doesn't think that she lives there.   Executive Functioning: Doesn't do anything complex, is in bed most of the day when  is at work. He takes care of all planning, organizing, multitasking.   Orientation: Cannot tell me the date. Guesses it is 1930. Says president is Boy. Knows her , but says she is 42. Knows she is in her new house in [facility].     Onset/Course: Ms. Kisers symptoms were gradual in onset around 12 to 18 months ago and are worsening, particularly after the kidney stones and subsequently over the last two months. The pt denies any exacerbating or ameliorating factors. They report significant waxing/waning cognitive status   Medication for Cognition: None    Sleep: If she is having a confused night, will stay up. Wants to leave and go to the old house.   Total Hours/Night: variable   Pattern: No sleep issues when not confused. Actually will have some hypersomnia (15+ hrs) unless confused, then she is agitated and up all night.   XUAN: Never had a sleep study, but pt with risk factors including:, snoring, " "excessive daytime sleepiness, waking up with dry mouth , morning headaches, dx of HTN, and age 50+  Acting out dreams: Not moving when she's sleeping, but pt reports increased vivid dreams recently.   Daytime Naps: Endorsed  Appetite: normal   Energy: decreased     Mood:  Treatment History: Pt denied a history of any formal mental health diagnosis or treatment.   Self-Described Mood: "I have good days and bad days." A few bad days per month.   Depressed Mood: Endorsed   Anxiety: Endorsed always been a worrier. Does find her anxiety is worse recently. Worries about family issue, but  clarifies that this occurred four years ago.  does find she always seemed worried about everything these days.   Panic Attacks: Denied    Current Stressors: lost home in [place], living in new home for the last 9 months   SI/HI: Denied   Psychiatric Hospitalization: Denied      Neuropsychiatric:  Personality Change: Denied    Delusional/Paranoid Thinking: Endorsed, Capgras delusion. Mostly at night. Otherwise, no.   Hallucinations: Denied  Impulsive/Compulsive Behaviors: Denied  Disinhibition: Denied  Apathy: Denied  Irritability/Agitation: Denied    Physical Sx:  Motor:  generalized weakness, worse on the left. No tremor. No trouble swallowing. Sometimes dropping things.   Gait:  Has used a wheelchair for years. Has had a few falls recently onto her knees, getting out of bed or trying to use the restroom. Sometimes, will get confused, try to do things she shouldn't. Sometimes will try to grab something off the ground, fall out of her wheelchair.   Sensory: no change to taste or smell. Hearing and vision are ok.   Pain: Ms. Figueroa described typical daily pain at a 0 on a scale of 1-10, with 10 being worst.     Current Functioning (I/ADLs):  ADLs: Requires assistance/oversight for dressing, showering. Sometimes helps her with toileting.   IADLs:  Finances: Dependent,  has always done this.   Medication Mgmt:Dependent, " " has done this for the last few years. She was not making mistakes, he just started filling the pillbox and giving it to her before bed.   Driving: Does not participate in this activity  Household Mgmt: Independent for basic meal preparation. No issues using appliances. Never been great with the telephone.   Vocational:  Retired  Safety Concerns: falls    RELEVANT HISTORY:  Prior Testing:  None    Neurologic History  Seizures: Denied  Stroke: Denied  TBI: Denied  Movement Disorder: Denied  Falls: Endorsed  CNS infection: Denied  Headache/Migraine: Denied   Urinary Incontinence: Endorsed  Chronic UTI's:  Endorsed, chronic kidney stones, sometimes with infection. Most recently in Feb.   Prior Episode of Delirium:  Endorsed  Other neurologic history: multiple sclerosis   Dysautonomia: urinary symptoms    Substance Use History:  Social History     Tobacco Use    Smoking status: Every Day    Smokeless tobacco: Never   Substance and Sexual Activity    Alcohol use: No    Drug use: No    Sexual activity: Yes     Partners: Male     Comment:    Smokes 1/2 ppd for the last 40+ years   Caffeine                    Four cans of soda per day     Family History:  Family History   Problem Relation Age of Onset    Cancer Father         prostate    Hypertension Mother     Breast cancer Neg Hx     Ovarian cancer Neg Hx     Colon cancer Neg Hx      Family Neurologic History: Negative for heritable risk factors. No dementia, no parkinsonism   Family Psychiatric History: Negative for heritable risk factors     Developmental/Academic Hx:  Developmental: Born and raised in [place], [place].  Product of a normal pregnancy and delivery. Thinks she walked late - says she "couldn't get up, I would just lay in bed." Started school on time. Grew up speaking Mexican and English. Doesn't speak Mexican as often now. No history of abuse.  Academic:  Learning Difficulties: "Pretty good." B's and C's. No history of formal learning disorder " diagnosis. Never repeated a grade.  Attention Difficulties: Denied. Never diagnosed with or treated for ADHD.  Behavioral Difficulties: Denied  Educational Attainment: 12, graduated HS on time.     Social/Occupational Hx:  Occupational:  Occupational Status: Disabled since MS diagnosis (2007)  Primary Occupation(s): Dental hygienist   Social:  Resides: at home with    Family Status:  30+ years. No children.   Social Support:  Pt endorses adequate social support.   Daily Activities: Sleeps a lot. Activities are hard because she is confined to a wheelchair. Also likes to watch TV.       Objective:     Neurodiagnostics:  Results for orders placed or performed during the hospital encounter of 10/16/15   MRI Brain W WO Contrast    Narrative    Technique: MRI brain with and without contrast performed with a demyelinating protocol.  7 cc Gadavist intravenous contrast was administered.    History: Multiple sclerosis     Comparison: MRI 8/19/2013    Findings:      Within the cerebral white matter, there is extensive confluent T2 and FLAIR signal hyperintensity, largely situated in the periventricular zones along the margins of the corpus callosum and lateral ventricles radiating outward.  Additional lesions noted in the right thalamus, left cerebellar peduncle, and evan.  While this pattern is nonspecific, it is quite typical for the clinically suspected diagnosis of multiple sclerosis with a moderate to severe plaque burdon. There is overall white matter volume loss. Following contrast administration, there is no evidence of abnormal enhancement to suggest active demyelinating plaques.    No mass, hemorrhage, infarction, subdural collection, or hydrocephalus is seen on this cranial MR examination.  No diffusion restriction is seen to suggest acute infarct.  There is mucosal thickening and partial opacification of the left maxillary sinus.  The mastoid air cells are clear.    Impression    Findings in the  "cerebral white matter which are typical for multiple sclerosis with moderate to severe degree of underlying plaque burden.  Compared to the prior examination of 2013, the overall number and signal intensity of the white matter lesions is stable.  No new enhancing plaques are seen to suggest active disease.    Left maxillary sinus disease.  ______________________________________     Electronically signed by resident: Josh Canales  Date:     10/16/15  Time:    16:12          As the supervising and teaching physician, I personally reviewed the images and resident's interpretation and I agree with the findings.          Electronically signed by: Aisha Stevens MD  Date:     10/16/15  Time:    16:16        Pertinent Labs:  Lab Results   Component Value Date    [id number] 2020     No results found for: "VITAMINB1"  Lab Results   Component Value Date    RPR Non-reactive 2020     No results found for: "FOLATE"  Lab Results   Component Value Date    TSH 0.641 09/10/2020     Lab Results   Component Value Date    CALCIUM 9.6 2021    PHOS 3.3 09/10/2020      Lab Results   Component Value Date    HGBA1C 5.7 (H) 2020     Lab Results   Component Value Date    [id number] Negative 2018           Current Outpatient Medications:     amlodipine (NORVASC) 5 MG tablet, Take 5 mg by mouth once daily., Disp: , Rfl:     buPROPion (WELLBUTRIN XL) 150 MG TB24 tablet, TAKE ONE TABLET BY MOUTH ONCE A DAY, Disp: 30 tablet, Rfl: 10    chlorhexidine (PERIDEX) 0.12 % solution, SMARTSI Capful(s) By Mouth Twice Daily, Disp: , Rfl:     cholecalciferol, vitamin D3, 1,250 mcg (50,000 unit) capsule, Take 1 capsule (50,000 Units total) by mouth every 7 days., Disp: 12 capsule, Rfl: 3    ciprofloxacin HCl (CIPRO) 500 MG tablet, Take 500 mg by mouth 2 (two) times daily., Disp: , Rfl:     escitalopram oxalate (LEXAPRO) 20 MG tablet, TAKE 1 TABLET (20 MG TOTAL) BY MOUTH EVERY EVENING., Disp: 30 tablet, Rfl: 3    " estradioL (ESTRACE) 0.01 % (0.1 mg/gram) vaginal cream, Place 1 g vaginally 3 (three) times a week., Disp: , Rfl:     lorazepam (ATIVAN) 2 MG Tab, Take 1 tablet (2 mg total) by mouth 3 (three) times daily as needed. (Patient taking differently: Take 2 mg by mouth every evening.), Disp: 90 tablet, Rfl: 0    ofatumumab (KESIMPTA PEN) 20 mg/0.4 mL PnIj, INJECT 20MG INTO THE SKIN EVERY 28 DAYS, Disp: 0.4 mL, Rfl: 0    OXcarbazepine (TRILEPTAL) 600 MG Tab, Take 1 tablet (600 mg total) by mouth 2 (two) times daily., Disp: 60 tablet, Rfl: 5    phenazopyridine (PYRIDIUM) 100 MG tablet, Take 100 mg by mouth 3 (three) times daily., Disp: , Rfl:     rosuvastatin (CRESTOR) 10 MG tablet, Take 10 mg by mouth every evening. , Disp: , Rfl: 11    tamsulosin (FLOMAX) 0.4 mg Cap, Take 1 capsule by mouth every evening., Disp: , Rfl:     Medical history  Patient Active Problem List   Diagnosis    MS (multiple sclerosis)    Multiple sclerosis exacerbation    Neurologic gait dysfunction    TMJ (dislocation of temporomandibular joint)    Trigeminal neuralgia    Fatigue    Hypertension    Paraplegia    Right nephrolithiasis    Hyperlipidemia    Emphysematous pyelitis    Immunosuppression    High risk medication use    Lower extremity weakness    Depression    Tobacco use disorder    At high risk for falls    Neurogenic bladder    Gait disturbance    Multiple thyroid nodules     Past Medical History:   Diagnosis Date    Hypertension     MS (multiple sclerosis)     Nephrolithiasis      Past Surgical History:   Procedure Laterality Date    LITHOTRIPSY  2016    Large renal stone.         OBJECTIVE:       MENTAL STATUS AND BEHAVIORAL OBSERVATIONS:  Appearance:  Casually dressed and Well groomed  Behavior:   alert, calm, cooperative, and rapport easily established  Orientation:   Disoriented to date  Sensory:   Hearing and vision appeared adequate for testing purposes.  Gait:   not observed; pt in wheelchair  Psychomotor:  Poor dexterity    Speech:  Fluent and spontaneous. Normal volume, rate, pitch, tone, and prosody.  Language:  No evidence of word-finding difficulties in conversational speech., Response to simple commands. Difficulty with complex commands.   Mood:   euthymic  Affect:   Flat, eyes wide, tongue out  Thought Process: vague  Thought Content: Denied current SI/HI. and No evidence of psychotic symptoms.  Judgment/Insight: Limited  Validity:  Performance on standalone and embedded performance validity measures suggested variable test engagement. As a result, some results may underestimate the pt's actual abilities. However, overall testing is thought to be commensurate with her reported history and level of functioning.   Test Taking Bx:  Per psychometrist observations, Mrs. Figueroa arrived to testing with her . She used an electric wheelchair. She reported having reading glasses but did not use them. She had no problems with hearing. She had poor control of her wheelchair as she bumped into a door and walls on the way to the testing room. She had poor motor skills with drawing and often dropped pegs. She positioned the pegs in the opposite direction; for example, if the pegboard had the round side towards her, she turn the peg to have the round side away from her. She skipped around on the board and needed redirection; she discontinued on both hands for grooved pegboard. She stared confusingly at the psychometrist when asked to draw the hands of the clock. She corrected her clock numbers several time before being able to move on. She kept her tongue out throughout testing and blinked hard. She held up her fingers to indicate what numbers she wanted on BDI and STAI. All questionnaires needed to be read to her.       PROCEDURES/TESTS ADMINISTERED:  In addition to performing a review of pertinent medical records, reviewing limits to confidentiality, conducting a clinical interview, and explaining procedures, the following measures  were administered:   [place] Cognitive Assessment (MOCA), Green's MSVT, TOMM, Test of Premorbid Functioning (TOPF),  Controlled Oral Word Association Test (FAS/Rochelle et al., 2004), Animal Naming (Rochelle et al., 2004), Trail Making Test (Rochelle et al., 2004), Peterson Complex Figure Test  (Copy Trial) , Repeatable Battery for the Assessment of Neuropsychological Status (RBANS: A, Update) with story and figure recognition, Clock Drawing (Schcynthialen et al. 2006 norms), Grooved Pegboard Test (Rochelle et al., 2004) State Trait Anxiety Inventory: Short Form (STAI-SF), Malave Depression Inventory - Second Edition (BDI-II), Frontal Assessment Battery (TEE), and Symbol Digit Modalities Test (SDMT) oral and written versions. Manual norms were used unless otherwise indicated.     NEUROPSYCHOLOGICAL ASSESSMENT RESULTS:  The following should not be interpreted in isolation from the neuropsychological evaluation report.  Scores on stand-alone and embedded performance validity measures were variable.      Raw Score Score Type Standardized Score Percentile/CP Descriptor   MSVT IR 80 - - - -   MSVT DR 90 - - - -   MSVT Cons 70 - - - -   MSVT PA 30 - - - -   MSVT FR 35 - - - -   TOMM 1 39 - - - -   TOMM 2 46 - - - -   TOMM R 45 - - - -   RBANS EI 0 - -      PREMORBID FUNCTIONING Raw Score Score Type Standardized Score Percentile/CP Descriptor   TOPF simple dem. eFSIQ - SS 98 45 Average   TOPF pred. eFSIQ - SS 65 1 Exceptionally Low    TOPF simple + pred. eFSIQ - SS 80 9 Low Average   COGNITIVE SCREENING Raw Score Score Type Standardized Score Percentile/CP Descriptor   MoCA 9 - - - Impaired   Orientation - Place 2/2 - - - -   Orientation - Date 0/4 - - - -   RBANS         Immediate Memory - SS 49 0.1 Exceptionally Low    VS/Construction - SS 56 0.2 Exceptionally Low    Language - SS 54 0.1 Exceptionally Low    Attention - SS 53 0.1 Exceptionally Low    Delayed Memory - SS 48 0.1 Exceptionally Low    Total Scale - SS 47 0.1 Exceptionally Low     LANGUAGE FUNCTIONING Raw Score Score Type Standardized Score Percentile/CP Descriptor   RBANS Naming 6 ss - <2 Exceptionally Low   RBANS Semantic Fluency 9 ss 3 1 Exceptionally Low    TOPF Word Reading 12 SS 71 3 Below Average   FAS 18 Tscore 31 3 Below Average   Animal Naming 9 Tscore 25 1 Exceptionally Low    VISUOSPATIAL FUNCTIONING Raw Score Score Type Standardized Score Percentile/CP Descriptor   RBANS Line Orientation  8 ss - <2 Exceptionally Low   RBANS Figure Copy 7 ss 1 0 Exceptionally Low    RCFT Copy 5 - - <1 Exceptionally Low   RCFT Time to Copy 724 - - <1 Exceptionally Low   Clock Request 2 - - 1.7 Exceptionally Low   Clock Copy 4 - - 10 Low Average   Clock Total 7 - - 1.7 Exceptionally Low   LEARNING & MEMORY Raw Score Score Type Standardized Score Percentile/CP Descriptor   RBANS         Immediate Memory - SS 49 0.1 Exceptionally Low    Delayed Memory - SS 48 0.1 Exceptionally Low    List Learning 11 ss 2 0 Exceptionally Low    List Recall 0 ss - <2 Exceptionally Low   List Recognition 16 ss - <2 Exceptionally Low   Story Memory 6 ss 2 0.4 Exceptionally Low    Story Recall 1 ss 2 0.4 Exceptionally Low    Story Recognition  7 - - 5-7 Below Average   Figure Recall 0 ss 1 0.1 Exceptionally Low    Figure Recognition 0 - - - -   ATTENTION/WORKING MEMORY Raw Score Score Type Standardized Score Percentile/CP Descriptor   RBANS Digit Span  6 ss 4 2 Below Average   MENTAL PROCESSING SPEED Raw Score Score Type Standardized Score Percentile/CP Descriptor   SMDT Written 13 zscore -3.69 0 Exceptionally Low    SMDT Oral 15 zscore -3.77 0 Exceptionally Low    RBANS Coding 15 ss 2 0 Exceptionally Low    TMT A  106 Tscore 16 <0.1 Exceptionally Low   TMT A errors 1 - - - -   EXECUTIVE FUNCTIONING Raw Score Score Type Standardized Score Percentile/CP Descriptor   TMT B 294 Tscore 23 0 Exceptionally Low    TMT B errors 3 - - - -   TEE 7 zscore -8.45 - Exceptionally Low   Clock Request 2 - - 1.7 Exceptionally Low    FRONTOMOTOR  Raw Score Score Type Standardized Score Percentile/CP Descriptor   GPT DH DC Tscore N/A N/A N/A   GPD NDH DC Tscore N/A N/A N/A   MOOD & PERSONALITY Raw Score Score Type Standardized Score Percentile/CP Descriptor   BDI-2 23 - - - Moderate   STAISF:State 24 - - 88 High Average   STAISF:Trait 18 - - 58 Average       PROCESS NOTES:     Peterson Complex Figure Copy:          Billing/Services Summary          Neurobehavioral Status Exam Base Code (09838)  Total Units: 0    Face-to-face Total Time: 0 min.         Professional Neuropsychological Testing Evaluation Services Base Code (33197)   Total Units: 1 Add-on (66961)  Total Units: 3   Referral review/initial test selection 15 min.    Intra-session Clinical Decision-Making          Tech consult/test review/modification 0 min.         Patient behavior management 0 min.    Face-to-face Interpretive Feedback 60 min.    Record Review/Integration/Report Generation 180 min.     Total Time: 255 min.         Test Administration by Psychologist Base Code (00918)   Total Units: 0 Add-on (72633)  Total Units: 0   Testing 0 min.    Scoring 0 min.     Total Time: 0 min.         Test Administration by Technician  Technician Name: MAGDA Royal Base Code (51179)   Total Units: 1 Add-on (01489)\  Total Units: 6   Face-to-Face Testin min.    Scoring 65 min.     Total Time: 206 min.    DOS is the date of the evaluation unless specified

## 2023-09-13 ENCOUNTER — OFFICE VISIT (OUTPATIENT)
Dept: NEUROLOGY | Facility: CLINIC | Age: 63
End: 2023-09-13
Payer: MEDICARE

## 2023-09-13 DIAGNOSIS — G35 MS (MULTIPLE SCLEROSIS): ICD-10-CM

## 2023-09-13 DIAGNOSIS — F02.84 MAJOR NEUROCOGNITIVE DISORDER DUE TO MULTIPLE ETIOLOGIES, WITH ANXIETY: Primary | ICD-10-CM

## 2023-09-13 DIAGNOSIS — N39.0 URINARY TRACT INFECTION WITHOUT HEMATURIA, SITE UNSPECIFIED: ICD-10-CM

## 2023-09-13 PROCEDURE — 99499 UNLISTED E&M SERVICE: CPT | Mod: S$PBB,,, | Performed by: PSYCHIATRY & NEUROLOGY

## 2023-09-13 PROCEDURE — 99499 NO LOS: ICD-10-PCS | Mod: S$PBB,,, | Performed by: PSYCHIATRY & NEUROLOGY

## 2023-09-13 PROCEDURE — 99999 PR PBB SHADOW E&M-EST. PATIENT-LVL I: CPT | Mod: PBBFAC,,, | Performed by: PSYCHIATRY & NEUROLOGY

## 2023-09-13 PROCEDURE — 99999 PR PBB SHADOW E&M-EST. PATIENT-LVL I: ICD-10-PCS | Mod: PBBFAC,,, | Performed by: PSYCHIATRY & NEUROLOGY

## 2023-09-13 PROCEDURE — 99211 OFF/OP EST MAY X REQ PHY/QHP: CPT | Mod: PBBFAC | Performed by: PSYCHIATRY & NEUROLOGY

## 2023-09-13 NOTE — PATIENT INSTRUCTIONS
What is delirium?  Delirium is a state of confusion that comes on very suddenly and lasts hours to days. When your loved one becomes delirious, it means she/he cannot think very clearly, can't pay attention and is not really aware of their environment. Sometimes people will call it other things such as a change in mental status, encephalopathy, or ICU psychosis.  There are abnormal changes in the person's level of consciousness and thinking. The person may be sleepy, or may appear to be withdrawn and depressed (hypoactive delirium) or agitated (hyperactive delirium), or alternate between these states. The changes may be subtle initially.  The person often has difficulty maintaining focus. He/she may change the subject frequently in a conversation, have difficulty retaining new information, mention strange ideas, or be disoriented (in place or in time). Some patients have visual hallucinations.  Younger people tend to recover more quickly, while those who are older or already have a chronic or serious medical condition will take longer to recover so that the delirium can continue off and on for a period of weeks or months even after the acute medical illness has settled.    What can cause it?  Many things can cause delirium including medications, dehydration, infections and lack of sleep. People can develop delirium following a surgical procedure. Often, a combination of problems causes delirium.    Potential Causes Examples   Infectious Sepsis, encephalitis, meningitis, syphillis, central nervous system abscess   Withdrawal Alcohol, barbituates, sedative-hypnotics   Acute metabolic Acidosis, electrolyte disturbance, hepatic/renal failure, other metabolic disturances (glucose, magnesium, calcium)   Trauma Head injury, severe burns, broken bones   CNS disease Hemorrhage, stroke, vasculitis, seizure, tumor   Hypoxia Acute hypoxia, chronic lung disease, hypotension   Deficiencies  Vitamin B12, hypovitaminosis, niacin,  thiamine   Enviromental Hypo/hyperthermia, endocrinopathies, diabetes, adrenal dysfunction, thyroid dysfunction   Acute vascular Hypertensive emergency, subarachnoid hemorrhage, sagittal vein thrombosis    Toxins/drugs Medication changes, street drugs, alcohols, pesticides, industrial poisons, carbon monoxide, cyanide, solvents, etc   Heavy metals Lead, mercury        It is very common for older adults to develop delirium while hospitalized. Many things can make delirium worse while hospitalized, including physical restraints, bed rest, bladder catheters and certain medications.  The important point is that health care providers should look for the causes and treat them; and they should be careful not to do things that might make delirium worse.    What are the risk factors?  Advanced age  Underlying brain diseases such as dementia, stroke, or Parkinson disease, particularly when there are current problems with memory  Use of multiple medications (particularly psychiatric drugs and sedatives), or multiple medical problems  Sudden withdrawal of a regular medication or cessation of regular alcohol use  Frailty, malnutrition, immobility  Advanced cancer  Under-treated pain (although excessive use of opioid pain medication for pain control can also impair brain function)  Immobilization, including physical restraints  Use of bladder catheters  Limb fractures  Interventions, including diagnostic tests  Poor eyesight or hearing  Sleep deprivation  Organ failure (eg, chronic lung disease; heart, kidney, or liver failure)    My loved one has dementia; could that be causing this confusion?  Delirium and dementia can co-exist but they are not the same. Dementia comes on gradually and is a permanent condition.  Delirium can develop suddenly and usually goes away in days to weeks if treated properly.  It is important to note though, that people who have dementia are at an increased risk of developing delirium.    What can I do  to help?  If you are a family member or friend, try to make it clear to doctors and other health care providers what your loved one's usual mental status is (This is often called a patient's baseline) and how that has changed.   Communicate clearly & concisely with your loved one; don't say too much or bring up complicated issues; you can repeat verbal reminders about what day it is, or the time and location, if it seems to make your loved one feel more secure. However, be careful not to overdo this as it can cause frustration or agitation in some people.  Your loved one may not recognize you. If this is the case, do not take it personally, this is a common occurrence and an accepted part of the condition. Introduce yourself each time if necessary.  Your loved one may say and do things that are completely out of character. Remind yourself that this is due to the delirium and that this will recover when the delirium improves or resolves.  If you try to help your loved one with some basic needs (getting to the chair or bathroom, helping them eat or dress), try to keep instructions simple (this is called a one-step command); if your loved ones can't do what needs to be done, do not argue or try to reason; simply try later.  If they are in the hospital, it can help to bring in familiar objects from your loved one's home.  You can use TV or radio for relaxation and to help maintain contact with outside world but be careful because in some cases TV and radio (external stimulation) may cause anxiety and agitation and can disrupt sleep - make sure it is not left on at night.  Remind yourself that hallucinations and delusions are related to the delirium; do not directly dispute hallucinations and delusions expressed by your loved ones; instead provide reassurance.  Related to the previous point: If your loved one is fixated or stuck on a topic or issue that is causing them anxiety or agitation, sometimes changing the  subject or the environment (getting them out of the bed or room) works better than trying to resolve the issue.  Discuss with other family and friends who are visitors about the above, and remind them one of the most important strategies in the care of someone who has delirium is to help them feel secure and safe.    Can I prevent it from happening again?  Once you've had an episode of delirium, you are at increased risk for more episodes. The following strategies are helpful to prevent delirium whenever possible:  Ensure the person gets adequate, restful sleep at night, but do not allow them to stay in bed and sleep all day. Contact their medical team if sleep is a problem.   Ensure they eat healthy foods and drink enough water. Staying hydrated is especially important to avoid electrolyte imbalances and urinary tract infections. They need to be getting enough calories and protein to stabilize their blood sugar.   Avoid alcohol and other substances  Make sure medications are being taken regularly at the correct times without missed or incorrect doses. Avoid suddenly discontinuing any medications, particularly without guidance from medical professionals.   Help the person keep to a routine as much as possible. Erratic schedules can prolong confusion. Try to make their environment predictable and familiar.   Try to ensure the person gets out of bed at least once per day. Ideally, help them get outside into the sunshine, which will help support their normal circadian rhythm.   For those who are cognitively intact, provide cognitive stimulation, such as large-print magazines, or crossword or word search puzzles.  If your loved one wears glasses, ask him/her to put them on. If they have still have poor vision, consider the following:   Make sure they are wearing their glasses whenever they are awake.  Provide a magnifier and large-print menus, books, and magazines.  Put red tape on call bell, water pitcher, TV remote  control, telephone, and personal items to make them easier to locate.  If the patient is hard of hearing, consider the following:   Use a portable amplifying device (Pocket Talker).  Limit background noise and face the patient, but don't shout.  If the person uses a hearing aid, make sure they are wearing it and that it's working properly.  If the patient uses a catheter or has urinary incontinence, check in frequently for signs of a urinary tract infection. Ensure that incontinence pads and liners are changed often, and the person is bathing/cleaning regularly. People often do not spontaneously report pain or discomfort from UTI, so you may need to ask directly or get an at-home test.   Ensure pain is adequately treated        Adapted from: https://www.GIDEEN.com/contents/delirium-beyond-the-basics#H3  https://americandeliriumsociety.org/about-delirium/patientfamily  https://journals.lww.com/nursing/FullText/2007/24193/How_to_prevent_delirium__A_practical_protocol.17.aspx      Urinary Tract Infections and Older Adults     Urinary tract infections (UTIs) are very common. In fact, UTIs cause more than 8 million doctors visits every year, but untreated, they can lead to serious health issues like kidney infection or sepsis -- especially in older adults. Preventing UTIs and knowing what symptoms to look out for are great ways to protect your or your loved ones overall health. But you might be surprised to learn that UTIs dont show the same signs for everyone, especially when it comes to men and older adults.    Thats why its important for caregivers to know what to look for.    UTI Symptoms in Older Adults  If youve ever had a urinary tract infection, you may be familiar with some of the classic symptoms:    Cloudy, bloody, or strong-smelling urine  A frequent or urgent need to use the bathroom  Pain or burning with urination  Low-grade fever  Night sweats  Cramping or pressure in the lower abdomen  The single  best sign of a UTI in an older person is often a sudden behavioral change.    Although these signs of a UTI might be ones you notice in yourself, an older loved one might have different symptoms because of their aging immune system.    As a caregiver, be on the lookout for any sudden changes in your loved ones behavior, such as loss of energy or appetite, or the inability to get dressed. Also watch for:    Confusion  Agitation  Hallucinations  Extreme fatigue  Poor motor skills  Dizziness  Falling  Sudden urinary incontinence    Some people are more likely to get a UTI than others. People with limited mobility, a weak (suppressed) immune system, or urinary tract blockages due to an enlarged prostate or kidney stones may be at a higher risk of getting a UTI.    If you see any of these signs or symptoms in your loved one, call their doctor right away!    How to Prevent UTIs in Older Adults  Getting a urinary tract infection can be uncomfortable, annoying, and downright painful -- especially for older adults. But there are simple steps you or your loved one can take to prevent UTIs:    Drink plenty of water, at least 64 ounces a day.  Go to the bathroom when you need to (holding it can lead to bacterial growth), and empty your bladder completely.  Drink cranberry juice (not cranberry juice cocktail) or take vitamin C or cranberry supplements to make urine less attractive to bacteria. Check with your loved ones doctor before starting a supplement, because it may interfere with medications.  Limit alcohol and caffeine.  Pee after sex.  Wear cotton underwear. Change daily.  Avoid feminine hygiene products such as douches and powders, and wipe from front to back.  Shower, rather than bathe, if possible. Always keep the genital area clean.  Limit use of catheters in those with limited or no mobility.  Avoiding UTIs altogether is, of course, the best thing to do. But knowing that they can show up in seniors without the  "classic symptoms can ensure treatment before more-serious health problems develop.      Source: https://www.[facility].org/patient-family-support/health-library/how-to-prevent-uti-in-elderly          Behavioral Management Strategies:  Remember that you cannot reason with acute psychosis or confusion. Unless there is an immediate safety issue, there is no need to challenge or correct the person.  For example, if a pt is hallucinating, do not argue with the person that what they are seeing is not real. If they are expressing paranoia, empathize gently with their fear, and attempt to redirect them to a different activity.   If the person is particularly stuck on a topic or activity, as long as the activity is safe and is not worsening their agitation, you don't need to intervene.   Communicate calmly, simply, and concisely  Reassure the person that they are safe and you are here to help  Try not to express irritation or anger  Speak quietly and calmly, do not shout or threaten the person. Avoid provocation.   Establish verbal contact and provide orientation and reassurance.  Attempt to identify the patient's wants and feelings, listen to what they are saying, and reflect those wants and feelings back to the patient.  Dont use sarcasm as a weapon   Set clear limits on behavior in a calm voice ("You cannot hit the nurse.")  Offer choices and optimism ("Which toothpaste would you like to use today?")  Attempt to redirect the person to an alternative behavior ("Can you come help me with this?)  Avoid saying things like, "We already went over this!" "You can't keep doing this." "I already explained this to you"  Instead, simply nod calmly and acknowledge what the person is saying ("Yes, that makes sense."), and then request their help or company in a different behavior.   Having a mental list of activities the patient enjoys can be helpful with redirection. For example, do they enjoy going for walks? Eating a piece of " "candy?   Sometimes activities which are repetitive can be calming, particularly if there is a comforting sensory element. For example, pt may enjoy folding soft towels or laundry.  Limit overstimulation  Decrease distractions by turning off the TV, computer, any fluorescent lights that hum, etc.  Ask any casual visitors to leave--the fewer people the better  If the person is very agitated, avoid touching them, and respect their personal space  Sit down and ask the person to sit down also    Preventative strategies:  Try to help the patient develop a routine and stick to it  Address all immediate safety issues  Does the person still have access to the car and keys? Take the keys away, or disconnect the car battery.  Are they wandering at night? Install locks on the outside doors. A keypad lock works well. Alarms on bedroom doors can also be helpful.   Are they turning on the stove and risking a fire? If you cannot limit their access to the kitchen, you may need to unplug dangerous devices any time you are not in the room.   If the person is exhibiting poor judgment throughout the day, they likely need someone supervising them at all times.   Do they still have access to significant financial assets? Limit their access to accounts, and consult with a  if necessary.   Identify situations that seem to trigger agitation or a problematic behavior, and modify the person's environment to minimize or eliminate that trigger.   For example, is their behavior worse if they don't get a good night's sleep? Or if they don't eat or drink enough? If so, prioritize those activities and build behavior plans to support them.  Do they get upset about not being allowed to answer the phone when it rings? Put all of the phones in the house on "silent."   Do they become paranoid that certain family members are trying to take advantage of them? Limit contact with the targeted family member as much as possible, and re-introduce them " slowly after some time has passed.   Encourage independence in daily living whenever safely possible  Encourage collaborative decision-making regarding his care as well as daily activities  Encourage regular physical exercise  Address issues that may be impacting sleep   Ex: Urology consult for frequent nighttime urination, address chronic pain that may be interfering with sleep, moving to a different room if his roommate snores loudly, make sure the room is a comfortable temperature and he has adequate pillows and blankets  Ensure he gets out into the sunlight at least once per day to encourage regular circadian cycle  No caffeine, sugar, or large meals within two hours of bedtime   Make sure room at night is dark and quiet and minimize nighttime interruptions from staff unless medically necessary   Try to help pt go to sleep and wake up at the same time every day  Monitor closely for worsening psychiatric symptoms (insomnia, social withdrawal, deterioration of personal hygiene, hostility, confusing or nonsensical speech, paranoia, hallucinations) and intervene promptly. Assess for possible antecedents, modify environment appropriately.      Sleep Hygiene: Poor sleep has a negative effect on cognition. Several strategies have been shown to improve sleep:   Caffeine intake in the afternoon and evening, as well as stuffing oneself at supper, can decrease the quality of restful sleep throughout the night.   Bedtime and wake-up times should be consistent every night and morning so the body becomes used to a single routine, even on the weekends.  Engage in daily physical activity, but not 2-3 hours before bedtime.   No technology use (television, computer, iPad) 1-2 hours before bed.   Have a wind down routine (e.g., soft lights in the house, bath before bed, reduced fluid intake, songs, reading, less noise) to promote sleep readiness.   Visit the www.sleepfoundation.org for more strategies.     Practice good  cognitive/brain health hygiene:  Engage in regular exercise, which increases alertness and arousal and can improve attention and focus.  Consider lower impact exercises, such as yoga or light walking.  Get a good nights sleep, as this can enhance alertness and cognition.  Eat healthy foods and balanced meals. It is notable that research indicates certain nutrients may aid in brain function, such as B vitamins (especially B6, B12, and folic acid), antioxidants (such as vitamins C and E, and beta carotene), and Omega-3 fatty acids. Talk with your physician or nutritionist about whats right for you.   Keep your brain active. Find activities to stay mentally active, such as reading, games (cards, checkers), puzzles (crosswords, Sudoku, jig saw), crafts (models, woodworking), gardening, or participating in activities in the community.  Stay socially engaged. Continue staying active with your family and friends.    Resources:   Consider resources for support through the Governors Office of Elderly Affairs (http://goea.[place].gov/), [place] Chapter of the Alzheimers Association (www.alz.org/[place]/), the Family Caregiver [place] (www.caregiver.org), and the American Psychological Association (http://www.apa.org/pi/about/publications/caregivers/consumers/index.aspxconsumers/index.aspx).    Prepare for the future:  The pt and caregivers should consider formal arrangements to allow a designated person to make medical and financial decisions for the pt, should he/she become unable to do so.  Options to consider include designating a healthcare proxy, medical and/or financial power of , and completing advanced directives for healthcare decisions and estate planning (e.g., finalizing a will).  If cost is prohibitive, [facility] [employer] (https://[employer].org/) provides free  for individuals with low income.

## 2023-09-13 NOTE — PROGRESS NOTES
NEUROPSYCHOLOGICAL EVALUATION FEEDBACK    Deb Figueroa attended a feedback session today accompanied by her .  No recent MS flares. We discussed the results of the neuropsychological evaluation (45 minutes).  Handouts provided in AVS. All of her questions were answered.    They have not actually seen neurosurgery yet, so advised to follow up with MS doc to see if it's indicated   He noticed urine was cloudy again  Advised to figure out if she is having repeat UTI's first, since she is typically asymptomatic. This could explain her presentation.   Then follow up with nsgy about LP if indicated   Then maybe Dr. Leon if everything else is getting ruled out.     Will send to [facility] for caregiver support. Got kidney stones out at [facility], so she did have a hospital visit.         1. Major neurocognitive disorder due to multiple etiologies, with anxiety        2. MS (multiple sclerosis)        3. Rule out Urinary tract infection without hematuria, site unspecified              Caitlin Tracey PsyD  Licensed Clinical Neuropsychologist  [facility] - Department of Neurology

## 2023-09-18 DIAGNOSIS — D84.9 IMMUNOSUPPRESSION: Primary | ICD-10-CM

## 2023-09-18 DIAGNOSIS — G35 MULTIPLE SCLEROSIS: ICD-10-CM

## 2023-09-18 NOTE — TELEPHONE ENCOUNTER
----- Message from Monae Yu sent at 9/18/2023 12:57 PM CDT -----  Regarding: refill  Contact: @[phone]  [facility] is calling to get a refill on ofatumumab (KESIMPTA PEN) 20 mg/0.4 mL PnIj please call  and adv @[phone]

## 2023-09-21 ENCOUNTER — LAB VISIT (OUTPATIENT)
Dept: LAB | Facility: HOSPITAL | Age: 63
End: 2023-09-21
Attending: CLINICAL NURSE SPECIALIST
Payer: COMMERCIAL

## 2023-09-21 DIAGNOSIS — G35 MULTIPLE SCLEROSIS: ICD-10-CM

## 2023-09-21 DIAGNOSIS — D84.9 IMMUNOSUPPRESSION: ICD-10-CM

## 2023-09-21 LAB
ALBUMIN SERPL BCP-MCNC: 3.9 G/DL (ref 3.5–5.2)
ALP SERPL-CCNC: 99 U/L (ref 55–135)
ALT SERPL W/O P-5'-P-CCNC: 10 U/L (ref 10–44)
ANION GAP SERPL CALC-SCNC: 15 MMOL/L (ref 8–16)
AST SERPL-CCNC: 17 U/L (ref 10–40)
BASOPHILS # BLD AUTO: 0.04 K/UL (ref 0–0.2)
BASOPHILS NFR BLD: 0.6 % (ref 0–1.9)
BILIRUB SERPL-MCNC: 0.3 MG/DL (ref 0.1–1)
BUN SERPL-MCNC: 16 MG/DL (ref 8–23)
CALCIUM SERPL-MCNC: 9.3 MG/DL (ref 8.7–10.5)
CHLORIDE SERPL-SCNC: 101 MMOL/L (ref 95–110)
CO2 SERPL-SCNC: 19 MMOL/L (ref 23–29)
CREAT SERPL-MCNC: 0.9 MG/DL (ref 0.5–1.4)
DIFFERENTIAL METHOD: NORMAL
EOSINOPHIL # BLD AUTO: 0.2 K/UL (ref 0–0.5)
EOSINOPHIL NFR BLD: 2.9 % (ref 0–8)
ERYTHROCYTE [DISTWIDTH] IN BLOOD BY AUTOMATED COUNT: 12.6 % (ref 11.5–14.5)
EST. GFR  (NO RACE VARIABLE): >60 ML/MIN/1.73 M^2
GLUCOSE SERPL-MCNC: 117 MG/DL (ref 70–110)
HBV CORE AB SERPL QL IA: NORMAL
HBV SURFACE AG SERPL QL IA: NORMAL
HCT VFR BLD AUTO: 41 % (ref 37–48.5)
HGB BLD-MCNC: 13.4 G/DL (ref 12–16)
IGA SERPL-MCNC: 295 MG/DL (ref 40–350)
IGG SERPL-MCNC: 1047 MG/DL (ref 650–1600)
IGM SERPL-MCNC: 25 MG/DL (ref 50–300)
IMM GRANULOCYTES # BLD AUTO: 0.01 K/UL (ref 0–0.04)
IMM GRANULOCYTES NFR BLD AUTO: 0.1 % (ref 0–0.5)
LYMPHOCYTES # BLD AUTO: 1.4 K/UL (ref 1–4.8)
LYMPHOCYTES NFR BLD: 20.5 % (ref 18–48)
MCH RBC QN AUTO: 28 PG (ref 27–31)
MCHC RBC AUTO-ENTMCNC: 32.7 G/DL (ref 32–36)
MCV RBC AUTO: 86 FL (ref 82–98)
MONOCYTES # BLD AUTO: 0.5 K/UL (ref 0.3–1)
MONOCYTES NFR BLD: 6.9 % (ref 4–15)
NEUTROPHILS # BLD AUTO: 4.8 K/UL (ref 1.8–7.7)
NEUTROPHILS NFR BLD: 69 % (ref 38–73)
NRBC BLD-RTO: 0 /100 WBC
PLATELET # BLD AUTO: 297 K/UL (ref 150–450)
PMV BLD AUTO: 9.6 FL (ref 9.2–12.9)
POTASSIUM SERPL-SCNC: 3.6 MMOL/L (ref 3.5–5.1)
PROT SERPL-MCNC: 7.4 G/DL (ref 6–8.4)
RBC # BLD AUTO: 4.79 M/UL (ref 4–5.4)
SODIUM SERPL-SCNC: 135 MMOL/L (ref 136–145)
WBC # BLD AUTO: 6.99 K/UL (ref 3.9–12.7)

## 2023-09-21 PROCEDURE — 80053 COMPREHEN METABOLIC PANEL: CPT | Performed by: CLINICAL NURSE SPECIALIST

## 2023-09-21 PROCEDURE — 85025 COMPLETE CBC W/AUTO DIFF WBC: CPT | Performed by: CLINICAL NURSE SPECIALIST

## 2023-09-21 PROCEDURE — 87340 HEPATITIS B SURFACE AG IA: CPT | Performed by: CLINICAL NURSE SPECIALIST

## 2023-09-21 PROCEDURE — 36415 COLL VENOUS BLD VENIPUNCTURE: CPT | Performed by: CLINICAL NURSE SPECIALIST

## 2023-09-21 PROCEDURE — 82784 ASSAY IGA/IGD/IGG/IGM EACH: CPT | Performed by: CLINICAL NURSE SPECIALIST

## 2023-09-21 PROCEDURE — 86704 HEP B CORE ANTIBODY TOTAL: CPT | Performed by: CLINICAL NURSE SPECIALIST

## 2023-09-22 RX ORDER — OFATUMUMAB 20 MG/.4ML
INJECTION, SOLUTION SUBCUTANEOUS
Qty: 0.4 ML | Refills: 5 | Status: SHIPPED | OUTPATIENT
Start: 2023-09-22 | End: 2024-03-19

## 2023-09-22 NOTE — TELEPHONE ENCOUNTER
Labs   9/21/23  WBC 6.99  ALC 1433  AST 17, ALT 10  IgG 1047 IgM 25 IgA 295  HBsAg -  anti-Hbc -, no current or past infection

## 2024-01-22 ENCOUNTER — PATIENT MESSAGE (OUTPATIENT)
Dept: PSYCHIATRY | Facility: CLINIC | Age: 64
End: 2024-01-22
Payer: COMMERCIAL

## 2024-02-06 NOTE — PROGRESS NOTES
[facility]  Urology  Progress Note    Patient Name: Deb Figueroa  MRN: [id number]  Admission Date: 9/3/2018  Hospital Length of Stay: 1 days  Code Status: Full Code   Attending Provider: Charles Chinchilla MD   Primary Care Physician: Primary Doctor No    Subjective:     HPI:  58 y/o female w/ h/o nephrolithiasis presented to outside ER yesterday with acute onset right flank pain. Workup demonstrated right renal stone and UTI and she was transferred here for urologic evaluation.    She had right PCNL 2-4 years ago at [facility].    She has been AF and HDS since presentation. IV abx started in ER. Pain is notably improved today and overall she feels much better.    Interval History: She is feeling well other than not sleeping well overnight. No flank pain. No fevers. No dysuria.     Review of Systems   Constitutional: Positive for fatigue. Negative for appetite change, chills and fever.   HENT: Negative for congestion, sore throat and trouble swallowing.    Eyes: Negative for pain and itching.   Respiratory: Negative for cough and shortness of breath.    Cardiovascular: Negative for chest pain, palpitations and leg swelling.   Gastrointestinal: Negative for abdominal distention, abdominal pain, constipation, diarrhea, nausea and vomiting.   Genitourinary: Negative for difficulty urinating, dysuria, flank pain, hematuria, nocturia and urgency.   Musculoskeletal: Negative for back pain, neck pain and neck stiffness.   Skin: Negative for rash and wound.   Neurological: Negative for dizziness and seizures.   Hematological: Negative for adenopathy. Does not bruise/bleed easily.   Psychiatric/Behavioral: Negative for confusion. The patient is not nervous/anxious.    All other systems reviewed and are negative.    Objective:     Temp:  [97.1 °F (36.2 °C)-100 °F (37.8 °C)] 97.2 °F (36.2 °C)  Pulse:  [] 99  Resp:  [18-20] 18  SpO2:  [90 %-95 %] 91 %  BP: (127-148)/(70-84) 135/70     Body mass index is 31.04  kg/m².            Drains          None          Physical Exam   Vitals reviewed.  Constitutional: She is oriented to person, place, and time. She appears well-developed and well-nourished. No distress.   HENT:   Head: Normocephalic and atraumatic.   Neck: Normal range of motion.   Cardiovascular: Normal rate and regular rhythm.    Pulmonary/Chest: Effort normal and breath sounds normal. No respiratory distress.   Abdominal: Soft. She exhibits no distension and no mass. There is no tenderness. There is no rebound and no guarding.   Musculoskeletal: Normal range of motion.   Neurological: She is alert and oriented to person, place, and time.   Skin: Skin is warm and dry. No rash noted. She is not diaphoretic. No erythema.     Psychiatric: She has a normal mood and affect. Her behavior is normal.       Significant Labs:    BMP:  Recent Labs   Lab  09/03/18 0528 09/04/18 0527   NA  137  138   K  3.4*  3.1*   CL  103  103   CO2  26  27   BUN  13  11   CREATININE  0.9  0.9   CALCIUM  9.4  9.6       CBC:   Recent Labs   Lab  09/03/18 0528 09/04/18 0527   WBC  11.04  9.88   HGB  12.0  12.0   HCT  37.3  37.0   PLT  252  272       Blood Culture: No results for input(s): LABBLOO in the last 168 hours.  Urine Culture: No results for input(s): LABURIN in the last 168 hours.  Urine Studies:   Recent Labs   Lab  09/03/18   1756   COLORU  Yellow   APPEARANCEUA  Clear   PHUR  7.0   SPECGRAV  1.020   PROTEINUA  Negative   GLUCUA  Negative   KETONESU  Negative   BILIRUBINUA  Negative   OCCULTUA  1+*   NITRITE  Negative   UROBILINOGEN  Negative   LEUKOCYTESUR  Trace*   RBCUA  3   WBCUA  25*   BACTERIA  Occasional   SQUAMEPITHEL  5       Significant Imaging:  All pertinent imaging results/findings from the past 24 hours have been reviewed.                  Assessment/Plan:     Emphysematous pyelitis    -- Clinically much improved with IV abx  -- Will defer stent/PCN for now due to lack of significant obstruction on CT and  improved clinical picture  -- OK to resume diet today        Right nephrolithiasis    -- Large renal stone without obstruction  -- Explained that she will need procedure (likely PCNL) at later date to treat this stone, however emergent intervention not indicated at this time          Follow up with with [facility] or urologist closer to her home. Discussed importance of follow up for treatment of stone.     VTE Risk Mitigation (From admission, onward)        Ordered     Place sequential compression device  Until discontinued      09/03/18 0147     Place ANEL hose  Until discontinued      09/03/18 0147     IP VTE HIGH RISK PATIENT  Once      09/03/18 0147          Misa Cabrera MD  Urology  [facility]   Telemetry

## 2024-03-08 DIAGNOSIS — G35 MULTIPLE SCLEROSIS: ICD-10-CM

## 2024-03-08 DIAGNOSIS — D84.9 IMMUNOSUPPRESSION: Primary | ICD-10-CM

## 2024-03-13 ENCOUNTER — TELEPHONE (OUTPATIENT)
Dept: NEUROLOGY | Facility: CLINIC | Age: 64
End: 2024-03-13
Payer: COMMERCIAL

## 2024-03-13 NOTE — TELEPHONE ENCOUNTER
----- Message from Nereida Barajas RN sent at 3/12/2024  2:50 PM CDT -----  Regarding: FW: pt advice  Contact: [phone]    ----- Message -----  From: Jeny Rubin  Sent: 3/12/2024   1:35 PM CDT  To: Petar CALDERÓN Staff  Subject: pt advice                                        Monique calling from [facility] in reference needing a refill on pt medication shamika BARROW call         ..    [facility], [place], [place] - [facility] [address]  [facility] [address]  [address]  [facility] [place] [address]  Phone: [phone] Fax: [phone]

## 2024-03-13 NOTE — TELEPHONE ENCOUNTER
Advised Chastity at [facility] that patient needs labs. Once labs are completed, additional refills will be sent in   (3) no apparent problem

## 2024-03-13 NOTE — TELEPHONE ENCOUNTER
Spoke to Jayesh and patient will go to [facility] this week. Patient has one injection on hand for 3/25 injection.

## 2024-03-15 NOTE — TELEPHONE ENCOUNTER
Spoke to pt's , Jayesh, and scheduled pt for labs today (3/15) at 2:30 PM at [facility]. I also scheduled pt for the next available appt with [facility] in the afternoon, per Jayesh's request. Pt is scheduled for an appt with [facility] on 5/21 at 1:40 PM.

## 2024-03-18 ENCOUNTER — LAB VISIT (OUTPATIENT)
Dept: LAB | Facility: HOSPITAL | Age: 64
End: 2024-03-18
Attending: CLINICAL NURSE SPECIALIST
Payer: COMMERCIAL

## 2024-03-18 DIAGNOSIS — D84.9 IMMUNOSUPPRESSION: ICD-10-CM

## 2024-03-18 DIAGNOSIS — G35 MULTIPLE SCLEROSIS: ICD-10-CM

## 2024-03-18 LAB
ALBUMIN SERPL BCP-MCNC: 4.2 G/DL (ref 3.5–5.2)
ALP SERPL-CCNC: 115 U/L (ref 55–135)
ALT SERPL W/O P-5'-P-CCNC: 13 U/L (ref 10–44)
ANION GAP SERPL CALC-SCNC: 8 MMOL/L (ref 8–16)
AST SERPL-CCNC: 19 U/L (ref 10–40)
BASOPHILS # BLD AUTO: 0.05 K/UL (ref 0–0.2)
BASOPHILS NFR BLD: 0.7 % (ref 0–1.9)
BILIRUB SERPL-MCNC: 0.2 MG/DL (ref 0.1–1)
BUN SERPL-MCNC: 12 MG/DL (ref 8–23)
CALCIUM SERPL-MCNC: 9.9 MG/DL (ref 8.7–10.5)
CHLORIDE SERPL-SCNC: 103 MMOL/L (ref 95–110)
CO2 SERPL-SCNC: 28 MMOL/L (ref 23–29)
CREAT SERPL-MCNC: 0.9 MG/DL (ref 0.5–1.4)
DIFFERENTIAL METHOD BLD: NORMAL
EOSINOPHIL # BLD AUTO: 0.2 K/UL (ref 0–0.5)
EOSINOPHIL NFR BLD: 2.8 % (ref 0–8)
ERYTHROCYTE [DISTWIDTH] IN BLOOD BY AUTOMATED COUNT: 13.2 % (ref 11.5–14.5)
EST. GFR  (NO RACE VARIABLE): >60 ML/MIN/1.73 M^2
GLUCOSE SERPL-MCNC: 116 MG/DL (ref 70–110)
HBV CORE AB SERPL QL IA: NORMAL
HBV SURFACE AG SERPL QL IA: NORMAL
HCT VFR BLD AUTO: 44.4 % (ref 37–48.5)
HGB BLD-MCNC: 14.6 G/DL (ref 12–16)
IGA SERPL-MCNC: 280 MG/DL (ref 40–350)
IGG SERPL-MCNC: 1121 MG/DL (ref 650–1600)
IGM SERPL-MCNC: 31 MG/DL (ref 50–300)
IMM GRANULOCYTES # BLD AUTO: 0.01 K/UL (ref 0–0.04)
IMM GRANULOCYTES NFR BLD AUTO: 0.1 % (ref 0–0.5)
LYMPHOCYTES # BLD AUTO: 1.5 K/UL (ref 1–4.8)
LYMPHOCYTES NFR BLD: 21.5 % (ref 18–48)
MCH RBC QN AUTO: 27.9 PG (ref 27–31)
MCHC RBC AUTO-ENTMCNC: 32.9 G/DL (ref 32–36)
MCV RBC AUTO: 85 FL (ref 82–98)
MONOCYTES # BLD AUTO: 0.4 K/UL (ref 0.3–1)
MONOCYTES NFR BLD: 6.3 % (ref 4–15)
NEUTROPHILS # BLD AUTO: 4.6 K/UL (ref 1.8–7.7)
NEUTROPHILS NFR BLD: 68.6 % (ref 38–73)
NRBC BLD-RTO: 0 /100 WBC
PLATELET # BLD AUTO: 248 K/UL (ref 150–450)
PMV BLD AUTO: 10.5 FL (ref 9.2–12.9)
POTASSIUM SERPL-SCNC: 4.2 MMOL/L (ref 3.5–5.1)
PROT SERPL-MCNC: 7.9 G/DL (ref 6–8.4)
RBC # BLD AUTO: 5.24 M/UL (ref 4–5.4)
SODIUM SERPL-SCNC: 139 MMOL/L (ref 136–145)
WBC # BLD AUTO: 6.78 K/UL (ref 3.9–12.7)

## 2024-03-18 PROCEDURE — 36415 COLL VENOUS BLD VENIPUNCTURE: CPT | Performed by: CLINICAL NURSE SPECIALIST

## 2024-03-18 PROCEDURE — 80053 COMPREHEN METABOLIC PANEL: CPT | Performed by: CLINICAL NURSE SPECIALIST

## 2024-03-18 PROCEDURE — 87340 HEPATITIS B SURFACE AG IA: CPT | Performed by: CLINICAL NURSE SPECIALIST

## 2024-03-18 PROCEDURE — 82784 ASSAY IGA/IGD/IGG/IGM EACH: CPT | Mod: 59 | Performed by: CLINICAL NURSE SPECIALIST

## 2024-03-18 PROCEDURE — 86704 HEP B CORE ANTIBODY TOTAL: CPT | Performed by: CLINICAL NURSE SPECIALIST

## 2024-03-18 PROCEDURE — 85025 COMPLETE CBC W/AUTO DIFF WBC: CPT | Performed by: CLINICAL NURSE SPECIALIST

## 2024-03-19 RX ORDER — OFATUMUMAB 20 MG/.4ML
INJECTION, SOLUTION SUBCUTANEOUS
Qty: 1.2 ML | Refills: 0 | Status: SHIPPED | OUTPATIENT
Start: 2024-03-19 | End: 2024-05-13 | Stop reason: SDUPTHER

## 2024-03-19 NOTE — TELEPHONE ENCOUNTER
Labs   3/18/24  WBC 6.78  ALC 1458  AST 19 , ALT 13  IgG 1121 IgM 31 IgA 280  HBsAg - anti-Hbc -, no current or past infection

## 2024-04-16 ENCOUNTER — PATIENT MESSAGE (OUTPATIENT)
Dept: NEUROLOGY | Facility: CLINIC | Age: 64
End: 2024-04-16
Payer: COMMERCIAL

## 2024-05-02 ENCOUNTER — PATIENT MESSAGE (OUTPATIENT)
Dept: PSYCHIATRY | Facility: CLINIC | Age: 64
End: 2024-05-02
Payer: COMMERCIAL

## 2024-05-13 DIAGNOSIS — D84.9 IMMUNOSUPPRESSION: ICD-10-CM

## 2024-05-13 DIAGNOSIS — G35 MULTIPLE SCLEROSIS: ICD-10-CM

## 2024-05-13 RX ORDER — OFATUMUMAB 20 MG/.4ML
INJECTION, SOLUTION SUBCUTANEOUS
Qty: 1.2 ML | Refills: 0 | Status: SHIPPED | OUTPATIENT
Start: 2024-05-13

## 2024-05-21 ENCOUNTER — OFFICE VISIT (OUTPATIENT)
Dept: NEUROLOGY | Facility: CLINIC | Age: 64
End: 2024-05-21
Payer: COMMERCIAL

## 2024-05-21 ENCOUNTER — LAB VISIT (OUTPATIENT)
Dept: LAB | Facility: HOSPITAL | Age: 64
End: 2024-05-21
Payer: COMMERCIAL

## 2024-05-21 VITALS
SYSTOLIC BLOOD PRESSURE: 143 MMHG | BODY MASS INDEX: 23.44 KG/M2 | HEIGHT: 65 IN | DIASTOLIC BLOOD PRESSURE: 88 MMHG | HEART RATE: 80 BPM

## 2024-05-21 DIAGNOSIS — F09 COGNITIVE DYSFUNCTION ACCOMPANYING MULTIPLE SCLEROSIS: ICD-10-CM

## 2024-05-21 DIAGNOSIS — E55.9 VITAMIN D DEFICIENCY: ICD-10-CM

## 2024-05-21 DIAGNOSIS — Z29.89 PROPHYLACTIC IMMUNOTHERAPY: ICD-10-CM

## 2024-05-21 DIAGNOSIS — Z74.09 IMPAIRED MOBILITY AND ADLS: ICD-10-CM

## 2024-05-21 DIAGNOSIS — Z79.899 HIGH RISK MEDICATION USE: ICD-10-CM

## 2024-05-21 DIAGNOSIS — Z78.9 IMPAIRED MOBILITY AND ADLS: ICD-10-CM

## 2024-05-21 DIAGNOSIS — G50.0 TRIGEMINAL NEURALGIA OF LEFT SIDE OF FACE: ICD-10-CM

## 2024-05-21 DIAGNOSIS — G35 COGNITIVE DYSFUNCTION ACCOMPANYING MULTIPLE SCLEROSIS: ICD-10-CM

## 2024-05-21 DIAGNOSIS — Z71.89 COUNSELING REGARDING GOALS OF CARE: ICD-10-CM

## 2024-05-21 DIAGNOSIS — G35 MULTIPLE SCLEROSIS: ICD-10-CM

## 2024-05-21 DIAGNOSIS — R32 URINARY INCONTINENCE, UNSPECIFIED TYPE: ICD-10-CM

## 2024-05-21 DIAGNOSIS — G35 MULTIPLE SCLEROSIS: Primary | ICD-10-CM

## 2024-05-21 PROCEDURE — 1159F MED LIST DOCD IN RCRD: CPT | Mod: CPTII,S$GLB,, | Performed by: CLINICAL NURSE SPECIALIST

## 2024-05-21 PROCEDURE — 99215 OFFICE O/P EST HI 40 MIN: CPT | Mod: S$GLB,,, | Performed by: CLINICAL NURSE SPECIALIST

## 2024-05-21 PROCEDURE — G2211 COMPLEX E/M VISIT ADD ON: HCPCS | Mod: S$GLB,,, | Performed by: CLINICAL NURSE SPECIALIST

## 2024-05-21 PROCEDURE — 3008F BODY MASS INDEX DOCD: CPT | Mod: CPTII,S$GLB,, | Performed by: CLINICAL NURSE SPECIALIST

## 2024-05-21 PROCEDURE — 36415 COLL VENOUS BLD VENIPUNCTURE: CPT | Performed by: CLINICAL NURSE SPECIALIST

## 2024-05-21 PROCEDURE — 3077F SYST BP >= 140 MM HG: CPT | Mod: CPTII,S$GLB,, | Performed by: CLINICAL NURSE SPECIALIST

## 2024-05-21 PROCEDURE — 3079F DIAST BP 80-89 MM HG: CPT | Mod: CPTII,S$GLB,, | Performed by: CLINICAL NURSE SPECIALIST

## 2024-05-21 PROCEDURE — 80183 DRUG SCRN QUANT OXCARBAZEPIN: CPT | Performed by: CLINICAL NURSE SPECIALIST

## 2024-05-21 PROCEDURE — 99999 PR PBB SHADOW E&M-EST. PATIENT-LVL IV: CPT | Mod: PBBFAC,,, | Performed by: CLINICAL NURSE SPECIALIST

## 2024-05-21 RX ORDER — POTASSIUM CITRATE 15 MEQ/1
1 TABLET, EXTENDED RELEASE ORAL 2 TIMES DAILY
COMMUNITY

## 2024-05-21 RX ORDER — ASPIRIN 325 MG
50000 TABLET, DELAYED RELEASE (ENTERIC COATED) ORAL
Qty: 12 CAPSULE | Refills: 3 | Status: SHIPPED | OUTPATIENT
Start: 2024-05-21

## 2024-05-21 RX ORDER — OXCARBAZEPINE 600 MG/1
600 TABLET, FILM COATED ORAL 2 TIMES DAILY
Qty: 60 TABLET | Refills: 5 | Status: SHIPPED | OUTPATIENT
Start: 2024-05-21

## 2024-05-21 NOTE — Clinical Note
I think there was talk at some point about doing an LP to eval for NPH. Do you remember if we decided against this? I referred her to Dr. Leon for her cognitive issues. It's pretty global, and Dr. Tracey said in testing last year that she wasn't sure if another etiology was at play. Do you think this was an appropriate referral?

## 2024-05-21 NOTE — PROGRESS NOTES
Subjective:          Patient ID: Deb Figueroa is a 63 y.o. female who presents today for a routine clinic visit for MS.  She was last seen in 2023. The history has been provided by the patient. She is accompanied by her .     MS HPI:  DMT: Kesimpta   Side effects from DMT? No  Taking vitamin D3 as recommended? Not lately   Her trigeminal neuralgia pain is controlled at this time.   Her  reports that her memory has been getting worse. She is very confused. She remembers things from the past, but has very impaired short-term memory.   She has not been doing any exercise or physical therapy.   She sleeps most of the day while her  is at work. She spends most of the day in her bed. She does not transfer. Her  lifts her to transfer. She is able to bathe herself independently once she is in the tub. Her  helps her to get dressed. He inquires about a lift chair to use in the tub.   She denies any recent infections.     Medications:  Current Outpatient Medications   Medication Sig    amlodipine (NORVASC) 5 MG tablet Take 5 mg by mouth once daily.    buPROPion (WELLBUTRIN XL) 150 MG TB24 tablet TAKE ONE TABLET BY MOUTH ONCE A DAY    chlorhexidine (PERIDEX) 0.12 % solution SMARTSI Capful(s) By Mouth Twice Daily    cholecalciferol, vitamin D3, 1,250 mcg (50,000 unit) capsule Take 1 capsule (50,000 Units total) by mouth every 7 days.    ciprofloxacin HCl (CIPRO) 500 MG tablet Take 500 mg by mouth 2 (two) times daily.    escitalopram oxalate (LEXAPRO) 20 MG tablet TAKE 1 TABLET (20 MG TOTAL) BY MOUTH EVERY EVENING.    estradioL (ESTRACE) 0.01 % (0.1 mg/gram) vaginal cream Place 1 g vaginally 3 (three) times a week.    lorazepam (ATIVAN) 2 MG Tab Take 1 tablet (2 mg total) by mouth 3 (three) times daily as needed. (Patient taking differently: Take 2 mg by mouth every evening.)    ofatumumab (KESIMPTA PEN) 20 mg/0.4 mL PnIj INJECT 20MG INTO THE SKIN EVERY 28 DAYS    OXcarbazepine  (TRILEPTAL) 600 MG Tab TAKE ONE TABLET BY MOUTH TWICE A DAY    phenazopyridine (PYRIDIUM) 100 MG tablet Take 100 mg by mouth 3 (three) times daily.    rosuvastatin (CRESTOR) 10 MG tablet Take 10 mg by mouth every evening.     tamsulosin (FLOMAX) 0.4 mg Cap Take 1 capsule by mouth every evening.     No current facility-administered medications for this visit.       SOCIAL HISTORY  Social History     Tobacco Use    Smoking status: Every Day    Smokeless tobacco: Never   Substance Use Topics    Alcohol use: No    Drug use: No       Living arrangements - the patient lives with her      ROS:      5/21/24    REVIEW OF SYMPTOMS   Do you feel abnormally tired on most days? Yes--sleeps a lot    Do you feel you generally sleep well? Yes   Do you have difficulty controlling your bladder?  Yes--she has bladder and bowel incontinence. He inquires about a Purewick. She wears an adult diaper at all times.    Do you have difficulty controlling your bowels?  Yes--fairly rare    Do you have frequent muscle cramps, tightness or spasms in your limbs?  No   Do you have new visual symptoms?  Yes--blurry vision comes and goes; does not wear glasses    Do you have worsening difficulty with your memory or thinking? Yes--very impaired    Do you have worsening symptoms of anxiety or depression?  Yes    For patients who walk, Do you have more difficulty walking?  She does not walk    Have you fallen since your last visit?  Falls sometimes when she tries to get out of bed on her own; falls out of her wheelchair sometimes if she bends over to get something    For patients who use wheelchairs: Do you have any skin wounds or breakdown? No   Do you have difficulty using your hands?  Yes--no trouble feeding herself    Do you have shooting or burning pain? Yes--TN pain controlled    Do you have difficulty with sexual function?  Yes   If you are sexually active, are you using birth control? Y/N  N/A Not Applicable   Do you often choke when  swallowing liquids or solid food?  No   Do you experience worsening symptoms when overheated? Yes--does not like the heat    Do you need any new equipment such as a wheelchair, walker or shower chair? Yes--interested in a bathtub lift    Do you receive co-pay financial assistance for your principal MS medicine? Yes--insurance pays for it    Would you be interested in participating in an MS research trial in the future? Yes   For patients on Gilenya, Tecfidera, Aubagio, Rituxan, Ocrevus, Tysabri, Lemtrada or Methotrexate, are you aware that you should NOT receive live virus vaccines?  Yes   Do you feel you have adequate family/friend support?  Yes   Do you have health insurance?   Yes   Are you currently employed? No   Do you receive SSDI/SSI?  No   Do you use marijuana or cannabis products? No   Have you been diagnosed with a urinary tract infection since your last visit here? No    Have you been diagnosed with a respiratory tract infection since your last visit here? No   Have you been to the emergency room since your last visit here? Yes   Have you been hospitalized since your last visit here?  No            Objective:        Neurologic Exam     Mental Status   Attention: normal. Concentration: normal.   Speech: speech is normal   Level of consciousness: alert  Knowledge: consistent with education.   She looked to her  to answer most questions.      Cranial Nerves     CN V   Right facial sensation deficit: none  Left facial sensation deficit: none    CN VII   Right facial weakness: none  Left facial weakness: none    CN VIII   Hearing: intact    CN IX, X   Palate: symmetric    CN XI   Right sternocleidomastoid strength: normal  Left sternocleidomastoid strength: normal  Right trapezius strength: normal  Left trapezius strength: normal    CN XII   Tongue deviation: none  Mild right EVAN      Motor Exam     Strength   Right biceps: 5/5  Left biceps: 5/5  Right triceps: 5/5  Left triceps: 5/5  Right wrist  flexion: 4/5  Left wrist flexion: 4/5  Right wrist extension: 4/5  Left wrist extension: 4/5  Right interossei: 4/5  Left interossei: 4/5  Right iliopsoas: 3/5  Left iliopsoas: 3/5  Right quadriceps: 5/5  Left quadriceps: 5/5  Right hamstrin/5  Left hamstrin/5  Right anterior tibial: 5/5  Left anterior tibial: 5/5  Right gastroc: 5/5  Left gastroc: 5/5    Sensory Exam   Right arm vibration: normal  Left arm vibration: normal  She does not feel vibration in the bilateral lower extremities      Gait, Coordination, and Reflexes     Gait  Gait: (unable )    Coordination   Finger to nose coordination: abnormal (dysmetria bilaterally )    Reflexes   Right brachioradialis: 2+  Left brachioradialis: 2+  Right biceps: 2+  Left biceps: 2+  Right triceps: 2+  Left triceps: 2+  Right patellar: 3+  Left patellar: 3+        Imaging:     Results for orders placed during the hospital encounter of 23    MRI Brain Demyelinating W W/O Contrast    Impression  Findings compatible with the reported history of multiple sclerosis.  No new or enhancing lesions to indicate ongoing or active demyelination.    Ventriculomegaly out of proportion to the degree of sulcal enlargement.  This is likely related to cerebral volume loss, but the configuration does at least raise the possibility of normal pressure hydrocephalus. Clinical correlation required.      Electronically signed by: Cholo Beasley MD  Date:    2023  Time:    12:37    Results for orders placed during the hospital encounter of 22    MRI Cervical Spine Demyelinating W W/O Contrast    Impression  Findings in the cerebral white matter and cervical/thoracic cord which are typical for multiple sclerosis inter similar in number and distribution compared to prior exam.  No new or enhancing lesions to suggest active disease.    Nonspecific right thyroid nodule measuring 21 mm mildly increased in size but noted dating back to .    Multiple prominent parapelvic  renal cysts again identified.  Hydronephrosis on the left is difficult to exclude CT imaging to be considered if clinically warranted.    Electronically signed by resident: Angelika Ding  Date:    04/07/2022  Time:    10:43    Electronically signed by: Robin Pacheco  Date:    04/07/2022  Time:    15:37    Results for orders placed during the hospital encounter of 04/07/22    MRI Thoracic Spine Demyelinating W W/O Contrast    Impression  Findings in the cerebral white matter and cervical/thoracic cord which are typical for multiple sclerosis inter similar in number and distribution compared to prior exam.  No new or enhancing lesions to suggest active disease.    Nonspecific right thyroid nodule measuring 21 mm mildly increased in size but noted dating back to 2015.    Multiple prominent parapelvic renal cysts again identified.  Hydronephrosis on the left is difficult to exclude CT imaging to be considered if clinically warranted.    Electronically signed by resident: Angelika Ding  Date:    04/07/2022  Time:    10:43    Electronically signed by: Robin Pacheco  Date:    04/07/2022  Time:    15:37        Labs:     Lab Results   Component Value Date    LGEEQFFA69FG 22 (L) 03/30/2023    ZUBMYTAY94PM 19 (L) 06/27/2022    DAQWFOGT61RE 20 (L) 03/06/2020     Lab Results   Component Value Date    JCVINDEX 0.17 07/18/2018    JCVANTIBODY Negative 07/18/2018     Lab Results   Component Value Date    ER6GKPWO 81.7 11/28/2016    ABSOLUTECD3 2375 (H) 11/28/2016    BE5PWMCA 23.7 11/28/2016    ABSOLUTECD8 689 11/28/2016    MW6OLNFF 58.7 (H) 11/28/2016    ABSOLUTECD4 1707 (H) 11/28/2016    LABCD48 2.48 11/28/2016     Lab Results   Component Value Date    WBC 6.78 03/18/2024    RBC 5.24 03/18/2024    HGB 14.6 03/18/2024    HCT 44.4 03/18/2024    MCV 85 03/18/2024    MCH 27.9 03/18/2024    MCHC 32.9 03/18/2024    RDW 13.2 03/18/2024     03/18/2024    MPV 10.5 03/18/2024    GRAN 4.6 03/18/2024    GRAN 68.6 03/18/2024     LYMPH 1.5 03/18/2024    LYMPH 21.5 03/18/2024    MONO 0.4 03/18/2024    MONO 6.3 03/18/2024    EOS 0.2 03/18/2024    BASO 0.05 03/18/2024    EOSINOPHIL 2.8 03/18/2024    BASOPHIL 0.7 03/18/2024     Sodium   Date Value Ref Range Status   03/18/2024 139 136 - 145 mmol/L Final     Potassium   Date Value Ref Range Status   03/18/2024 4.2 3.5 - 5.1 mmol/L Final     Chloride   Date Value Ref Range Status   03/18/2024 103 95 - 110 mmol/L Final     CO2   Date Value Ref Range Status   03/18/2024 28 23 - 29 mmol/L Final     Glucose   Date Value Ref Range Status   03/18/2024 116 (H) 70 - 110 mg/dL Final     BUN   Date Value Ref Range Status   03/18/2024 12 8 - 23 mg/dL Final     Creatinine   Date Value Ref Range Status   03/18/2024 0.9 0.5 - 1.4 mg/dL Final     Calcium   Date Value Ref Range Status   03/18/2024 9.9 8.7 - 10.5 mg/dL Final     Total Protein   Date Value Ref Range Status   03/18/2024 7.9 6.0 - 8.4 g/dL Final     Albumin   Date Value Ref Range Status   03/18/2024 4.2 3.5 - 5.2 g/dL Final     Total Bilirubin   Date Value Ref Range Status   03/18/2024 0.2 0.1 - 1.0 mg/dL Final     Comment:     For infants and newborns, interpretation of results should be based  on gestational age, weight and in agreement with clinical  observations.    Premature Infant recommended reference ranges:  Up to 24 hours.............<8.0 mg/dL  Up to 48 hours............<12.0 mg/dL  3-5 days..................<15.0 mg/dL  6-29 days.................<15.0 mg/dL       Alkaline Phosphatase   Date Value Ref Range Status   03/18/2024 115 55 - 135 U/L Final     AST   Date Value Ref Range Status   03/18/2024 19 10 - 40 U/L Final     ALT   Date Value Ref Range Status   03/18/2024 13 10 - 44 U/L Final     Anion Gap   Date Value Ref Range Status   03/18/2024 8 8 - 16 mmol/L Final     eGFR if    Date Value Ref Range Status   08/19/2021 >60 >60 mL/min/1.73 m^2 Final     eGFR if non    Date Value Ref Range Status    08/19/2021 >60 >60 mL/min/1.73 m^2 Final     Comment:     Calculation used to obtain the estimated glomerular filtration  rate (eGFR) is the CKD-EPI equation.        Lab Results   Component Value Date    HEPBSAG Non-reactive 03/18/2024    HEPBSAB <3.00 03/30/2023    HEPBSAB Non-reactive 03/30/2023    HEPBCAB Non-reactive 03/18/2024           MS Impression and Plan:     NEURO MULTIPLE SCLEROSIS IMPRESSION:   Clinical Progression:  Clinically Stable  Clinical Progression comment:  Physically, she is stable.  Strength in her legs actually seems improved--normal in all groups, except for weakness in bilateral hip flexors (although these are still stronger than last visit). She is quite cognitively impaired, though, and looks to her  to answer most questions. Referral placed to Dr. Leon for evaluation in memory clinic.   MRI Progression comment:  Stable MRI from March 2023   DMT:  No change in management  DMT comment:  Continue Kesimpta. Safety labs are due in September. She is aware of the risks associated with immunosuppressant therapy, including increased risk of infection.     Symptom Management:  Implement change in symptom management  Implement Change in Symptom Management:  Pain (Will check oxcarbazepine level today and refill oxcarbazepine Rx.  Discussed getting a tub lift chair--getting her in and out of the tub is the most difficult transfer for her .)     Referral placed to [facility] for PT to focus on overall stretching and strengthening.   She will follow up with Dr. Zimmer in 6 months.   The visit today is associated with current or anticipated ongoing medical care related to this patient's single serious condition/complex condition of multiple sclerosis.    MRI brain this summer     Total time spent with patient: 39 minutes   Total time spent on encounter: 50 minutes         JANES Jiang, CNS    Problem List Items Addressed This Visit       High risk medication use     Other Visit  Diagnoses       Multiple sclerosis    -  Primary    Relevant Orders    CBC Auto Differential    Comprehensive Metabolic Panel    Hepatitis B Core Antibody, Total    Hepatitis B Surface Antigen    Immunoglobulins (IgG, IgA, IgM) Quantitative    Vitamin D    MRI Brain Demyelinating Without Contrast    Oxcarbazepine level    MRI Brain Demyelinating W W/O Contrast    Ambulatory referral/consult to Neurology    Ambulatory referral/consult to Physical/Occupational Therapy    Vitamin D deficiency        Relevant Medications    cholecalciferol, vitamin D3, 1,250 mcg (50,000 unit) capsule    Trigeminal neuralgia of left side of face        Relevant Medications    OXcarbazepine (TRILEPTAL) 600 MG Tab    Cognitive dysfunction accompanying multiple sclerosis        Relevant Orders    Ambulatory referral/consult to Neurology    Impaired mobility and ADLs        Relevant Orders    Ambulatory referral/consult to Physical/Occupational Therapy    Counseling regarding goals of care        Prophylactic immunotherapy

## 2024-05-21 NOTE — Clinical Note
She and her  are interested in a lift chair for the tub. I can send you a link if needed to show you what I mean. Do you think the [employer] Society would help with this?

## 2024-05-22 ENCOUNTER — TELEPHONE (OUTPATIENT)
Dept: NEUROLOGY | Facility: CLINIC | Age: 64
End: 2024-05-22
Payer: COMMERCIAL

## 2024-05-23 ENCOUNTER — DOCUMENTATION ONLY (OUTPATIENT)
Dept: NEUROLOGY | Facility: CLINIC | Age: 64
End: 2024-05-23
Payer: COMMERCIAL

## 2024-05-23 LAB — OXCARBAZEPINE METABOLITE: 16 MCG/ML (ref 10–35)

## 2024-05-30 ENCOUNTER — TELEPHONE (OUTPATIENT)
Dept: PSYCHIATRY | Facility: CLINIC | Age: 64
End: 2024-05-30
Payer: COMMERCIAL

## 2024-05-30 NOTE — TELEPHONE ENCOUNTER
SW called Pt to discuss referral to [employer] for financial assistance with power bathtub lift. Pt's  answered the phone, stated Pt was sleeping, and said that Pt could return call later. CHRISTIANO gave Pt's  direct office line.      [employer] Power Bathtub Lift BL[employer] ([email or web address])   
Detail Level: Zone
Recommendation Preamble: The following recommendations were made during the visit: [employer]

## 2024-06-10 ENCOUNTER — DOCUMENTATION ONLY (OUTPATIENT)
Dept: NEUROLOGY | Facility: CLINIC | Age: 64
End: 2024-06-10
Payer: COMMERCIAL

## 2024-07-15 ENCOUNTER — TELEPHONE (OUTPATIENT)
Dept: NEUROLOGY | Facility: CLINIC | Age: 64
End: 2024-07-15
Payer: COMMERCIAL

## 2024-07-16 ENCOUNTER — HOSPITAL ENCOUNTER (OUTPATIENT)
Dept: RADIOLOGY | Facility: HOSPITAL | Age: 64
Discharge: HOME | End: 2024-07-16
Attending: CLINICAL NURSE SPECIALIST
Payer: COMMERCIAL

## 2024-07-16 DIAGNOSIS — G35 MULTIPLE SCLEROSIS: ICD-10-CM

## 2024-07-16 PROCEDURE — 70553 MRI BRAIN STEM W/O & W/DYE: CPT | Mod: 26,,, | Performed by: RADIOLOGY

## 2024-07-16 PROCEDURE — 70553 MRI BRAIN STEM W/O & W/DYE: CPT | Mod: TC

## 2024-07-16 PROCEDURE — 25500020 PHARM REV CODE 255: Performed by: CLINICAL NURSE SPECIALIST

## 2024-07-16 PROCEDURE — A9585 GADOBUTROL INJECTION: HCPCS | Performed by: CLINICAL NURSE SPECIALIST

## 2024-07-16 RX ORDER — GADOBUTROL 604.72 MG/ML
7 INJECTION INTRAVENOUS
Status: COMPLETED | OUTPATIENT
Start: 2024-07-16 | End: 2024-07-16

## 2024-07-16 RX ADMIN — GADOBUTROL 7 ML: 604.72 INJECTION INTRAVENOUS at 02:07

## 2024-07-19 ENCOUNTER — TELEPHONE (OUTPATIENT)
Dept: NEUROLOGY | Facility: CLINIC | Age: 64
End: 2024-07-19
Payer: COMMERCIAL

## 2024-07-19 NOTE — TELEPHONE ENCOUNTER
Spoke with patient's spouse to coordinate scheduling appointments with [facility].    Educated patient' spouse about clinic operations and informed that patient will see a NP for initial/pre-visit then see MD afterwards.    Unfortunately, spouse is not tech efficient to perform virtual appointment and would rather be seen in-person instead.    Patient is scheduled:  In-Person with Dr. Leon on 8/14/2024

## 2024-07-25 ENCOUNTER — PATIENT MESSAGE (OUTPATIENT)
Dept: PSYCHIATRY | Facility: CLINIC | Age: 64
End: 2024-07-25
Payer: COMMERCIAL

## 2024-08-01 DIAGNOSIS — D84.9 IMMUNOSUPPRESSION: ICD-10-CM

## 2024-08-01 DIAGNOSIS — G35 MULTIPLE SCLEROSIS: ICD-10-CM

## 2024-08-01 RX ORDER — OFATUMUMAB 20 MG/.4ML
INJECTION, SOLUTION SUBCUTANEOUS
Qty: 1.2 ML | Refills: 0 | Status: SHIPPED | OUTPATIENT
Start: 2024-08-01

## 2024-08-05 ENCOUNTER — PATIENT MESSAGE (OUTPATIENT)
Dept: PSYCHIATRY | Facility: CLINIC | Age: 64
End: 2024-08-05
Payer: COMMERCIAL

## 2024-08-12 ENCOUNTER — OUTPATIENT CASE MANAGEMENT (OUTPATIENT)
Dept: NEUROLOGY | Facility: CLINIC | Age: 64
End: 2024-08-12
Payer: COMMERCIAL

## 2024-08-12 DIAGNOSIS — R46.89 COGNITIVE AND BEHAVIORAL CHANGES: Primary | ICD-10-CM

## 2024-08-12 DIAGNOSIS — R41.89 COGNITIVE AND BEHAVIORAL CHANGES: Primary | ICD-10-CM

## 2024-08-12 PROCEDURE — 99358 PROLONG SERVICE W/O CONTACT: CPT | Mod: S$GLB,,, | Performed by: PSYCHIATRY & NEUROLOGY
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
## 2024-08-14 ENCOUNTER — OFFICE VISIT (OUTPATIENT)
Dept: NEUROLOGY | Facility: CLINIC | Age: 64
End: 2024-08-14
Payer: COMMERCIAL

## 2024-08-14 ENCOUNTER — LAB VISIT (OUTPATIENT)
Dept: LAB | Facility: HOSPITAL | Age: 64
End: 2024-08-14
Attending: PSYCHIATRY & NEUROLOGY
Payer: COMMERCIAL

## 2024-08-14 VITALS
SYSTOLIC BLOOD PRESSURE: 153 MMHG | HEART RATE: 77 BPM | HEIGHT: 65 IN | DIASTOLIC BLOOD PRESSURE: 95 MMHG | BODY MASS INDEX: 23.44 KG/M2

## 2024-08-14 DIAGNOSIS — G30.9 MIXED DEMENTIA: Primary | ICD-10-CM

## 2024-08-14 DIAGNOSIS — G35 COGNITIVE DYSFUNCTION ACCOMPANYING MULTIPLE SCLEROSIS: ICD-10-CM

## 2024-08-14 DIAGNOSIS — F02.80 MIXED DEMENTIA: Primary | ICD-10-CM

## 2024-08-14 DIAGNOSIS — F01.50 MIXED DEMENTIA: ICD-10-CM

## 2024-08-14 DIAGNOSIS — G30.9 MIXED DEMENTIA: ICD-10-CM

## 2024-08-14 DIAGNOSIS — F09 COGNITIVE DYSFUNCTION ACCOMPANYING MULTIPLE SCLEROSIS: ICD-10-CM

## 2024-08-14 DIAGNOSIS — I69.319 COGNITIVE DEFICIT AS LATE EFFECT OF CEREBROVASCULAR ACCIDENT (CVA): ICD-10-CM

## 2024-08-14 DIAGNOSIS — G47.01 INSOMNIA DUE TO MEDICAL CONDITION: ICD-10-CM

## 2024-08-14 DIAGNOSIS — G20.C PARKINSONISM, UNSPECIFIED PARKINSONISM TYPE: ICD-10-CM

## 2024-08-14 DIAGNOSIS — G47.33 OSA (OBSTRUCTIVE SLEEP APNEA): ICD-10-CM

## 2024-08-14 DIAGNOSIS — G82.20 PARAPLEGIA: ICD-10-CM

## 2024-08-14 DIAGNOSIS — G35 MULTIPLE SCLEROSIS: ICD-10-CM

## 2024-08-14 DIAGNOSIS — F01.50 MIXED DEMENTIA: Primary | ICD-10-CM

## 2024-08-14 DIAGNOSIS — F02.80 MIXED DEMENTIA: ICD-10-CM

## 2024-08-14 DIAGNOSIS — F02.84 MAJOR NEUROCOGNITIVE DISORDER DUE TO MULTIPLE ETIOLOGIES, WITH ANXIETY: ICD-10-CM

## 2024-08-14 PROCEDURE — 96132 NRPSYC TST EVAL PHYS/QHP 1ST: CPT | Mod: 59,S$GLB,, | Performed by: PSYCHIATRY & NEUROLOGY

## 2024-08-14 PROCEDURE — 3080F DIAST BP >= 90 MM HG: CPT | Mod: CPTII,S$GLB,, | Performed by: PSYCHIATRY & NEUROLOGY

## 2024-08-14 PROCEDURE — 1159F MED LIST DOCD IN RCRD: CPT | Mod: CPTII,S$GLB,, | Performed by: PSYCHIATRY & NEUROLOGY

## 2024-08-14 PROCEDURE — 83921 ORGANIC ACID SINGLE QUANT: CPT | Performed by: PSYCHIATRY & NEUROLOGY

## 2024-08-14 PROCEDURE — 99417 PROLNG OP E/M EACH 15 MIN: CPT | Mod: S$GLB,,, | Performed by: PSYCHIATRY & NEUROLOGY

## 2024-08-14 PROCEDURE — 99999 PR PBB SHADOW E&M-EST. PATIENT-LVL IV: CPT | Mod: PBBFAC,,, | Performed by: PSYCHIATRY & NEUROLOGY

## 2024-08-14 PROCEDURE — 82607 VITAMIN B-12: CPT | Performed by: PSYCHIATRY & NEUROLOGY

## 2024-08-14 PROCEDURE — 3008F BODY MASS INDEX DOCD: CPT | Mod: CPTII,S$GLB,, | Performed by: PSYCHIATRY & NEUROLOGY

## 2024-08-14 PROCEDURE — 99215 OFFICE O/P EST HI 40 MIN: CPT | Mod: S$GLB,,, | Performed by: PSYCHIATRY & NEUROLOGY

## 2024-08-14 PROCEDURE — 36415 COLL VENOUS BLD VENIPUNCTURE: CPT | Performed by: PSYCHIATRY & NEUROLOGY

## 2024-08-14 PROCEDURE — 3077F SYST BP >= 140 MM HG: CPT | Mod: CPTII,S$GLB,, | Performed by: PSYCHIATRY & NEUROLOGY

## 2024-08-14 PROCEDURE — 96116 NUBHVL XM PHYS/QHP 1ST HR: CPT | Mod: 59,S$GLB,, | Performed by: PSYCHIATRY & NEUROLOGY

## 2024-08-14 RX ORDER — RAMELTEON 8 MG/1
8 TABLET ORAL NIGHTLY
Qty: 30 TABLET | Refills: 3 | Status: SHIPPED | OUTPATIENT
Start: 2024-08-14

## 2024-08-14 RX ORDER — MODAFINIL 100 MG/1
TABLET ORAL
Qty: 60 TABLET | Refills: 1 | Status: SHIPPED | OUTPATIENT
Start: 2024-08-14

## 2024-08-14 NOTE — PROGRESS NOTES
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 suggest a loss of manners or decorum.  She does not have apathy and/or decreased motivation.  She does appear to have had a change in behavioral/emotional inertia.  The patient's emotional expression has changed.  She does have emotional blunting or lability.  She does not have symptoms of irritability and mood lability.  She does not have symptoms of agitation, aggression, or violent outbursts.  Her insight into his health and situation is intact.  Her personal hygiene is intact.  She is not exhibiting a diminished response to other people's needs and feelings.  She is not exhibiting a diminished social interest, interrelatedness, or personal warmth.  She denies restlessness.  She denies new and/or worsening simple repetitive behaviors.  Her speech has not become simplified or become repetitive/stereotyped.  She denies new/worsening complex repetitive/ritualistic compulsions and behaviors.  She does not have symptoms of hyper-religiosity or dogmatism.  Her interests/pleasures have not become restrictive, simplified, interrupting, or repetitive.  She  denies a change of self-stimulating behavior.  She denies any changes in eating behavior.  She denies increased consumption of food or substances.  She denies oral exploration or consumption of inedible objects.  Psychiatric:   She does feel depressed.  She is exhibiting symptoms of social withdrawal/indifference.  She denies anxiety.  She does not exhibit cycling behavior.  She does not exhibit hyperactive behavior.  She is not exhibited symptoms of paranoia.  She does not have delusions.  She does not have hallucinations.  She does not have a history of sensitivity to neuroleptic/psychotropic medications.  Medical Review of Systems:   The patient does not have constipation.  The patient does not have urinary incontinence.  The patient reports symptoms that are suggestive of orthostatic lightheadedness.  The patient's weight is stable.  Functional status:  Difficulty performing the following Instrumental ADLs:  Household chores: Yes  Food Preparation: Yes  Shopping: Yes  Ability to Handle Finances: Yes  Transportation/Driving: Yes  Household Appliances/Stove: Yes  Laundry: Yes  Difficulty performing the following Basic ADLs:  Dressing: Yes  Bathing: No  Toileting: No  Personal hygiene and grooming: No  Feeding: No  Care Management:  Patient/Family Safety Concerns:  Medication Adherence: No  Home Safety: No  Wandered: No  Firearms: No  Fall Risk: No  Home Alone: No        Past Medical History:   Diagnosis Date    Hypertension     MS (multiple sclerosis)     Nephrolithiasis        Past Surgical History:   Procedure Laterality Date    LITHOTRIPSY  2016    Large renal stone.       Family History   Problem Relation Name Age of Onset    Cancer Father          prostate    Hypertension Mother      Breast cancer Neg Hx      Ovarian cancer Neg Hx      Colon cancer Neg Hx         Social History     Socioeconomic History    Marital status:    Tobacco Use    Smoking status: Every Day     Types: Cigarettes    Smokeless  tobacco: Never   Substance and Sexual Activity    Alcohol use: No    Drug use: No    Sexual activity: Yes     Partners: Male     Comment:        Medication:     Current Outpatient Medications on File Prior to Visit   Medication Sig Dispense Refill    amlodipine (NORVASC) 5 MG tablet Take 5 mg by mouth once daily.      buPROPion (WELLBUTRIN XL) 150 MG TB24 tablet TAKE ONE TABLET BY MOUTH ONCE A DAY (Patient not taking: Reported on 5/21/2024) 30 tablet 10    cholecalciferol, vitamin D3, 1,250 mcg (50,000 unit) capsule Take 1 capsule (50,000 Units total) by mouth every 7 days. 12 capsule 3    escitalopram oxalate (LEXAPRO) 20 MG tablet TAKE 1 TABLET (20 MG TOTAL) BY MOUTH EVERY EVENING. 30 tablet 3    estradioL (ESTRACE) 0.01 % (0.1 mg/gram) vaginal cream Place 1 g vaginally 3 (three) times a week.      lorazepam (ATIVAN) 2 MG Tab Take 1 tablet (2 mg total) by mouth 3 (three) times daily as needed. (Patient taking differently: Take 2 mg by mouth every evening.) 90 tablet 0    ofatumumab (KESIMPTA PEN) 20 mg/0.4 mL PnIj INJECT 20MG INTO THE SKIN EVERY 28 DAYS 1.2 mL 0    OXcarbazepine (TRILEPTAL) 600 MG Tab Take 1 tablet (600 mg total) by mouth 2 (two) times daily. 60 tablet 5    potassium citrate (UROCIT-K 15) 15 mEq TbSR Take 1 tablet by mouth 2 (two) times daily.      rosuvastatin (CRESTOR) 10 MG tablet Take 10 mg by mouth every evening.   11     No current facility-administered medications on file prior to visit.        Review of patient's allergies indicates:   Allergen Reactions    Vancomycin analogues Itching       Medications Reconciliation:   I have reconciled the patient's home medications and discharge medications with the patient/family. I have updated all changes.  Refer to After-Visit Medication List.    Objective:  Vital Signs:  Vitals:    08/14/24 1429   BP: (!) 153/95   Pulse: 77     Wt Readings from Last 3 Encounters:   03/30/23 1301 63.9 kg (140 lb 14 oz)   08/08/22 1247 66.4 kg (146 lb 7.2 oz)    07/06/22 1319 82.9 kg (182 lb 12.2 oz)     Body mass index is 23.44 kg/m².           Neurological examination:  Mental Status:   Her appearance deviates from typical expectations given their age and context. Comment: looks older than stated age;  Throughout the interview, she is cooperative, her eye contact is appropriate.  Her behavior is appropriate to the clinical context without impropriety or improper language/conduct.  Her behavior was not characterized by episodes of sudden uncontrollable and inappropriate laughing or crying.  The patient's energy level is abnormal. Comment: Hypoactive;  Her orientation is not entirely accurate.  Her attention/concentration is impaired.  She can complete three-step commands.  Her fund of knowledge was less than what would be expected given age, culture and level of education.  Her thought process is not logical or goal-oriented. Comment: though blocking;  She demonstrated impaired insight based on actions, awareness of her illness, plans for the future.  She demonstrated good judgment based on actions and plans for the future.  She has no evidence of hallucinations (auditory, visual, olfactory).  She has evidence of delusions.  Cranial Nerves:   Her pupils were normal.  Her visual fields were full to confrontation in all quadrants.  Her ocular pursuit in the horizontal and vertical plane was complete.  Her saccadic initiation, velocity, and amplitude are normal.  Her blink rate was abnormal. Comment: decreased;  Her facial strength was normal.  Her facial expression was abnormal. Comment: Hypomimia;  Her hearing was normal bilaterally.  Her oropharynx and soft palate appeared abnormal. Comment: mallampati 3;  Her tongue showed no evidence of scalloping.  She tongue movement with normal.  She had no significant evidence of anterocollis or retrocollis.  Speech/Language:   The patient's speech was not completely fluent and non-effortful.  Her speech timbre is abnormal.  Comment: quiet;  Her respirations are within normal range and appropriate to context.  She has no articulation (segmental features) errors.  She made prosody (suprasegmental features) errors.  The patient's speech is dysarthric.  The patient showed evidence of anomia. Comment: Mod;  She showed evidence of anomia during spontaneous speech.  She showed evidence of anomia during confrontational naming. Comment: Mod;  She makes phonological loop errors.  She can comprehend commands that cross the midline (e.g., with your left thumb, touch your right ear).  She has difficulty comprehending syntactically complex sentences.  Motor:   The patient's bilateral upper extremity muscle bulk is appropriate.  The patient's upper extremity muscle tone is increased. Comment: B/L, Mod;  The patient's bilateral upper extremity muscle tone does not suggest spasticity. Comment: Mild;  The patient's bilateral upper extremity muscle tone does not suggest rigidity/cogwheeling.  There is evidence of paratonia. Comment: B/L, R>L, Mod; Muscle tone is increased and there is evidence of paratonia.  Assessment of motor strength was symmetric and at minimal anti-gravity.  There is no pronator or downward drift.  Coordination:   She showed evidence of upper extremity limb dysmetria during past pointing on finger-nose-finger.  She has no limb dysdiadochokinesia of the upper extremity on the pronation/supination test and screwing in a light bulb or lower extremity during tapping ball of each foot bilaterally.  She has no visible tremor.  She has no evidence of interhemispheric motor control deficits.  She has no motor overflow bilaterally.  She has no akathisia.  The patient's upper extremity fine motor coordination was abnormal. Comment: B/L, Mild;  The patient's upper extremity fine motor coordination was not slow. Comment: finger tapping, pronation/supination, and the open-close fist was slow.; finger tapping, pronation/supination, and the  open-close fist was slow.  The patient's upper extremity fine motor coordination was not hypometric. Comment: finger tapping, pronation/supination, and the open-close fist showed hypometria.; finger tapping, pronation/supination, and the open-close fist showed hypometria.  The patient's upper extremity fine motor coordination was not dysrhythmic. Comment: finger tapping, pronation/supination, and the open-close fist showed dysrhythmia.; finger tapping, pronation/supination, and the open-close fist showed dysrhythmia.  Higher Cortical Function:   The patient showed evidence of visuospatial constructional dysfunction.  The patient showed evidence of apraxia.  Sensory:   Her sensation was diminished to light touch, and vibratory sense. Comment: in the bilateral upper and lower extremities in a length-dependant pattern.; in the bilateral upper and lower extremities in a length-dependant pattern.  Gait:   She has normal posture sitting unaided.  She is unable to rise from a chair and sit back down without using their arms. Comment: unable to walk; unable to walk  Neuropsychological Evaluation Summary:  Prior Neurocognitive/Neurobehavioral Evaluation(s)  No Prior Testing Available  Neurocognitive/Neurobehavioral Evaluation completed on 2024-08-14    Memory    Registration-3 3/3 Within Normal Limits.   Recall-3 1/3 Impairment: Significant.   Recall-5 1/5 Impairment: Significant.   Registration-5 0/0     Executive    Three-step command 3/3 Within Normal Limits.   Trials-1 0/1 Impairment: Significant.   WORLD Backward 5/5 Within Normal Limits.   Digit Span - 2 1/2 Impairment: Moderate.   Serial Sevens 0/3 Impairment: Significant.   Fluency 0/1 Impairment: Significant.   Digit Span Backwards 3 Impairment: -1.8 STDs below the average score based on age and education.   Lexical Fluency - F 7 Impairment: -2.1 STDs below the average score based on age and education.   Lexical Fluency - A 6 Impairment: -2.3 STDs below the average  score based on age and education.   Lexical Fluency - S 5 Impairment: -2.6 STDs below the average score based on age and education.   Visuospatial    Intersecting Pentagons 0/1 Impairment: Significant.   3D Cube Copy 0/1 Impairment: Significant.   Clock Draw 0/3 Impairment: Significant.   [place] Copy 5/17 Impairment: Significant.   Attention    Orientation-10 4/10 Impairment: Significant.   Orientation-6 3/6 Impairment: Moderate.   Alternating Sequence 1/1 Within Normal Limits.   Digit Span Forwards 4 Impairment: -3.6 STDs below the average score based on age and education.   Language    Repetition-1 1/1 Within Normal Limits.   Naming-2 2/2 Within Normal Limits.   Following written command 1/1 Within Normal Limits.   Writing a complete sentence 0/1 Impairment: Significant.   Naming-3 1/3 Impairment: Significant.   Repetition-2 2/2 Within Normal Limits.   Abstraction 2/2 Within Normal Limits.   Neurocognitive Focused Evaluation Aggregate Score(s)    MMSE 20/30 MMSE Score suggestive of moderate cognitive impairment.   MOCA 11/30 MOCA Score suggestive of moderate cognitive impairment.   Neuropsychiatric/Behavioral Focused Evaluation Assessment    BEHAV5+ 2/6 See ROS section for a full description   Laboratories:     Lab Date Value [Reference]   Metabolic Screening           Bilirubin Direct 2022, Jan-13    0.1 [0.1 - 0.3 mg/dL]      Glucose 2022, Feb-21    116 (H) [70 - 110 mg/dL]      Albumin 2022, Feb-21    4.2 [3.5 - 5.2 g/dL]      ALT 2022, Feb-21    13 [10 - 44 U/L]      AST 2022, Feb-21    19 [10 - 40 U/L]      BILIRUBIN TOTAL 2022, Feb-21    0.2 [0.1 - 1.0 mg/dL]      PROTEIN TOTAL 2022, Feb-21    7.9 [6.0 - 8.4 g/dL]      Infectious Disease/Immunocompromised Screening   Hep B Core Total Ab 2022, Feb-21    Non-reactive [Non-reactive ]      Hepatitis B Surface Ag 2022, Feb-21    Negative [Non-reactive ]      Cerebrospinal Fluid Assessment   IgG 2022, Feb-21    1248 [650 - 1600 mg/dL]      Medication Levels    OXCARBAZEPINE 2022, Jan-13    27 [10 - 35 mcg/mL]      Standard Hematology Screen   Hematocrit 2022, Feb-21    44.4 [37.0 - 48.5 %]      Hemoglobin 2022, Feb-21    14.6 [12.0 - 16.0 g/dL]      MCV 2022, Feb-21    85 [82 - 98 fL]      Neuroendocrine/Electrolyte Screening   BUN 2022, Feb-21    12 [8 - 23 mg/dL]      Chloride 2022, Feb-21    103 [95 - 110 mmol/L]      Creatinine 2022, Feb-21    0.9 [0.5 - 1.4 mg/dL]      Potassium 2022, Feb-21    4.2 [3.5 - 5.1 mmol/L]      Sodium 2022, Feb-21    139 [136 - 145 mmol/L]      Delirium Screening   Bacteria, UA 2022, Feb-21    Few ! [None-Occ /hpf]      Glucose, UA 2022, Feb-21    Negative      Ketones, UA 2022, Feb-21    Negative      NITRITE UA 2022, Feb-21    Negative      Protein, UA 2022, Feb-21    Trace !      RBC, UA 2022, Feb-21    35 (H) [0 - 4 /hpf]      WBC, UA 2022, Feb-21    5 [0 - 5 /hpf]           Neuroimaging:    MRI brain/head without contrast on 7/16/2024  Formal interpretation by Radiology:  No definite new or enhancing lesion to specifically indicate ongoing or active demyelination.  Independently reviewed radiological imaging by Logan Winkler MD. MPH. Behavioral Neurologist  T1: Mild generalized cortical atrophy commensurate with the degree of white matter changes with regional cortical volume loss of the anterior cingulate occipital lobe with regional sulci widening. Severely thin corpus callosum. Intake midbrain and brainstem with volume ratio. severe left greater than right hippocampal volume loss with widening of the left greater than right anterior operculum with regional cortical volume loss left anterior temporal pole. No clear frontotemporal degeneration.  T2/FLAIR: Severe scattered subcortical white matter hyperintensities with bilateral left greater than right hippocampal hyperintensities multifocal largely confluent periventricular left greater than right hyperintensities adjacent to the periventricular territory extending up the  centrum semiovale diffusely throughout the bilateral corona radiata.  DWI/ADC: No Significant DWI hyperintensities/hypointensities. No ADC correlation.  SWI/GRE: No Significant hypointensities to suggest cortical/subcortical hemosiderin deposition.  Impression: : Mild generalized cortical atrophy regional atrophy most well appreciated the anterior cingulate occipital lobe with left greater than right anterior opercular widening left greater than right anterior temporal atrophy and left greater than right hippocampal volume loss. Severe confluent juxtacortical and juxta periventricular white matter hyperintensities consistent with patient's history of multiple sclerosis. 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     Procedures:    Electrocardiogram on 9/10/2020  Formal interpretation:  Vent. Rate : 077 BPM Atrial Rate : 077 BPM P-R Int : 144 ms QRS Dur : 078 ms QT Int : 400 ms P-R-T Axes : 072 080 063 degrees QTc Int : 452 ms Normal sinus rhythm Normal ECG  Independently reviewed Electrocardiogram by Logan Winkler MD. MPH. Behavioral Neurologist  Impression: : Received ECG has no evidence of sinus node disease. HR (>=50-60). Prolonged MA interval (>0.22 s). Broad QRS complex (> 0.12 s).     Clinical Summary:     The patient is a 63-year-old right-handed female with a relevant past medical history of MS, MDD, HLD, Insomnia, XUAN, HTN, Trigeminal neuralgia,, who presents reporting a 3-year history of gradually progressive neurocognitive impairment.       The clinical history is suggestive of:  Memory Impairment: STM encoding impairment, LTM encoding-retrieval impairment, Amnesia of fixation  Attention Impairment: Attention, Alertness, Selective attention, Sustained attention, Shifting attention  Executive Impairment: Energization, Working Memory  Language Impairment: Language Dysfunction  Visuospatial Impairment: Allocentric Spatial Processing, Cortical Vision Impairment  Motor/Coordination Impairment: Sensory  motor integration, Motor weakness  Behavior Impairment: Response Inhibition, Neurovegetative, Emotional Regulation  Psychiatric Impairment: Social Coherence  Medical Review of Systems Impairment: Autonomic Dysfunction  iADL Impairment: [place] Instrumental Activities of Daily Living Scale  The neurological examination is significant for:  Cerebellar Dysfunction: dysmetria UE  Cortical Frontal Dysfunction: non-fluent aphasia (fluency), agrammatic aphasia (syntactically complex comprehension)  Cortical Temporal Dysfunction: anomic aphasia (spontaneous, confrontational)  Executive Impairment: thought disorder  Movement Disorder (Gait): strength (difficulty rising)  Movement Disorder (Hypokinetic): parkinsonism (diminished facial expression, bradykinesia), paratonia (tone), dyskinesia (slowing, hypometria, dysrhythmia)  Movement Disorder (Ocular): eyelid apraxia  Movement Disorder (Speech): abnormal vocal features (volume, prosody)  Sensory Dysfunction: peripheral (A? fibers)  The neurocognitive battery is significant (based on age and education) for:  Executive predominant multidomain major cognitive impairment  Severe Memory Impairment: recall.  Severe Visuospatial Impairment: visuospatial construction.  Severe Language Impairment: writing, naming.  Moderate Attention Impairment: She scored >3 standard deviations below the norm on at least one measure. She had difficulty with verbal short term memory, verbal short term memory.  Moderate Executive Impairment: She scored >2 standard deviations below the norm on at least one measure. She had difficulty with lexical fluency, working memory.  MMSE 20/30: MMSE Score suggestive of moderate cognitive impairment.  MOCA 11/30: MOCA Score suggestive of moderate cognitive impairment.  BEHAV5+ 2/6: See ROS section for a full description  The neurologically relevant imaging is significant for  MRI brain/head without contrast (7/16/2024): Mild generalized cortical atrophy regional  atrophy most well appreciated the anterior cingulate occipital lobe with left greater than right anterior opercular widening left greater than right anterior temporal atrophy and left greater than right hippocampal volume loss. Severe confluent juxtacortical and juxta periventricular white matter hyperintensities consistent with patient's history of multiple sclerosis. 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     The patient's clinical presentation meets the criteria for Dementia (Sebastian, et al. 2011 Alzheimer's & Dementia).     Concern regarding an intraindividual change in cognition diagnosed through a combination of history and objective cognitive assessment  Impairment in two or more cognitive domains  Interference with independence in functional abilities.  Represents a decline from previous levels of functioning.  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 While the presentation is overall suggestive of Late-Onset Alzheimer's Dementia (LOAD) as described by Sebastian et al. (2011), the patient does not fully meet the criteria due to the presence of leukoencephalopathy. The patient meets the criteria for dementia, characterized by executive dysfunction, including impaired reasoning, judgment, and problem-solving, along with deficits in other cognitive domains. 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 The patient also exhibits Capgras delusions and significant visuospatial deficits. However, there is no evidence of a recent neurological lesion or overt parkinsonism or hallucinations, which would strongly  suggest a Lewy body disease-predominant disorder.  At present, all neurodegenerative diseases can only be diagnosed with 100% certainty through a brain autopsy. The suspected neuropathology underlying the patient's neurocognitive impairment is likely a mixture of pathologies (Alzheimer's Disease Related Pathology, Vascular Contributions to Cognitive Impairment and Dementia).  There are no plasma protein biomarkers available on record.  There are no CSF protein biomarkers available on record.  There are no dermatological protein biomarkers available on record.  There is no relevant genetic biomarkers available on record.     The patient presents with a minimum four-year history of gradually progressive, executive-predominant, multi-domain major cognitive impairment that has affected both instrumental and basic activities of daily living. The patient has a longstanding history of presumed multiple sclerosis dating back to the 1980s. However, this diagnosis is somewhat complicated by the presence of early hypotonia and white matter hyperintensities, which are more suggestive of leukoencephalopathy than progressive multiple sclerosis. Despite being on multiple disease-modifying therapies over the past 40 years, there has been minimal impact on disease progression.Over the last four years, in the context of Wellbutrin use following a panic disorder and worsening behavioral changes, the patient has experienced increasing cognitive impairment, now accompanied by delusions and severe visuospatial deficits. Since discontinuing Wellbutrin, cognitive impairment has modestly improved, and the behavioral changes have become less exaggerated. However, the patient continues to experience sundowning, Capgras delusions, and fluctuating arousal levels. There is a high probability of mixed pathology neurodegeneration, with possible leukoencephalopathy and Alzheimer's disease. There are no overt signs of parkinsonism, lowering the  likelihood of a comorbid alpha-synuclein process.We recommend serum laboratories and a skin biopsy to confirm degenerative pathology. The primary concerns from the family are the Capgras delusions, for which we provided reading material. We also recommend stabilizing the circadian rhythm and maintaining daytime alertness. To achieve this, we suggest restarting the daytime stimulant modafinil and initiating ramelteon in the evening while we work to confirm the underlying etiology.     The observations made above, were discussed with the patient and their supporting historian(s) (). We have discussed the additional diagnostic(s) and/or managenent below. Prior Authorization Statement(s) Suvorexant (as well as other orexin inhibitors) is extremely effective, well-tolerated in those over 65 with minimal adverse reactions in clinical trials, and non-addictive. In clinical trials it was shown to improve total sleep time, sleep latency, waking after sleep onset, and quality of sleep, and the only adverse reaction that clearly  from placebo was morning grogginess (Suvorexant 7%, placebo 3%). If insurance requires a prior authorization, guidelines established by the American Academy of Sleep Medicine (AASM) should be sufficient justification for using Suvorexant. Per the AASM guidelines (Satdevon et al, J Clin Sleep Med, 2017;13(2):307-349), Suvorexant is the most reasonable choice for the treatment of the insomnia in those over 65, particularly in the setting of cognitive impairment. Benzodiazepines (e.g., Triazolam, Temazepam), Z drugs (e.g., Zolpidem, Eszopiclone), and anticholinergic medications (e.g., Doxepin) are not recommended to treat insomnia in those over 65 and are listed on the AGS Beers Criteria (AGS Beers Criteria Update Expert Panel, J Am Geriatr Soc, 2019;67(4):674-694). These medications increase the risk for falls and worsen cognition. Other medications, including Zaleplon and Ramelteon are  only indicated for sleep-onset insomnia. Trazodone is not recommended by AASM for the treatment of insomnia.     Care Management Plan:    #Diagnostic Screening for reversible forms of neurocognitive disorders  We recommend screening for reversible causes of neurocognitive impairment with plasma laboratories  We have ordered plasma CBC, CMP, Vitamins (B1, B9, B12), Mg, RPR, MMA, TSH, T4, Nfl  #Diagnostic Screening for measurable forms of neurodegenerative pathology.  We have discussed opportunities for biomarker testing (CSF Sim biomarkers, IDEAs Amyloid-PET, Syn-One skin biopsy).  We scheduled a skin biopsy for assessment of Syn-One alpha-synuclein related pathology  #Optimize Neurocognitive Impairment and Quality  We have discussed the MIND Diet and other lifestyle behavior that may help maintain brain health.  We have provided written/digital reading material  #Optimize Behavioral Management and Quality.  No indication for memantine at this time  Continue Lexapro 20 mg daily  Continue oxcarbamazepine 600 mg b.i.d.  #Optimize Sleep Hygiene and Quality  We discussed and recommended additional diagnostic/management of sleep disorder to optimize brain health and longevity.  We have placed referrals to sleep medicine due to signs and symptoms suggestive of sleep apnea.  Start Ramelteon 8 mg at night.  Start Modafinil 50 mg in Am  #Behavioral/Environmental Strategies  We recommend engaging in activities that stimulate cognitively and socially while avoiding excessive stimulation and fatigue in overwhelmingly complex situations.  We recommend integrating routine and schedule into your daily life. https://www.alzheimersproject.org/news/the-importance-of-routine-and-familiarity-to-persons-with-dementia/  #Health Maintenance/Lifestyle Advice  We have discussed the value in aggressively controlling vascular risk factors like hypertension, hyperlipidemia, and Diabetes SBP<130, LDL<100, A1C<7.0.  We discussed the need to  "optimize lifestyle choices including a heart-healthy diet (e.g., Mediterranean or DASH), increased cardiovascular exercise (goal 150 minutes of moderate-intensity per week), and stay cognitively and socially active.  We recommend the MIND diet, a combination of two healthy diets: the Mediterranean diet and the DASH (Dietary Approaches to Stop Hypertension) diet, and includes a variety of brain-friendly foods to optimize cognitive health and longevity.  #Support  We all need support sometimes. Get easy access to local resources, community programs, and services. https://www.communityresourcefinder.org/  Learn more about Cognitive Impairment in [place]: https://www.alz.org/professionals/public-health/state-overview/[place]  #Safety  The Alzheimer's Association administers the nationwide "Safe Return" program with identification bracelets, necklaces, or clothing tags and 24-hour assistance. More information is available online at https://www.alz.org/help-support/caregiving/safety/medicalert-with-24-7-wandering-support  #Follow up:  Follow-up in 4 weeks (Sep 2024).    Thank you for allowing us to participate in the care of your patient. Please do not hesitate to contact us with any questions or concerns.     It was a pleasure seeing The patient and we look forward to seeing them at their follow-up visit.     This note is dictated on M*Modal Fluency Direct word recognition program. There are word recognition mistakes that are occasionally missed on review.       Scheduled Follow-up :  Future Appointments   Date Time Provider Department Center   9/5/2024  3:00 PM LAB, [facility] ST[facility]       After Visit Medication List :     Medication List            Accurate as of August 14, 2024  3:27 PM. If you have any questions, ask your nurse or doctor.                CHANGE how you take these medications      LORazepam 2 MG Tab  Commonly known as: ATIVAN  Take 1 tablet (2 mg total) by mouth 3 (three) times " daily as needed.  What changed: when to take this            CONTINUE taking these medications      amLODIPine 5 MG tablet  Commonly known as: NORVASC     buPROPion 150 MG TB24 tablet  Commonly known as: WELLBUTRIN XL  TAKE ONE TABLET BY MOUTH ONCE A DAY     cholecalciferol (vitamin D3) 1,250 mcg (50,000 unit) capsule  Take 1 capsule (50,000 Units total) by mouth every 7 days.     EScitalopram oxalate 20 MG tablet  Commonly known as: LEXAPRO  TAKE 1 TABLET (20 MG TOTAL) BY MOUTH EVERY EVENING.     estradioL 0.01 % (0.1 mg/gram) vaginal cream  Commonly known as: ESTRACE     KESIMPTA PEN 20 mg/0.4 mL Pnij  Generic drug: ofatumumab  INJECT 20MG INTO THE SKIN EVERY 28 DAYS     OXcarbazepine 600 MG Tab  Commonly known as: TRILEPTAL  Take 1 tablet (600 mg total) by mouth 2 (two) times daily.     potassium citrate 15 mEq Tbsr  Commonly known as: UROCIT-K 15     rosuvastatin 10 MG tablet  Commonly known as: CRESTOR              Signing Physician:  Logan Leon MD    Billing:       -----------------------------------------------------------------------------  I spent a total of 90 minutes (time-in: 15:00 PM; time-out: 16:30 PM) on 2024-08-14, in person face-to-face with the patient and caregiver(s), >50% of that time was spent counseling regarding the symptoms, treatment plan, risks, therapeutic options, lifestyle modifications, and/or safety issues for the diagnoses above.  10/14 Review of Systems completed and is negative except as stated above in HPI (Systems reviewed: Const, Eyes, ENT, Resp, CV, GI, , MSK, Skin, Neuro)  I reviewed previous labs for a total of 5 minutes on 2024-08-14. This is directly related to the face-to-face encounter. Review of previous labs was performed all negative except as stated above in HPI  I reviewed previous diagnostic testing for a total of 5 minutes on 2024-08-14. This is directly related to the face-to-face encounter. A review of previous diagnostic testing was performed was noted to  be within normal limits except as is stated above in HPI  I performed a neurobehavioral status examination that included a clinical assessment of thinking, reasoning, and judgment to ensure a comprehensive approach in managing the complex and evolving needs of the patient's neurocognitive condition. Please see above HPI and ROS for full details. This exam was performed on 2024-08-14 and included 14 minutes spent on direct face-to-face clinical observation and interview with the patient and 18 minutes spent interpreting test results and preparing the report. The total time of 32 minutes spent on the neurobehavioral status examination is not included in the time spent on evaluation and management coding.  I conducted a comprehensive neuropsychological evaluation on 2024-08-14 in response to reported concerns of a discernible deviation from the patient's previously estimated cognitive functioning levels. The goal of this evaluation was to gauge these changes and their impact on the patient's daily life. This assessment involved administering a series of standardized neurocognitive tests, which are critical in objectively measuring various cognitive functions such as memory, attention, executive functions, visuospatial skills, and language. These tests were carefully chosen based on the patient's presenting symptoms and medical history to ensure an accurate assessment of their current cognitive functioning. Informed consent was duly obtained from the patient prior to administering these tests. The face-to-face administration of these tests took 11 minutes of direct interaction with the patient. Additionally, I spent 21 minutes on interpreting the standardized test results within the broader context of the patient's overall clinical presentation. Developing a tailored treatment plan involved integrating these findings with the patient's medical history, symptomatology, and other available data to develop a comprehensive  understanding of their neuropsychological status. Feedback was provided to the patient and their caregiver, discussing the implications of the test findings and outlining recommended next steps. The total duration of this neuropsychological evaluation was 32 minutes. It is important to note that the time spent on this evaluation is distinctly separate from any evaluation and management services provided on the same day. The detailed findings, interpretations, and recommendations from this assessment are thoroughly documented in the neuropsychological assessment report above. This comprehensive cognitive assessment is essential for establishing a clear cognitive baseline, accurately diagnosing the patient's condition, facilitating targeted interventions and personalized care plans, and providing a basis for ongoing monitoring and adjustment of the care plan.  Total Billing time spent on encounter/documentation for this patient's evaluation and management, not including the neurobehavioral status examination and neuropsychological evaluation: 75 minutes.

## 2024-08-15 ENCOUNTER — TELEPHONE (OUTPATIENT)
Dept: NEUROLOGY | Facility: CLINIC | Age: 64
End: 2024-08-15
Payer: COMMERCIAL

## 2024-08-15 NOTE — TELEPHONE ENCOUNTER
Answered message via [employer] - provided Ellie Mayer with ICD10 code of G47.33 - obstructive sleep apnea for the usage indication for modafinil. Ellie relayed this to pharmacist.

## 2024-08-15 NOTE — TELEPHONE ENCOUNTER
----- Message from Song Babcock sent at 8/15/2024  8:12 AM CDT -----  Regarding: RE: new prescription  Contact: [phone]    ----- Message -----  From: Juliet Arechiga  Sent: 8/14/2024   4:49 PM CDT  To: Carolyn BOSCH Staff  Subject: new prescription                                 Christinia with / [facility] in [place] is calling to verify this prescription for ( modafiniL (PROVIGIL) 100 MG Tab ) because this patient has never had this medication filled with them in the past. Please contact anyone in the pharmacy to verify this prescription at [phone]

## 2024-08-16 LAB — VIT B12 SERPL-MCNC: 245 NG/L (ref 180–914)

## 2024-08-17 PROBLEM — F02.84: Status: ACTIVE | Noted: 2024-08-17

## 2024-08-20 LAB — METHYLMALONATE SERPL-SCNC: 0.18 NMOL/ML

## 2024-09-25 ENCOUNTER — LAB VISIT (OUTPATIENT)
Dept: LAB | Facility: HOSPITAL | Age: 64
End: 2024-09-25
Attending: CLINICAL NURSE SPECIALIST
Payer: COMMERCIAL

## 2024-09-25 DIAGNOSIS — G35 MULTIPLE SCLEROSIS: ICD-10-CM

## 2024-09-25 LAB
25(OH)D3+25(OH)D2 SERPL-MCNC: 95 NG/ML (ref 30–96)
ALBUMIN SERPL BCP-MCNC: 4.1 G/DL (ref 3.5–5.2)
ALP SERPL-CCNC: 86 U/L (ref 55–135)
ALT SERPL W/O P-5'-P-CCNC: 6 U/L (ref 10–44)
ANION GAP SERPL CALC-SCNC: 9 MMOL/L (ref 8–16)
AST SERPL-CCNC: 14 U/L (ref 10–40)
BASOPHILS # BLD AUTO: 0.05 K/UL (ref 0–0.2)
BASOPHILS NFR BLD: 0.6 % (ref 0–1.9)
BILIRUB SERPL-MCNC: 0.2 MG/DL (ref 0.1–1)
BUN SERPL-MCNC: 12 MG/DL (ref 8–23)
CALCIUM SERPL-MCNC: 10.1 MG/DL (ref 8.7–10.5)
CHLORIDE SERPL-SCNC: 103 MMOL/L (ref 95–110)
CO2 SERPL-SCNC: 27 MMOL/L (ref 23–29)
CREAT SERPL-MCNC: 0.8 MG/DL (ref 0.5–1.4)
DIFFERENTIAL METHOD BLD: ABNORMAL
EOSINOPHIL # BLD AUTO: 0.2 K/UL (ref 0–0.5)
EOSINOPHIL NFR BLD: 2.8 % (ref 0–8)
ERYTHROCYTE [DISTWIDTH] IN BLOOD BY AUTOMATED COUNT: 12.8 % (ref 11.5–14.5)
EST. GFR  (NO RACE VARIABLE): >60 ML/MIN/1.73 M^2
GLUCOSE SERPL-MCNC: 88 MG/DL (ref 70–110)
HBV CORE AB SERPL QL IA: NORMAL
HBV SURFACE AG SERPL QL IA: NORMAL
HCT VFR BLD AUTO: 42.2 % (ref 37–48.5)
HGB BLD-MCNC: 14.2 G/DL (ref 12–16)
IGA SERPL-MCNC: 254 MG/DL (ref 40–350)
IGG SERPL-MCNC: 1002 MG/DL (ref 650–1600)
IGM SERPL-MCNC: 26 MG/DL (ref 50–300)
IMM GRANULOCYTES # BLD AUTO: 0.02 K/UL (ref 0–0.04)
IMM GRANULOCYTES NFR BLD AUTO: 0.2 % (ref 0–0.5)
LYMPHOCYTES # BLD AUTO: 1.5 K/UL (ref 1–4.8)
LYMPHOCYTES NFR BLD: 17.8 % (ref 18–48)
MCH RBC QN AUTO: 28.2 PG (ref 27–31)
MCHC RBC AUTO-ENTMCNC: 33.6 G/DL (ref 32–36)
MCV RBC AUTO: 84 FL (ref 82–98)
MONOCYTES # BLD AUTO: 0.5 K/UL (ref 0.3–1)
MONOCYTES NFR BLD: 6.5 % (ref 4–15)
NEUTROPHILS # BLD AUTO: 5.9 K/UL (ref 1.8–7.7)
NEUTROPHILS NFR BLD: 72.1 % (ref 38–73)
NRBC BLD-RTO: 0 /100 WBC
PLATELET # BLD AUTO: 239 K/UL (ref 150–450)
PMV BLD AUTO: 11.2 FL (ref 9.2–12.9)
POTASSIUM SERPL-SCNC: 3.4 MMOL/L (ref 3.5–5.1)
PROT SERPL-MCNC: 7.5 G/DL (ref 6–8.4)
RBC # BLD AUTO: 5.03 M/UL (ref 4–5.4)
SODIUM SERPL-SCNC: 139 MMOL/L (ref 136–145)
WBC # BLD AUTO: 8.15 K/UL (ref 3.9–12.7)

## 2024-09-25 PROCEDURE — 86704 HEP B CORE ANTIBODY TOTAL: CPT | Performed by: CLINICAL NURSE SPECIALIST

## 2024-09-25 PROCEDURE — 87340 HEPATITIS B SURFACE AG IA: CPT | Performed by: CLINICAL NURSE SPECIALIST

## 2024-09-25 PROCEDURE — 82306 VITAMIN D 25 HYDROXY: CPT | Performed by: CLINICAL NURSE SPECIALIST

## 2024-09-25 PROCEDURE — 36415 COLL VENOUS BLD VENIPUNCTURE: CPT | Performed by: CLINICAL NURSE SPECIALIST

## 2024-09-25 PROCEDURE — 85025 COMPLETE CBC W/AUTO DIFF WBC: CPT | Performed by: CLINICAL NURSE SPECIALIST

## 2024-09-25 PROCEDURE — 80053 COMPREHEN METABOLIC PANEL: CPT | Performed by: CLINICAL NURSE SPECIALIST

## 2024-09-25 PROCEDURE — 82784 ASSAY IGA/IGD/IGG/IGM EACH: CPT | Mod: 59 | Performed by: CLINICAL NURSE SPECIALIST

## 2024-09-26 ENCOUNTER — TELEPHONE (OUTPATIENT)
Dept: NEUROLOGY | Facility: CLINIC | Age: 64
End: 2024-09-26
Payer: COMMERCIAL

## 2024-09-26 NOTE — TELEPHONE ENCOUNTER
----- Message from JANES Lynch, CNS sent at 9/26/2024  3:22 PM CDT -----  Stable low IgM   Vit D=95  Negative hep B labs     SP or LB, please let her know to start skipping the weekends on her Vitamin D.

## 2024-09-26 NOTE — TELEPHONE ENCOUNTER
Attempted pt contact pt. No answer. Left VM relaying Jackie's msg to  start skipping the weekends on her Vitamin D.

## 2024-09-27 ENCOUNTER — PATIENT MESSAGE (OUTPATIENT)
Dept: NEUROLOGY | Facility: CLINIC | Age: 64
End: 2024-09-27
Payer: COMMERCIAL

## 2024-10-11 ENCOUNTER — TELEPHONE (OUTPATIENT)
Dept: NEUROLOGY | Facility: CLINIC | Age: 64
End: 2024-10-11
Payer: COMMERCIAL

## 2024-10-11 DIAGNOSIS — D84.9 IMMUNOSUPPRESSION: ICD-10-CM

## 2024-10-11 DIAGNOSIS — G35 MULTIPLE SCLEROSIS: ICD-10-CM

## 2024-10-11 RX ORDER — OFATUMUMAB 20 MG/.4ML
INJECTION, SOLUTION SUBCUTANEOUS
Qty: 1.2 ML | Refills: 1 | Status: SHIPPED | OUTPATIENT
Start: 2024-10-11

## 2024-10-11 NOTE — TELEPHONE ENCOUNTER
Labs   9/25/24  WBC 8.15  ALC 1451  AST 14, ALT 6  IgG 1002 IgM 26 IgA 254  HBsAg - anti-Hbc -, no current or past infection

## 2024-10-11 NOTE — TELEPHONE ENCOUNTER
----- Message from JEANNIE Rios sent at 10/10/2024  5:57 PM CDT -----  Regarding: FW: rx refill  Contact: [facility] @ [phone]    ----- Message -----  From: Mireille Peña  Sent: 10/10/2024   4:40 PM CDT  To: Petar CALDERÓN Staff  Subject: rx refill                                        Rx Refill/Request Is this a Refill or New Rx: new     Rx Name and Strength: ofatumumab (KESIMPTA PEN) 20 mg/0.4 mL PnIj     Preferred Pharmacy with phone number:     [facility] [place] [facility] [place], [place] - [address] [facility]  [address] [facility] [address] [address]  Phone: [phone] Fax: [phone]      Communication Preference: call back     Additional Information:    [facility] prescription tesfaye is advising that rx ofatumumab (KESIMPTA PEN) 20 mg/0.4 mL PnIj was approved and a new rx is needing to be sent to fill ofr pt. Please call to advise further. Thank you for all you are doing.

## 2024-12-15 RX ORDER — RAMELTEON 8 MG/1
8 TABLET ORAL NIGHTLY
Qty: 30 TABLET | Refills: 11 | Status: SHIPPED | OUTPATIENT
Start: 2024-12-15

## 2024-12-16 ENCOUNTER — PATIENT MESSAGE (OUTPATIENT)
Dept: PSYCHIATRY | Facility: CLINIC | Age: 64
End: 2024-12-16
Payer: COMMERCIAL

## 2025-01-13 DIAGNOSIS — G50.0 TRIGEMINAL NEURALGIA OF LEFT SIDE OF FACE: ICD-10-CM

## 2025-01-14 RX ORDER — OXCARBAZEPINE 600 MG/1
600 TABLET, FILM COATED ORAL 2 TIMES DAILY
Qty: 180 TABLET | Refills: 1 | Status: SHIPPED | OUTPATIENT
Start: 2025-01-14

## 2025-03-06 ENCOUNTER — TELEPHONE (OUTPATIENT)
Facility: CLINIC | Age: 65
End: 2025-03-06
Payer: COMMERCIAL

## 2025-03-06 NOTE — TELEPHONE ENCOUNTER
Attempted to speak with Pt in regards to scheduling a skin biopsy and some lab orders, left a detailed message asking for a call back.

## 2025-03-07 ENCOUNTER — PATIENT MESSAGE (OUTPATIENT)
Dept: PSYCHIATRY | Facility: CLINIC | Age: 65
End: 2025-03-07
Payer: COMMERCIAL

## 2025-03-15 ENCOUNTER — TELEPHONE (OUTPATIENT)
Dept: NEUROLOGY | Facility: CLINIC | Age: 65
End: 2025-03-15

## 2025-03-15 DIAGNOSIS — G35 MULTIPLE SCLEROSIS: Primary | ICD-10-CM

## 2025-03-15 NOTE — TELEPHONE ENCOUNTER
----- Message from JEANNIE Motley sent at 3/13/2025 11:05 AM CDT -----  Regarding: FW: MS shots  Contact: Rubina    ----- Message -----  From: Tova Vides  Sent: 3/13/2025   8:15 AM CDT  To: Carolyn BOSCH Staff  Subject: MS shots                                         Consult/Advisory Name Of Caller:Rubina  Contact Preference:Rubina Bellamy (Sister)[phone] (Mobile), requesting a call back.  Nature of call: Pt is calling to speak with someone in regards to pt having shot for MS administered.  States that pt  took care of her appts ,and is now . States that the shot  is typically mailed to them. Please advise.

## 2025-03-17 ENCOUNTER — TELEPHONE (OUTPATIENT)
Dept: NEUROLOGY | Facility: CLINIC | Age: 65
End: 2025-03-17
Payer: COMMERCIAL

## 2025-03-17 NOTE — TELEPHONE ENCOUNTER
----- Message from Jackie Davey sent at 3/15/2025 10:06 AM CDT -----  Regarding: RE: MS shots  Contact: Rubina  She needs labs and a new Rx. Call her sister on Monday morning.  ----- Message -----  From: Tiesha Montes, RN  Sent: 3/13/2025  11:05 AM CDT  To: JANES Lynch, CNS  Subject: FW: MS shots                                       ----- Message -----  From: Tova Vides  Sent: 3/13/2025   8:15 AM CDT  To: Carolyn BOSCH Staff  Subject: MS shots                                         Consult/Advisory Name Of Caller:Rubina  Contact Preference:Rubina Bellamy (Sister)[phone] (Mobile), requesting a call back.  Nature of call: Pt is calling to speak with someone in regards to pt having shot for MS administered.  States that pt  took care of her appts ,and is now . States that the shot  is typically mailed to them. Please advise.

## 2025-03-18 ENCOUNTER — TELEPHONE (OUTPATIENT)
Facility: CLINIC | Age: 65
End: 2025-03-18
Payer: COMMERCIAL

## 2025-03-18 NOTE — TELEPHONE ENCOUNTER
Called and spoke with Pts sister in regards to scheduling labs and a skin biopsy, Pt is scheduled for labs 3/20 and her biopsy 4/24. Messaged Flores DENNIS, to get auth started for biopsy.

## 2025-03-20 ENCOUNTER — LAB VISIT (OUTPATIENT)
Dept: LAB | Facility: HOSPITAL | Age: 65
End: 2025-03-20
Attending: PSYCHIATRY & NEUROLOGY
Payer: COMMERCIAL

## 2025-03-20 ENCOUNTER — TELEPHONE (OUTPATIENT)
Dept: NEUROLOGY | Facility: CLINIC | Age: 65
End: 2025-03-20
Payer: COMMERCIAL

## 2025-03-20 DIAGNOSIS — F02.80 MIXED DEMENTIA: ICD-10-CM

## 2025-03-20 DIAGNOSIS — G35 COGNITIVE DYSFUNCTION ACCOMPANYING MULTIPLE SCLEROSIS: ICD-10-CM

## 2025-03-20 DIAGNOSIS — G30.9 MIXED DEMENTIA: ICD-10-CM

## 2025-03-20 DIAGNOSIS — F01.50 MIXED DEMENTIA: ICD-10-CM

## 2025-03-20 DIAGNOSIS — F09 COGNITIVE DYSFUNCTION ACCOMPANYING MULTIPLE SCLEROSIS: ICD-10-CM

## 2025-03-20 LAB
ALBUMIN SERPL BCP-MCNC: 4.2 G/DL (ref 3.5–5.2)
ALP SERPL-CCNC: 106 U/L (ref 40–150)
ALT SERPL W/O P-5'-P-CCNC: 10 U/L (ref 10–44)
ANION GAP SERPL CALC-SCNC: 8 MMOL/L (ref 8–16)
AST SERPL-CCNC: 18 U/L (ref 10–40)
BASOPHILS # BLD AUTO: 0.07 K/UL (ref 0–0.2)
BASOPHILS NFR BLD: 0.8 % (ref 0–1.9)
BILIRUB SERPL-MCNC: 0.2 MG/DL (ref 0.1–1)
BUN SERPL-MCNC: 14 MG/DL (ref 8–23)
CALCIUM SERPL-MCNC: 9.8 MG/DL (ref 8.7–10.5)
CHLORIDE SERPL-SCNC: 103 MMOL/L (ref 95–110)
CO2 SERPL-SCNC: 28 MMOL/L (ref 23–29)
CREAT SERPL-MCNC: 0.9 MG/DL (ref 0.5–1.4)
DIFFERENTIAL METHOD BLD: NORMAL
EOSINOPHIL # BLD AUTO: 0.3 K/UL (ref 0–0.5)
EOSINOPHIL NFR BLD: 3.7 % (ref 0–8)
ERYTHROCYTE [DISTWIDTH] IN BLOOD BY AUTOMATED COUNT: 12.8 % (ref 11.5–14.5)
EST. GFR  (NO RACE VARIABLE): >60 ML/MIN/1.73 M^2
GLUCOSE SERPL-MCNC: 115 MG/DL (ref 70–110)
HCT VFR BLD AUTO: 45.3 % (ref 37–48.5)
HCYS SERPL-SCNC: 11.6 UMOL/L (ref 4–15.5)
HGB BLD-MCNC: 14.9 G/DL (ref 12–16)
IMM GRANULOCYTES # BLD AUTO: 0.03 K/UL (ref 0–0.04)
IMM GRANULOCYTES NFR BLD AUTO: 0.4 % (ref 0–0.5)
LYMPHOCYTES # BLD AUTO: 1.7 K/UL (ref 1–4.8)
LYMPHOCYTES NFR BLD: 19.8 % (ref 18–48)
MAGNESIUM SERPL-MCNC: 2.3 MG/DL (ref 1.6–2.6)
MCH RBC QN AUTO: 28.6 PG (ref 27–31)
MCHC RBC AUTO-ENTMCNC: 32.9 G/DL (ref 32–36)
MCV RBC AUTO: 87 FL (ref 82–98)
MONOCYTES # BLD AUTO: 0.7 K/UL (ref 0.3–1)
MONOCYTES NFR BLD: 8.6 % (ref 4–15)
NEUTROPHILS # BLD AUTO: 5.6 K/UL (ref 1.8–7.7)
NEUTROPHILS NFR BLD: 66.7 % (ref 38–73)
NRBC BLD-RTO: 0 /100 WBC
PLATELET # BLD AUTO: 273 K/UL (ref 150–450)
PMV BLD AUTO: 11 FL (ref 9.2–12.9)
POTASSIUM SERPL-SCNC: 3.9 MMOL/L (ref 3.5–5.1)
PROT SERPL-MCNC: 8 G/DL (ref 6–8.4)
RBC # BLD AUTO: 5.21 M/UL (ref 4–5.4)
SODIUM SERPL-SCNC: 139 MMOL/L (ref 136–145)
T4 SERPL-MCNC: 7.5 UG/DL (ref 4.5–11.5)
TREPONEMA PALLIDUM IGG+IGM AB [PRESENCE] IN SERUM OR PLASMA BY IMMUNOASSAY: NONREACTIVE
TSH SERPL DL<=0.005 MIU/L-ACNC: 1.35 UIU/ML (ref 0.4–4)
WBC # BLD AUTO: 8.35 K/UL (ref 3.9–12.7)

## 2025-03-20 PROCEDURE — 83921 ORGANIC ACID SINGLE QUANT: CPT | Performed by: PSYCHIATRY & NEUROLOGY

## 2025-03-20 PROCEDURE — 82746 ASSAY OF FOLIC ACID SERUM: CPT | Performed by: PSYCHIATRY & NEUROLOGY

## 2025-03-20 PROCEDURE — 83735 ASSAY OF MAGNESIUM: CPT | Performed by: PSYCHIATRY & NEUROLOGY

## 2025-03-20 PROCEDURE — 82542 COL CHROMOTOGRAPHY QUAL/QUAN: CPT | Performed by: PSYCHIATRY & NEUROLOGY

## 2025-03-20 PROCEDURE — 83520 IMMUNOASSAY QUANT NOS NONAB: CPT | Performed by: PSYCHIATRY & NEUROLOGY

## 2025-03-20 PROCEDURE — 84436 ASSAY OF TOTAL THYROXINE: CPT | Performed by: PSYCHIATRY & NEUROLOGY

## 2025-03-20 PROCEDURE — 84443 ASSAY THYROID STIM HORMONE: CPT | Performed by: PSYCHIATRY & NEUROLOGY

## 2025-03-20 PROCEDURE — 84393 TAU PHOSPHORYLATED EA: CPT | Performed by: PSYCHIATRY & NEUROLOGY

## 2025-03-20 PROCEDURE — 80053 COMPREHEN METABOLIC PANEL: CPT | Performed by: PSYCHIATRY & NEUROLOGY

## 2025-03-20 PROCEDURE — 83090 ASSAY OF HOMOCYSTEINE: CPT | Performed by: PSYCHIATRY & NEUROLOGY

## 2025-03-20 PROCEDURE — 36415 COLL VENOUS BLD VENIPUNCTURE: CPT | Performed by: PSYCHIATRY & NEUROLOGY

## 2025-03-20 PROCEDURE — 85025 COMPLETE CBC W/AUTO DIFF WBC: CPT | Performed by: PSYCHIATRY & NEUROLOGY

## 2025-03-20 PROCEDURE — 84425 ASSAY OF VITAMIN B-1: CPT | Performed by: PSYCHIATRY & NEUROLOGY

## 2025-03-20 PROCEDURE — 86593 SYPHILIS TEST NON-TREP QUANT: CPT | Performed by: PSYCHIATRY & NEUROLOGY

## 2025-03-20 PROCEDURE — 82784 ASSAY IGA/IGD/IGG/IGM EACH: CPT | Performed by: PSYCHIATRY & NEUROLOGY

## 2025-03-21 ENCOUNTER — TELEPHONE (OUTPATIENT)
Facility: CLINIC | Age: 65
End: 2025-03-21
Payer: COMMERCIAL

## 2025-03-21 ENCOUNTER — TELEPHONE (OUTPATIENT)
Dept: NEUROLOGY | Facility: CLINIC | Age: 65
End: 2025-03-21
Payer: COMMERCIAL

## 2025-03-21 LAB
FOLATE SERPL-MCNC: 4.2 NG/ML (ref 4–24)
IGG SERPL-MCNC: 1024 MG/DL (ref 650–1600)

## 2025-03-21 NOTE — TELEPHONE ENCOUNTER
LVM for pt to contact our office in regards to getting pt scheduled for an appt with AP or BB in April or May.

## 2025-03-24 LAB
GFAP, PLASMA: 86.2 PG/ML (ref 0–186)
IMMUNOLOGIST REVIEW: NORMAL
M PHOSPHO-TAU 217: 0.11 PG/ML

## 2025-03-25 LAB — METHYLMALONATE SERPL-SCNC: 0.28 UMOL/L

## 2025-03-26 LAB — VIT B1 BLD-MCNC: 73 UG/L (ref 38–122)

## 2025-03-27 LAB
2-KETOBUTYRIC ACID: 1.7 NMOL/ML
2ME3OH-BUTYRATE SERPL-SCNC: 1.7 NMOL/ML
2OXO3ME-VALERATE SERPL-SCNC: 7.3 NMOL/ML
2OXOISOCAPROATE SERPL-SCNC: 20.3 NMOL/ML
2OXOISOVALERATE SERPL-SCNC: 8.1 NMOL/ML
3ME-GLUTACONATE SERPL-SCNC: 0.7 NMOL/ML
3OH-ISOVALERATE SERPL-SCNC: 0.2 NMOL/ML
3OH-PROPIONATE PLAS-SCNC: 1.6 NMOL/ML
A-KETOGLUT SERPL-SCNC: 6.3 NMOL/ML
A-OH-BUTYR SERPL-SCNC: 5.1 NMOL/ML
ACETOACETIC ACID, PL (ORG AC): 12.7 NMOL/ML
ACONITATE SERPL-SCNC: 4.6 NMOL/ML
B-OH-BUTYR SERPL-SCNC: 20 NMOL/ML
B12 DEF, 2-METHYLCITRIC ACID: 0.5 NMOL/ML
FUMARATE SERPL-SCNC: 1.7 NMOL/ML
LACTATE SERPL-SCNC: 1432 NMOL/ML
MALATE SERPL-SCNC: 5.2 NMOL/ML
MITOCHONDRIAL METABOLITES INTERPRETATION: NORMAL
PROVIDER SIGNING NAME: NORMAL
PYRUVATE SERPL-SCNC: 69.9 NMOL/ML
SUCCINATE SERPL-SCNC: 3.8 NMOL/ML

## 2025-03-28 LAB — MAYO MISCELLANEOUS RESULT (REF): 56.9 PG/ML

## 2025-04-01 ENCOUNTER — TELEPHONE (OUTPATIENT)
Dept: NEUROLOGY | Facility: CLINIC | Age: 65
End: 2025-04-01
Payer: COMMERCIAL

## 2025-04-01 DIAGNOSIS — G35 MULTIPLE SCLEROSIS: Primary | ICD-10-CM

## 2025-04-17 ENCOUNTER — TELEPHONE (OUTPATIENT)
Dept: NEUROLOGY | Facility: CLINIC | Age: 65
End: 2025-04-17
Payer: COMMERCIAL

## 2025-04-21 ENCOUNTER — TELEPHONE (OUTPATIENT)
Facility: CLINIC | Age: 65
End: 2025-04-21
Payer: COMMERCIAL

## 2025-04-21 NOTE — TELEPHONE ENCOUNTER
Attempted to speak with Pt in regards to confirming upcoming procedure on 4/24 with Lynda DUMONT, and to clarify wether or not Pt in on any blood thinners and had paused them if so. Left a detailed message asking for a call back.   
You can access the FollowMyHealth Patient Portal offered by [facility] by registering at the following website: [email or web address]. By joining [employer]’s FollowMyHealth portal, you will also be able to view your health information using other applications (apps) compatible with our system.

## 2025-04-24 ENCOUNTER — LAB VISIT (OUTPATIENT)
Dept: LAB | Facility: HOSPITAL | Age: 65
End: 2025-04-24
Payer: COMMERCIAL

## 2025-04-24 ENCOUNTER — PROCEDURE VISIT (OUTPATIENT)
Facility: CLINIC | Age: 65
End: 2025-04-24
Payer: COMMERCIAL

## 2025-04-24 VITALS
BODY MASS INDEX: 24.55 KG/M2 | SYSTOLIC BLOOD PRESSURE: 120 MMHG | DIASTOLIC BLOOD PRESSURE: 77 MMHG | WEIGHT: 147.38 LBS | HEIGHT: 65 IN | HEART RATE: 69 BPM

## 2025-04-24 DIAGNOSIS — G35 MULTIPLE SCLEROSIS: ICD-10-CM

## 2025-04-24 DIAGNOSIS — G20.C PARKINSONISM, UNSPECIFIED PARKINSONISM TYPE: Primary | ICD-10-CM

## 2025-04-24 DIAGNOSIS — D84.9 IMMUNOSUPPRESSION: ICD-10-CM

## 2025-04-24 LAB
HBV CORE AB SERPL QL IA: NORMAL
HBV SURFACE AG SERPL QL IA: NORMAL
IGA SERPL-MCNC: 254 MG/DL (ref 40–350)
IGG SERPL-MCNC: 947 MG/DL (ref 650–1600)
IGM SERPL-MCNC: 27 MG/DL (ref 50–300)

## 2025-04-24 PROCEDURE — 86704 HEP B CORE ANTIBODY TOTAL: CPT

## 2025-04-24 PROCEDURE — 36415 COLL VENOUS BLD VENIPUNCTURE: CPT

## 2025-04-24 PROCEDURE — 87340 HEPATITIS B SURFACE AG IA: CPT

## 2025-04-24 PROCEDURE — 82784 ASSAY IGA/IGD/IGG/IGM EACH: CPT | Mod: 59

## 2025-04-24 PROCEDURE — 11104 PUNCH BX SKIN SINGLE LESION: CPT | Mod: S$GLB,,, | Performed by: NURSE PRACTITIONER

## 2025-04-24 PROCEDURE — 11105 PUNCH BX SKIN EA SEP/ADDL: CPT | Mod: S$GLB,,, | Performed by: NURSE PRACTITIONER

## 2025-04-24 NOTE — PROCEDURES
PRE-OP DIAGNOSIS:  POST-OP DIAGNOSIS: Same   PROCEDURE: skin punch biopsy     Performing Physician: Lynda Landeros NP  Supervising Physician (if applicable): Logan Leon MD.     PROCEDURE:   _  Shave Biopsy  X  Punch Biopsy  _  Incisional Biopsy     DESCRIPTION:  - Punch Size: 0.5 cm  - Number of Punch Biopsies: 3 Right side   + CPT 02089 (punch biopsy, neck) 1st procedure  + CPT 28522 (punch biopsy, each additional lesion, thigh) 2nd procedure  + CPT 48547 (punch biopsy, each additional lesion, ankle) 3rd procedure     The area surrounding the skin lesion was prepared and draped in the  usual sterile manner. The lesion was removed in the usual manner by punch biopsy method noted above. Three full thickness cylindrical samples of the skin were removed from the upper back, lower thigh, and lower leg. The intent of the biopsy is to remove a sample of a cutaneous lesion for Syn-One diagnostic pathologic examination. Hemostasis was assured. The patient tolerated  the procedure well.     Closure:       _  Monsels for hemostasis                _  Suture   X Simple closure  _ None     Followup: The patient tolerated the procedure well without  complications.  Standard post-procedure care is explained and return  precautions are given.     Pathology/biopsy specimens were sent directly to [facility] CLIA-Certified Pathology Lab for processing. Pathology was not sent via in-house [facility] laboratory. Pathology results will be returned within 4-6 weeks and scanned into [facility] Electronic Medical Record.

## 2025-04-25 ENCOUNTER — RESULTS FOLLOW-UP (OUTPATIENT)
Dept: NEUROLOGY | Facility: CLINIC | Age: 65
End: 2025-04-25

## 2025-04-25 RX ORDER — OFATUMUMAB 20 MG/.4ML
INJECTION, SOLUTION SUBCUTANEOUS
Qty: 0.4 ML | Refills: 2 | Status: SHIPPED | OUTPATIENT
Start: 2025-04-25

## 2025-05-05 ENCOUNTER — TELEPHONE (OUTPATIENT)
Dept: PSYCHIATRY | Facility: CLINIC | Age: 65
End: 2025-05-05
Payer: COMMERCIAL

## 2025-05-05 NOTE — TELEPHONE ENCOUNTER
"SW phoned pt's sister Rubina Bellamy [phone] to introduce SW team at [facility] and to check in following death of pt's  earlier this year.  Sister shared that pt is "doing okay" and denied any concerns or needs at this time.  Encouraged Ms. Bellamy to keep this SW phone number in case needs arise. She agreed and said she is looking forward to ofc visit with Dr. Zimmer later this month.   "

## 2025-05-13 ENCOUNTER — PATIENT MESSAGE (OUTPATIENT)
Dept: PSYCHIATRY | Facility: CLINIC | Age: 65
End: 2025-05-13
Payer: MEDICARE

## 2025-05-14 ENCOUNTER — OFFICE VISIT (OUTPATIENT)
Dept: NEUROLOGY | Facility: CLINIC | Age: 65
End: 2025-05-14
Payer: MEDICARE

## 2025-05-14 VITALS
HEIGHT: 65 IN | HEART RATE: 80 BPM | WEIGHT: 139.13 LBS | BODY MASS INDEX: 23.18 KG/M2 | SYSTOLIC BLOOD PRESSURE: 129 MMHG | DIASTOLIC BLOOD PRESSURE: 81 MMHG

## 2025-05-14 DIAGNOSIS — G35 MS (MULTIPLE SCLEROSIS): ICD-10-CM

## 2025-05-14 DIAGNOSIS — F02.80 MIXED DEMENTIA: ICD-10-CM

## 2025-05-14 DIAGNOSIS — F01.50 MIXED DEMENTIA: ICD-10-CM

## 2025-05-14 DIAGNOSIS — G82.20 PARAPLEGIA: ICD-10-CM

## 2025-05-14 DIAGNOSIS — R29.898 WEAKNESS OF BOTH LOWER EXTREMITIES: ICD-10-CM

## 2025-05-14 DIAGNOSIS — R26.9 GAIT DISTURBANCE: ICD-10-CM

## 2025-05-14 DIAGNOSIS — G30.9 MIXED DEMENTIA: ICD-10-CM

## 2025-05-14 DIAGNOSIS — F32.A DEPRESSION, UNSPECIFIED DEPRESSION TYPE: Primary | ICD-10-CM

## 2025-05-14 PROCEDURE — 99215 OFFICE O/P EST HI 40 MIN: CPT | Mod: S$PBB,,, | Performed by: PSYCHIATRY & NEUROLOGY

## 2025-05-14 PROCEDURE — 99999 PR PBB SHADOW E&M-EST. PATIENT-LVL III: CPT | Mod: PBBFAC,,, | Performed by: PSYCHIATRY & NEUROLOGY

## 2025-05-14 PROCEDURE — 99213 OFFICE O/P EST LOW 20 MIN: CPT | Mod: PBBFAC | Performed by: PSYCHIATRY & NEUROLOGY

## 2025-05-14 PROCEDURE — G2211 COMPLEX E/M VISIT ADD ON: HCPCS | Mod: S$PBB,,, | Performed by: PSYCHIATRY & NEUROLOGY

## 2025-05-14 RX ORDER — ARIPIPRAZOLE 5 MG/1
5 TABLET ORAL DAILY
Qty: 30 TABLET | Refills: 11 | Status: SHIPPED | OUTPATIENT
Start: 2025-05-14 | End: 2026-05-14

## 2025-05-14 NOTE — PROGRESS NOTES
Subjective:          Patient ID: Deb Figueroa is a 64 y.o. female who presents today for a routine  visit for MS.  She's accompanied by a sister and her friend    NEURO MULTIPLE SCLEROISIS SUMMARY:   Disease type currently:  Secondarily Progressive MS  Current Therapy:  Ofatumumab       MS HPI:  DMT: Kesimpta   Side effects from DMT? No  Taking vitamin D3 as recommended? Yes -   Her   suddenly in February   Since then her sister has moved in to help take care of her;  She lives in the same house.    She sits in her transport chair and moves her feet to get around; has a power chair which is too complicated to use. Even has a manual wheelchair, but too complicated to use  Transfers are very difficult; does not weight bear; open to PT/OT home assessment  Needs Gabriel lift  Getting a daily headache - very strong; frontal; taking excedrin at times; No flashing lights  Wears depends;   Does not qualify for Medicaid  [facility]     Medications:  Current Outpatient Medications   Medication Sig    amlodipine (NORVASC) 5 MG tablet Take 5 mg by mouth once daily.    escitalopram oxalate (LEXAPRO) 20 MG tablet TAKE 1 TABLET (20 MG TOTAL) BY MOUTH EVERY EVENING.    KESIMPTA PEN 20 mg/0.4 mL PnIj INJECT 20MG INTO THE SKIN EVERY 28 DAYS    lorazepam (ATIVAN) 2 MG Tab Take 1 tablet (2 mg total) by mouth 3 (three) times daily as needed.    OXcarbazepine (TRILEPTAL) 600 MG Tab Take 1 tablet (600 mg total) by mouth 2 (two) times daily.    rosuvastatin (CRESTOR) 10 MG tablet Take 10 mg by mouth every evening.     ARIPiprazole (ABILIFY) 5 MG Tab Take 1 tablet (5 mg total) by mouth once daily.     No current facility-administered medications for this visit.       SOCIAL HISTORY  Social History[1]    Living arrangements - the patient lives with her sister            2022    11:59 AM   REVIEW OF SYMPTOMS   Do you feel abnormally tired on most days? Yes    Do you feel you generally  sleep well? Yes    Do you have difficulty controlling your bladder?  Yes    Do you have difficulty controlling your bowels?  Yes    Do you have frequent muscle cramps, tightness or spasms in your limbs?  No    Do you have new visual symptoms?  Yes    Do you have worsening difficulty with your memory or thinking? Yes    Do you have worsening symptoms of anxiety or depression?  Yes    For patients who walk, Do you have more difficulty walking?  Not Applicable    Have you fallen since your last visit?  Yes    For patients who use wheelchairs: Do you have any skin wounds or breakdown? No    Do you have difficulty using your hands?  Yes    Do you have shooting or burning pain? Yes    Do you have difficulty with sexual function?  Yes    If you are sexually active, are you using birth control? Y/N  N/A Not Applicable    Do you often choke when swallowing liquids or solid food?  No    Do you experience worsening symptoms when overheated? Yes    Do you need any new equipment such as a wheelchair, walker or shower chair? Yes    Do you receive co-pay financial assistance for your principal MS medicine? Yes    Would you be interested in participating in an MS research trial in the future? Yes    For patients on Gilenya, Tecfidera, Aubagio, Rituxan, Ocrevus, Tysabri, Lemtrada or Methotrexate, are you aware that you should NOT receive live virus vaccines?  Yes    Do you feel you have adequate family/friend support?  Yes    Do you have health insurance?   Yes    Are you currently employed? No    Do you receive SSDI/SSI?  No    Do you use marijuana or cannabis products? No    Have you been diagnosed with a urinary tract infection since your last visit here? Yes    Have you been diagnosed with a respiratory tract infection since your last visit here? No    Have you been to the emergency room since your last visit here? Yes    Have you been hospitalized since your last visit here?  No        Proxy-reported           4/7/2022    12:09  PM   FSS SCORE & INTERPRETATION   FSS SCORE  60   FSS SCORE INTERPRETATION May be suffering from fatigue         4/7/2022    12:07 PM   MS EUGENE-D SCORE & INTERPRETATION   EUGENE-D SCORE  35   EUGENE-D INTERPRETATION  Possibility of major Depression         4/7/2022    12:02 PM   MS JACE-7 SCORE & INTERPRETATION   JACE-7 SCORE  12   JACE-7 SCORE INTERPRETATION Moderate Anxiety         4/7/2022    12:10 PM   PEQ MS MOS PAIN EFFECTS SCORE & INTERPRETATION   PES SCORE 26   PES SCORE INTERPRETATION Scores can range from 6-30.  Items are scaled so that higher scores indicate a greater impact of pain on a patients mood and behavior.         4/7/2022    12:11 PM   PEQ MS SEXUAL SATISFACTION SCORE & INTERPRETATION   SSS SCORE  24   SSS SCORE INTERPRETATION Scores can range from 4-24.  Higher scores indicate greater problems with sexual satisfaction.         4/7/2022    12:12 PM   MS BLADDER CONTROL SCORE & INTERPRETATION   BLCS SCORE 22   BLCS SCORE INTERPRETATION  Scores can range from 0-22, with higher scores indicating greater bladder control problems.         4/7/2022    12:17 PM   MS BOWEL CONTROL SCORE & INTERPRETATION   BWCS SCORE 15   BWCS SCORE INTERPRETATION Scores can range from 0-26, with higher scores indicating greater bowel control problems.         4/7/2022    12:13 PM   PEQ MS IMPACT OF VISUAL IMPAIRMENT SCORE & INTERPRETATION   MAXIME SCALE SCORE  9   MAXIME SCORE INTERPRETATION Scores can range from 0-15, with higher scores indicating greater impact of visual problems on daily activites.         4/7/2022    12:16 PM   MS PDQ SCORE & INTERPRETATION   PDQ RETROSPECTIVE MEMORY SUBSCALE 15   PDQ ATTENTION/CONCENTRATION SUBSCALE 13   PDQ PROSPECTIVE MEMORY SUBSCALE 14   PDQ PLANNING/ORGANIZATION SUBSCALE 14   PDQ TOTAL SCORE 56   PDQ SCORE INTERPRETATION Scores can range from 0-80, with higher scores indicating greater perceived cognitive impairment.         4/7/2022    12:19 PM   MSSS SCORE & INTERPRETATION   MSSS TANGIBLE  SUPPORT SUBSCALE 100   MSSS EMOTIONAL/INFORMATIONAL SUPPORT SUBSCALE 100   MSSS AFFECTIONATE SUPPORT SUBSCALE 100   MSSS POSITIVE SOCIAL INTERACTION SUBSCALE 100   MSSS TOTAL SCORE 100   MSSS SCORE INTERPRETATION Scores can range from 0-100, with higher scores indicating greater perceived support.             Objective:      Neurological Exam  In wheelchair  MS: expansive affect; follows all verbal command;  CN: no EVAN, no dysarthria  MOTOR; RHF 1/5, RKF 3/5, RDF 4/5  LHF 2/5, LKF 3/5, LDF 4/5  SENS: moderate decrease in vib RLE  GAIT: unable    Imaging:     No results found for this or any previous visit.    No results found for this or any previous visit.    No results found for this or any previous visit.    Results for orders placed during the hospital encounter of 07/16/24    MRI Brain Demyelinating W W/O Contrast    Impression  Examination degraded by patient motion artifact.    No definite new or enhancing lesion to specifically indicate ongoing or active demyelination.    Unchanged ventriculomegaly.    Electronically signed by resident: Robin Flores  Date:    07/16/2024  Time:    15:25    Electronically signed by: Cholo Beasley MD  Date:    07/16/2024  Time:    16:23    Results for orders placed during the hospital encounter of 04/07/22    MRI Cervical Spine Demyelinating W W/O Contrast    Impression  Findings in the cerebral white matter and cervical/thoracic cord which are typical for multiple sclerosis inter similar in number and distribution compared to prior exam.  No new or enhancing lesions to suggest active disease.    Nonspecific right thyroid nodule measuring 21 mm mildly increased in size but noted dating back to 2015.    Multiple prominent parapelvic renal cysts again identified.  Hydronephrosis on the left is difficult to exclude CT imaging to be considered if clinically warranted.    Electronically signed by resident: Angelika Ding  Date:    04/07/2022  Time:    10:43    Electronically  signed by: Robin Pacheco  Date:    04/07/2022  Time:    15:37    Results for orders placed during the hospital encounter of 04/07/22    MRI Thoracic Spine Demyelinating W W/O Contrast    Impression  Findings in the cerebral white matter and cervical/thoracic cord which are typical for multiple sclerosis inter similar in number and distribution compared to prior exam.  No new or enhancing lesions to suggest active disease.    Nonspecific right thyroid nodule measuring 21 mm mildly increased in size but noted dating back to 2015.    Multiple prominent parapelvic renal cysts again identified.  Hydronephrosis on the left is difficult to exclude CT imaging to be considered if clinically warranted.    Electronically signed by resident: Angelika Ding  Date:    04/07/2022  Time:    10:43    Electronically signed by: Robin Pacheco  Date:    04/07/2022  Time:    15:37        Labs:     Lab Results   Component Value Date    XYARRFHX82TL 95 09/25/2024    YFTSMWEN43BS 22 (L) 03/30/2023    CAKFURLB49WW 19 (L) 06/27/2022     Lab Results   Component Value Date    JCVINDEX 0.17 07/18/2018    JCVANTIBODY Negative 07/18/2018     @resufast(NEUROLGTCHN:3)   Lab Results   Component Value Date    OB4MWDLX 81.7 11/28/2016    ABSOLUTECD3 2375 (H) 11/28/2016    SG6TNTLH 23.7 11/28/2016    ABSOLUTECD8 689 11/28/2016    IZ4FBTPJ 58.7 (H) 11/28/2016    ABSOLUTECD4 1707 (H) 11/28/2016    LABCD48 2.48 11/28/2016     Lab Results   Component Value Date    WBC 8.35 03/20/2025    RBC 5.21 03/20/2025    HGB 14.9 03/20/2025    HCT 45.3 03/20/2025    MCV 87 03/20/2025    MCH 28.6 03/20/2025    MCHC 32.9 03/20/2025    RDW 12.8 03/20/2025     03/20/2025    MPV 11.0 03/20/2025    GRAN 5.6 03/20/2025    GRAN 66.7 03/20/2025    LYMPH 1.7 03/20/2025    LYMPH 19.8 03/20/2025    MONO 0.7 03/20/2025    MONO 8.6 03/20/2025    EOS 0.3 03/20/2025    BASO 0.07 03/20/2025    EOSINOPHIL 3.7 03/20/2025    BASOPHIL 0.8 03/20/2025     Sodium   Date Value Ref  Range Status   03/20/2025 139 136 - 145 mmol/L Final     Potassium   Date Value Ref Range Status   03/20/2025 3.9 3.5 - 5.1 mmol/L Final     Chloride   Date Value Ref Range Status   03/20/2025 103 95 - 110 mmol/L Final     CO2   Date Value Ref Range Status   03/20/2025 28 23 - 29 mmol/L Final     Glucose   Date Value Ref Range Status   03/20/2025 115 (H) 70 - 110 mg/dL Final     BUN   Date Value Ref Range Status   03/20/2025 14 8 - 23 mg/dL Final     Creatinine   Date Value Ref Range Status   03/20/2025 0.9 0.5 - 1.4 mg/dL Final     Calcium   Date Value Ref Range Status   03/20/2025 9.8 8.7 - 10.5 mg/dL Final     Total Protein   Date Value Ref Range Status   03/20/2025 8.0 6.0 - 8.4 g/dL Final     Albumin   Date Value Ref Range Status   03/20/2025 4.2 3.5 - 5.2 g/dL Final     Total Bilirubin   Date Value Ref Range Status   03/20/2025 0.2 0.1 - 1.0 mg/dL Final     Comment:     For infants and newborns, interpretation of results should be based  on gestational age, weight and in agreement with clinical  observations.    Premature Infant recommended reference ranges:  Up to 24 hours.............<8.0 mg/dL  Up to 48 hours............<12.0 mg/dL  3-5 days..................<15.0 mg/dL  6-29 days.................<15.0 mg/dL       Alkaline Phosphatase   Date Value Ref Range Status   03/20/2025 106 40 - 150 U/L Final     AST   Date Value Ref Range Status   03/20/2025 18 10 - 40 U/L Final     ALT   Date Value Ref Range Status   03/20/2025 10 10 - 44 U/L Final     Anion Gap   Date Value Ref Range Status   03/20/2025 8 8 - 16 mmol/L Final     eGFR if    Date Value Ref Range Status   08/19/2021 >60 >60 mL/min/1.73 m^2 Final     eGFR if non    Date Value Ref Range Status   08/19/2021 >60 >60 mL/min/1.73 m^2 Final     Comment:     Calculation used to obtain the estimated glomerular filtration  rate (eGFR) is the CKD-EPI equation.        Lab Results   Component Value Date    HEPBSAG Non-Reactive  04/24/2025    HEPBSAB <3.00 03/30/2023    HEPBSAB Non-reactive 03/30/2023    HEPBCAB Non-Reactive 04/24/2025           MS Impression and Plan:     NEURO MULTIPLE SCLEROSIS IMPRESSION:   Number of relapses in the past year?:  0  Clinical Progression:  Worsened  MS Classification:  Secondarily Progressive MS  Current DMT: ofatumumab  DMT:  No change in management  Symptom Management:  Implement change in symptom management  Implement Change in Symptom Management:  Adaptive Needs and Mood  Implement Change in Symptom Management comment: Refer to Home Health for adaptive needs assessment (how to transfer to toilet, to chair, to bed and into car) and for general strengthening , stretching, etc     Will add Abilify to address her mood   Additional Impressions: Ms Carolina has advanced disability from MS  Patient requires a lift to transfer from the bed to a chair/wheelchair and/or from the bed to a toilet.  Without the lift this patient will be confined to a bed.    F/u 4 mo VV Jackie Davey CNS            Problem List Items Addressed This Visit          Unprioritized    Paraplegia    MS (multiple sclerosis)    Relevant Orders    PATIENT (REY) LIFT FOR HOME USE    Ambulatory referral/consult to Home Health    Lower extremity weakness    Relevant Orders    PATIENT (REY) LIFT FOR HOME USE    Ambulatory referral/consult to Home Health    Depression - Primary    Relevant Medications    ARIPiprazole (ABILIFY) 5 MG Tab    Gait disturbance    Relevant Orders    PATIENT (REY) LIFT FOR HOME USE    Ambulatory referral/consult to Home Health     Other Visit Diagnoses         Mixed dementia                Meghana Zimmer MD    I spent a total of 40 minutes on the day of the visit.This includes face to face time and non-face to face time preparing to see the patient (eg, review of tests), obtaining and/or reviewing separately obtained history, documenting clinical information in the electronic or other health record,  independently interpreting results and communicating results to the patient/family/caregiver, or care coordinator.  Visit today included increased complexity associated with the care of the episodic problem : chronic immunotherapy; addressed and managing the longitudinal care of the patient due to the serious and/or complex managed problem(s) MS.         [1]   Social History  Tobacco Use    Smoking status: Every Day     Types: Cigarettes    Smokeless tobacco: Never   Substance Use Topics    Alcohol use: No    Drug use: No

## 2025-05-14 NOTE — Clinical Note
Xavier hudson,  Pt needs a Gabriel Lift - I'll document Pt needs referral to Home Health PT and OT for adaptive needs assessment (how to transfer to toilet, to chair, to bed and into car) and for general strengthening , stretching, etc

## 2025-05-19 ENCOUNTER — TELEPHONE (OUTPATIENT)
Dept: PSYCHIATRY | Facility: CLINIC | Age: 65
End: 2025-05-19

## 2025-05-21 ENCOUNTER — TELEPHONE (OUTPATIENT)
Dept: PSYCHIATRY | Facility: CLINIC | Age: 65
End: 2025-05-21
Payer: MEDICARE

## 2025-05-23 ENCOUNTER — TELEPHONE (OUTPATIENT)
Dept: PSYCHIATRY | Facility: CLINIC | Age: 65
End: 2025-05-23
Payer: MEDICARE

## 2025-05-23 ENCOUNTER — TELEPHONE (OUTPATIENT)
Dept: PSYCHIATRY | Facility: CLINIC | Age: 65
End: 2025-05-23

## 2025-05-23 NOTE — TELEPHONE ENCOUNTER
Pt sister said that they do not have a Hospital Bed, but thinks it might be helpful to have one. I told Pt sister that I would speak with DV and BB about getting a Hospital Bed and would also look for vendors that will provide a Gabriel Lift without a Hospital Bed. I told pt sister I would follow up with her on 5/28. Pt sister also asked how they could contact the [facility]. I provided them with the phone number for the location that will be providing Pt with services.

## 2025-05-27 PROCEDURE — G0180 MD CERTIFICATION HHA PATIENT: HCPCS | Mod: ,,, | Performed by: PSYCHIATRY & NEUROLOGY

## 2025-05-28 ENCOUNTER — TELEPHONE (OUTPATIENT)
Facility: CLINIC | Age: 65
End: 2025-05-28
Payer: MEDICARE

## 2025-05-28 ENCOUNTER — TELEPHONE (OUTPATIENT)
Dept: PSYCHIATRY | Facility: CLINIC | Age: 65
End: 2025-05-28
Payer: MEDICARE

## 2025-05-28 NOTE — TELEPHONE ENCOUNTER
Attempted to speak with Pt in regards to r/s her 6/24 vv with Dr Leon, left a detailed message asking for a call back to get her r/s to July.    severe

## 2025-05-28 NOTE — TELEPHONE ENCOUNTER
I was unsuccessful in contacting Pt friend, but did speak with her sister. She said that PT visited yesterday and OT today. She said they are recommending a hospital bed. I let sister know that BB will need to submit the Hospital Bed order. Pt sister said that Pt has an appointment in late June, but she will try to make an earlier appointment.

## 2025-06-04 ENCOUNTER — TELEPHONE (OUTPATIENT)
Dept: PSYCHIATRY | Facility: CLINIC | Age: 65
End: 2025-06-04
Payer: MEDICARE

## 2025-06-06 ENCOUNTER — TELEPHONE (OUTPATIENT)
Dept: PSYCHIATRY | Facility: CLINIC | Age: 65
End: 2025-06-06
Payer: MEDICARE

## 2025-06-18 ENCOUNTER — TELEPHONE (OUTPATIENT)
Dept: PSYCHIATRY | Facility: CLINIC | Age: 65
End: 2025-06-18
Payer: MEDICARE

## 2025-06-18 ENCOUNTER — TELEPHONE (OUTPATIENT)
Dept: PSYCHIATRY | Facility: CLINIC | Age: 65
End: 2025-06-18

## 2025-06-18 NOTE — TELEPHONE ENCOUNTER
I spoke with Pt sister and she said the Gabriel Lift has not been received. I called [facility] and they stated that a copay was needed. I contacted Pt friend to update them on the copay needed. They said they will contact [facility] to make payment and will be visiting Pt tomorrow and they can let Pt sister now that the payment has been made.

## 2025-06-19 ENCOUNTER — PATIENT MESSAGE (OUTPATIENT)
Dept: PSYCHIATRY | Facility: CLINIC | Age: 65
End: 2025-06-19
Payer: MEDICARE

## 2025-06-30 ENCOUNTER — TELEPHONE (OUTPATIENT)
Dept: PSYCHIATRY | Facility: CLINIC | Age: 65
End: 2025-06-30
Payer: MEDICARE

## 2025-06-30 NOTE — TELEPHONE ENCOUNTER
I called Pt sister to see if a Gabriel Lift had been received yet. There was no answer and I left a message.

## 2025-07-03 ENCOUNTER — TELEPHONE (OUTPATIENT)
Dept: NEUROLOGY | Facility: CLINIC | Age: 65
End: 2025-07-03
Payer: MEDICARE

## 2025-07-03 NOTE — TELEPHONE ENCOUNTER
Copied from CRM #[id number]. Topic: General Inquiry - Patient Advice  >> Jul 3, 2025 10:40 AM Theresa wrote:  Jayesh with [facility] is needing orders to extend PT for pt at home pls advise       PH: [phone]

## 2025-07-03 NOTE — TELEPHONE ENCOUNTER
Returned call and provided verbal order to Jayesh, with [facility], to extend pt's PT per Dr. Zimmer.

## 2025-07-09 ENCOUNTER — TELEPHONE (OUTPATIENT)
Dept: PSYCHIATRY | Facility: CLINIC | Age: 65
End: 2025-07-09
Payer: MEDICARE

## 2025-07-09 ENCOUNTER — TELEPHONE (OUTPATIENT)
Dept: NEUROLOGY | Facility: CLINIC | Age: 65
End: 2025-07-09
Payer: MEDICARE

## 2025-07-09 NOTE — TELEPHONE ENCOUNTER
I spoke with Pt sister and she said the Gabriel Lift was received but they had to send it back because it didn't serve their purpose. Pt sister said she was still interested in a Hospital Bed. I reminded her that an earlier virtual appointment would need to be made with [facility] to get an order for the Hospital Bed.

## 2025-07-14 NOTE — PROGRESS NOTES
Subjective:          Patient ID: Deb Figueroa is a 64 y.o. female who presents today for a fit-in virtual visit for MS.  She was last seen in May by Dr. Zimmer. The history has been provided by the patient. Her friend and sister are also present on the visit.     The patient location is: her home   The chief complaint leading to consultation is: MS/hospital bed eval    Visit type: audiovisual    Face to Face time with patient: 13 minutes   30 minutes of total time spent on the encounter, which includes face to face time and non-face to face time preparing to see the patient (eg, review of tests), Obtaining and/or reviewing separately obtained history, Documenting clinical information in the electronic or other health record, Independently interpreting results (not separately reported) and communicating results to the patient/family/caregiver, or Care coordination (not separately reported).     Each patient to whom he or she provides medical services by telemedicine is:  (1) informed of the relationship between the physician and patient and the respective role of any other health care provider with respect to management of the patient; and (2) notified that he or she may decline to receive medical services by telemedicine and may withdraw from such care at any time.    MS HPI:  DMT: Kesimpta--going well   Side effects from DMT? No  Taking vitamin D3 as recommended? Yes   She received a Gabriel lift, but they are sending it back. It was not working well for them in the home.    Deb's sister reports that she would benefit from a hospital bed.her sister, Ct, is her primary caregiver since her 's death, and Ct has to essentially lift Deb out of bed each morning and put her back in bed each night. This can be very difficult, so a hospital bed with adjustable height would be helpful and limit the need for her sister to bend over while helping Deb. Deb has limited ability to stand and pivot.   Deb has  limited mobility in bed, and it can be difficult for her to find a comfortable position. She wakes up each morning with back pain. A hospital bed would allow Deb and/or her caregivers to adjust positions more easily (elevating head, feet, etc).     Deb spends about 12 hours per day in bed.    Deb has urinary incontinence at night sometimes. A hospital bed mattress is easier to clean than a regular mattress.   She and her sister deny any skin breakdown or wounds. A hospital bed supports wound prevention   She has had some close calls almost falling out of bed; bed railings would be helpful to keep her in bed and safe.       She is getting home health PT with Jayesh Ruel through [facility]. Her sister inquires about a transfer belt. I will reach out to he PT to see if he thinks this is safe for them to use.   Her sister also requests a slide board for transfers.       Medications:  Current Outpatient Medications   Medication Sig    amlodipine (NORVASC) 5 MG tablet Take 5 mg by mouth once daily.    ARIPiprazole (ABILIFY) 5 MG Tab Take 1 tablet (5 mg total) by mouth once daily.    escitalopram oxalate (LEXAPRO) 20 MG tablet TAKE 1 TABLET (20 MG TOTAL) BY MOUTH EVERY EVENING.    KESIMPTA PEN 20 mg/0.4 mL PnIj INJECT 20MG INTO THE SKIN EVERY 28 DAYS    lorazepam (ATIVAN) 2 MG Tab Take 1 tablet (2 mg total) by mouth 3 (three) times daily as needed.    OXcarbazepine (TRILEPTAL) 600 MG Tab Take 1 tablet (600 mg total) by mouth 2 (two) times daily.    rosuvastatin (CRESTOR) 10 MG tablet Take 10 mg by mouth every evening.      No current facility-administered medications for this visit.       SOCIAL HISTORY  Social History[1]    Living arrangements - the patient lives with her sister         Labs:         Lab Results   Component Value Date    WBC 8.35 03/20/2025    RBC 5.21 03/20/2025    HGB 14.9 03/20/2025    HCT 45.3 03/20/2025    MCV 87 03/20/2025    MCH 28.6 03/20/2025    MCHC 32.9 03/20/2025    RDW 12.8  03/20/2025     03/20/2025    MPV 11.0 03/20/2025    GRAN 5.6 03/20/2025    GRAN 66.7 03/20/2025    LYMPH 1.7 03/20/2025    LYMPH 19.8 03/20/2025    MONO 0.7 03/20/2025    MONO 8.6 03/20/2025    EOS 0.3 03/20/2025    BASO 0.07 03/20/2025    EOSINOPHIL 3.7 03/20/2025    BASOPHIL 0.8 03/20/2025     Sodium   Date Value Ref Range Status   03/20/2025 139 136 - 145 mmol/L Final     Potassium   Date Value Ref Range Status   03/20/2025 3.9 3.5 - 5.1 mmol/L Final     Chloride   Date Value Ref Range Status   03/20/2025 103 95 - 110 mmol/L Final     CO2   Date Value Ref Range Status   03/20/2025 28 23 - 29 mmol/L Final     Glucose   Date Value Ref Range Status   03/20/2025 115 (H) 70 - 110 mg/dL Final     BUN   Date Value Ref Range Status   03/20/2025 14 8 - 23 mg/dL Final     Creatinine   Date Value Ref Range Status   03/20/2025 0.9 0.5 - 1.4 mg/dL Final     Calcium   Date Value Ref Range Status   03/20/2025 9.8 8.7 - 10.5 mg/dL Final     Total Protein   Date Value Ref Range Status   03/20/2025 8.0 6.0 - 8.4 g/dL Final     Albumin   Date Value Ref Range Status   03/20/2025 4.2 3.5 - 5.2 g/dL Final     Total Bilirubin   Date Value Ref Range Status   03/20/2025 0.2 0.1 - 1.0 mg/dL Final     Comment:     For infants and newborns, interpretation of results should be based  on gestational age, weight and in agreement with clinical  observations.    Premature Infant recommended reference ranges:  Up to 24 hours.............<8.0 mg/dL  Up to 48 hours............<12.0 mg/dL  3-5 days..................<15.0 mg/dL  6-29 days.................<15.0 mg/dL       Alkaline Phosphatase   Date Value Ref Range Status   03/20/2025 106 40 - 150 U/L Final     AST   Date Value Ref Range Status   03/20/2025 18 10 - 40 U/L Final     ALT   Date Value Ref Range Status   03/20/2025 10 10 - 44 U/L Final     Anion Gap   Date Value Ref Range Status   03/20/2025 8 8 - 16 mmol/L Final     eGFR if    Date Value Ref Range Status    08/19/2021 >60 >60 mL/min/1.73 m^2 Final     eGFR if non    Date Value Ref Range Status   08/19/2021 >60 >60 mL/min/1.73 m^2 Final     Comment:     Calculation used to obtain the estimated glomerular filtration  rate (eGFR) is the CKD-EPI equation.        Lab Results   Component Value Date    HEPBSAG Non-Reactive 04/24/2025    HEPBSAB <3.00 03/30/2023    HEPBSAB Non-reactive 03/30/2023    HEPBCAB Non-Reactive 04/24/2025           MS Impression and Plan:        1.) Multiple Sclerosis--continue Kesimpta; next labs are due in September and can be ordered at the next visit   2.) Impaired mobility and ADLs  3.) Risk for falls  Deb has advanced disability from MS. Since her 's death, her sister is her primary caregiver. They request a hospital bed at this time. This patient requires an electric (her sister might have trouble cranking the bed up and down), variable height hospital bed because their condition requires positioning of the body in ways not feasible with an ordinary bed to alleviate pain and neuropathic symptoms; because they require a bed height different than a fixed height hospital bed to permit safe transfers to a chair, wheelchair or standing position; and because they require frequent changes in body position.  Body positioning cannot be adequately resolved by the use of pillows and wedges. A hospital bed will also allow for safer transfers, fall prevention, and wound prevention.     *see HPI for specific examples regarding Deb's individual need for a hospital bed.     Order placed for hospital bed and slide board.   I will reach out to her physical therapist to discuss transfer belt.     She will follow up with me in September as scheduled for routine visit.   The visit today is associated with current or anticipated ongoing medical care related to this patient's single serious condition/complex condition of multiple sclerosis.          JANES Jiang, CNS    Problem  List Items Addressed This Visit       High risk medication use     Other Visit Diagnoses         Multiple sclerosis    -  Primary    Relevant Orders    HOSPITAL BED FOR HOME USE    SLIDING BOARD FOR HOME USE      Impaired mobility and ADLs        Relevant Orders    HOSPITAL BED FOR HOME USE    SLIDING BOARD FOR HOME USE      Counseling regarding goals of care          Prophylactic immunotherapy          Risk for falls                           [1]   Social History  Tobacco Use    Smoking status: Every Day     Types: Cigarettes    Smokeless tobacco: Never   Substance Use Topics    Alcohol use: No    Drug use: No

## 2025-07-18 ENCOUNTER — OFFICE VISIT (OUTPATIENT)
Dept: NEUROLOGY | Facility: CLINIC | Age: 65
End: 2025-07-18
Payer: MEDICARE

## 2025-07-18 DIAGNOSIS — Z29.89 PROPHYLACTIC IMMUNOTHERAPY: ICD-10-CM

## 2025-07-18 DIAGNOSIS — Z74.09 IMPAIRED MOBILITY AND ADLS: ICD-10-CM

## 2025-07-18 DIAGNOSIS — Z71.89 COUNSELING REGARDING GOALS OF CARE: ICD-10-CM

## 2025-07-18 DIAGNOSIS — Z79.899 HIGH RISK MEDICATION USE: ICD-10-CM

## 2025-07-18 DIAGNOSIS — G35 MULTIPLE SCLEROSIS: Primary | ICD-10-CM

## 2025-07-18 DIAGNOSIS — Z91.81 RISK FOR FALLS: ICD-10-CM

## 2025-07-18 DIAGNOSIS — Z78.9 IMPAIRED MOBILITY AND ADLS: ICD-10-CM

## 2025-07-18 PROCEDURE — G2211 COMPLEX E/M VISIT ADD ON: HCPCS | Mod: 95,,, | Performed by: CLINICAL NURSE SPECIALIST

## 2025-07-18 PROCEDURE — 98006 SYNCH AUDIO-VIDEO EST MOD 30: CPT | Mod: 95,,, | Performed by: CLINICAL NURSE SPECIALIST

## 2025-07-18 NOTE — Clinical Note
Rojelio,  I saw Deb and documented need for a hospital bed. If DV or KB want to review documentation before I sign, that would be great. I've also ordered a sliding board for her. Can you help to secure these items for her?

## 2025-07-21 ENCOUNTER — TELEPHONE (OUTPATIENT)
Dept: PSYCHIATRY | Facility: CLINIC | Age: 65
End: 2025-07-21
Payer: MEDICARE

## 2025-07-21 NOTE — TELEPHONE ENCOUNTER
I spoke with Pt sister to confirm a vendor for the DME that has been ordered. Pt confirmed that [employer] was the vendor they wanted. Pt also said that Pt should have [employer] as a secondary insurance coverage. The order for the Hospital Bed and Sliding Board were faxed successfully.

## 2025-07-30 ENCOUNTER — PATIENT MESSAGE (OUTPATIENT)
Dept: PSYCHIATRY | Facility: CLINIC | Age: 65
End: 2025-07-30
Payer: MEDICARE

## 2025-08-01 ENCOUNTER — PATIENT MESSAGE (OUTPATIENT)
Dept: PSYCHIATRY | Facility: CLINIC | Age: 65
End: 2025-08-01
Payer: MEDICARE

## 2025-08-06 ENCOUNTER — EXTERNAL HOME HEALTH (OUTPATIENT)
Dept: HOME HEALTH SERVICES | Facility: HOSPITAL | Age: 65
End: 2025-08-06
Payer: MEDICARE

## 2025-08-19 ENCOUNTER — TELEPHONE (OUTPATIENT)
Dept: NEUROLOGY | Facility: CLINIC | Age: 65
End: 2025-08-19
Payer: MEDICARE

## 2025-08-19 DIAGNOSIS — D84.9 IMMUNOSUPPRESSION: ICD-10-CM

## 2025-08-19 DIAGNOSIS — G35 MULTIPLE SCLEROSIS: ICD-10-CM

## 2025-08-20 RX ORDER — OFATUMUMAB 20 MG/.4ML
INJECTION, SOLUTION SUBCUTANEOUS
Qty: 0.4 ML | Refills: 1 | Status: SHIPPED | OUTPATIENT
Start: 2025-08-20

## 2025-09-02 ENCOUNTER — DOCUMENT SCAN (OUTPATIENT)
Dept: HOME HEALTH SERVICES | Facility: HOSPITAL | Age: 65
End: 2025-09-02
Payer: MEDICARE